# Patient Record
Sex: MALE | Race: WHITE | NOT HISPANIC OR LATINO | Employment: OTHER | URBAN - NONMETROPOLITAN AREA
[De-identification: names, ages, dates, MRNs, and addresses within clinical notes are randomized per-mention and may not be internally consistent; named-entity substitution may affect disease eponyms.]

---

## 2017-01-06 ENCOUNTER — OFFICE VISIT (OUTPATIENT)
Dept: OTOLARYNGOLOGY | Facility: CLINIC | Age: 72
End: 2017-01-06

## 2017-01-06 VITALS
TEMPERATURE: 97.9 F | HEART RATE: 71 BPM | HEIGHT: 72 IN | DIASTOLIC BLOOD PRESSURE: 97 MMHG | WEIGHT: 211.25 LBS | SYSTOLIC BLOOD PRESSURE: 146 MMHG | BODY MASS INDEX: 28.61 KG/M2

## 2017-01-06 DIAGNOSIS — H74.92 MASTOID DISORDER, LEFT: ICD-10-CM

## 2017-01-06 DIAGNOSIS — J30.0 VASOMOTOR RHINITIS: ICD-10-CM

## 2017-01-06 DIAGNOSIS — H91.93 HEARING LOSS, BILATERAL: ICD-10-CM

## 2017-01-06 DIAGNOSIS — H69.83 ETD (EUSTACHIAN TUBE DYSFUNCTION), BILATERAL: Primary | ICD-10-CM

## 2017-01-06 DIAGNOSIS — H70.12 MASTOIDITIS, CHRONIC, LEFT: ICD-10-CM

## 2017-01-06 PROCEDURE — 69220 CLEAN OUT MASTOID CAVITY: CPT | Performed by: NURSE PRACTITIONER

## 2017-01-06 PROCEDURE — 99213 OFFICE O/P EST LOW 20 MIN: CPT | Performed by: NURSE PRACTITIONER

## 2017-01-06 RX ORDER — LOVASTATIN 10 MG/1
10 TABLET ORAL NIGHTLY
COMMUNITY
End: 2019-10-30 | Stop reason: DRUGHIGH

## 2017-01-06 RX ORDER — ASPIRIN 81 MG/1
81 TABLET, CHEWABLE ORAL DAILY
COMMUNITY

## 2017-01-06 RX ORDER — LISINOPRIL 2.5 MG/1
2.5 TABLET ORAL DAILY
COMMUNITY

## 2017-01-06 RX ORDER — IPRATROPIUM BROMIDE 42 UG/1
2 SPRAY, METERED NASAL 3 TIMES DAILY PRN
Qty: 15 ML | Refills: 11 | Status: SHIPPED | OUTPATIENT
Start: 2017-01-06

## 2017-01-06 RX ORDER — CLOPIDOGREL BISULFATE 75 MG/1
75 TABLET ORAL DAILY
COMMUNITY

## 2017-01-06 NOTE — PROGRESS NOTES
YOB: 1945  Location: [facility]  Location Address: [address] [place] [address], [facility], [address] [place] [address]  Location Phone: [phone]    Chief Complaint   Patient presents with   • Ear Problem       History of Present Illness  Prashanth Cantu is a 71 y.o. male.  Prashanth Cantu is here for follow up of ENT complaints. The patient has had problems with cerumen accumulation and decreased hearing  The symptoms are not localized to a particular location. The patient has had moderate symptoms. The symptoms have been present for the last several years . The symptoms are aggravated by  no identifiable factors . The symptoms are improved by surgery, myringotomy tube insertion.  Hx of left mastoidetomy Dr. Acevedo - doing well and may be released from him.  C/o runny nose constantly.     Past Medical History   Diagnosis Date   • Cholesteatoma    • Chronic otitis externa    • Hearing loss    • Heart disease    • Hypertension    • Impacted cerumen    • Mastoiditis    • Tympanic membrane perforation        Past Surgical History   Procedure Laterality Date   • Coronary angioplasty with stent placement     • Shoulder surgery     • Tympanomastoidectomy           Current Outpatient Prescriptions:   •  aspirin 81 MG chewable tablet, Chew 81 mg Daily., Disp: , Rfl:   •  clopidogrel (PLAVIX) 75 MG tablet, Take 75 mg by mouth Daily., Disp: , Rfl:   •  fluticasone (VERAMYST) 27.5 MCG/SPRAY nasal spray, 2 sprays into each nostril Daily., Disp: , Rfl:   •  fluticasone-salmeterol (ADVAIR) 100-50 MCG/DOSE DISKUS, Inhale 2 (Two) Times a Day., Disp: , Rfl:   •  lisinopril (PRINIVIL,ZESTRIL) 2.5 MG tablet, Take 2.5 mg by mouth Daily., Disp: , Rfl:   •  lovastatin (MEVACOR) 10 MG tablet, Take 10 mg by mouth Every Night., Disp: , Rfl:   •  ipratropium (ATROVENT) 0.06 % nasal spray, 2 sprays into each nostril 3 (Three) Times a Day As Needed for rhinitis (FOR RUNNY NOSE)., Disp: 15 mL, Rfl: 11    Review of patient's allergies indicates no  known allergies.    Family History   Problem Relation Age of Onset   • Cancer Mother        Social History     Social History   • Marital status:      Spouse name: N/A   • Number of children: N/A   • Years of education: N/A     Occupational History   • Not on file.     Social History Main Topics   • Smoking status: Current Every Day Smoker     Packs/day: 1.00   • Smokeless tobacco: Not on file   • Alcohol use No   • Drug use: Defer   • Sexual activity: Not on file     Other Topics Concern   • Not on file     Social History Narrative   • No narrative on file       Review of Systems   Constitutional: Negative.    HENT:        SEE HPI   Eyes: Negative.    Respiratory: Negative.    Cardiovascular: Negative.    Gastrointestinal: Negative.    Endocrine: Negative.    Genitourinary: Negative.    Musculoskeletal: Negative.    Skin: Negative.    Allergic/Immunologic: Negative.    Neurological: Negative.    Hematological: Negative.    Psychiatric/Behavioral: Negative.        Vitals:    01/06/17 1015   BP: 146/97   Pulse: 71   Temp: 97.9 °F (36.6 °C)       Objective     Physical Exam  CONSTITUTIONAL: well nourished, alert, oriented, in no acute distress     COMMUNICATION AND VOICE: able to communicate normally, normal voice quality    HEAD: normocephalic, no lesions, atraumatic, no tenderness, no masses     FACE: appearance normal, no lesions, no tenderness, no deformities, facial motion symmetric    SALIVARY GLANDS: parotid glands with no tenderness, no swelling, no masses, submandibular glands with normal size, nontender    EYES: ocular motility normal, eyelids normal, orbits normal, no proptosis, conjunctiva normal , pupils equal, round     EARS:  Hearing: response to conversational voice normal bilaterally   External Ears: auricles without lesions  Otoscopic: right Tm intact and patent PET, left TM post surgical changes, left EAC/mastoid bowl with squam debris debrided with suction    NOSE:  External Nose: structure  normal, no tenderness on palpation, no nasal discharge, no lesions, no evidence of trauma, nostrils patent   Intranasal Exam: nasal mucosa normal, vestibule within normal limits, inferior turbinate normal, nasal septum midline       ORAL:  Lips: upper and lower lips without lesion   Teeth: dentition within normal limits for age   Gums: gingivae healthy   Oral Mucosa: oral mucosa normal, no mucosal lesions   Floor of Mouth: Warthin’s duct patent, mucosa normal  Tongue: lingual mucosa normal without lesions, normal tongue mobility   Palate: soft and hard palates with normal mucosa and structure  Oropharynx: oropharyngeal mucosa normal    HYPOPHARYNX:   LARYNX: deferred    NECK: neck appearance normal, no mass,  noted without erythema or tenderness    THYROID: no overt thyromegaly, no tenderness, nodules or mass present on palpation, position midline     LYMPH NODES: no lymphadenopathy    CHEST/RESPIRATORY: respiratory effort normal, normal breath sounds     CARDIOVASCULAR: rate and rhythm normal, extremities without cyanosis or edema      NEUROLOGIC/PSYCHIATRIC: oriented to time, place and person, mood normal, affect appropriate, CN II-XII intact grossly    Assessment/Plan   Bill was seen today for ear problem.    Diagnoses and all orders for this visit:    ETD (eustachian tube dysfunction), bilateral    Hearing loss, bilateral    Mastoiditis, chronic, left    Mastoid disorder, left    Vasomotor rhinitis    Other orders  -     ipratropium (ATROVENT) 0.06 % nasal spray; 2 sprays into each nostril 3 (Three) Times a Day As Needed for rhinitis (FOR RUNNY NOSE).      * Surgery not found *  No orders of the defined types were placed in this encounter.    Return in about 4 months (around 5/6/2017).       Patient Instructions   Dry ear precautions  Left mastoid debrided.    Call for problems or worsening symptoms

## 2017-01-06 NOTE — MR AVS SNAPSHOT
Prashanth Cantu   1/6/2017 10:00 AM   Office Visit    Dept Phone:  [phone]   Encounter #:  [id number]    Provider:  SANGEETA Madden   Department:  [facility]                Your Full Care Plan              Today's Medication Changes          These changes are accurate as of: 1/6/17 10:47 AM.  If you have any questions, ask your nurse or doctor.               New Medication(s)Ordered:     ipratropium 0.06 % nasal spray   Commonly known as:  ATROVENT   2 sprays into each nostril 3 (Three) Times a Day As Needed for rhinitis (FOR RUNNY NOSE).   Started by:  SANGEETA Madden            Where to Get Your Medications      These medications were sent to [facility] [address] [place] [address] - [address] - [phone] [facility] [phone]   [address] [facility] [address]     Phone:  [phone]     ipratropium 0.06 % nasal spray                  Your Updated Medication List          This list is accurate as of: 1/6/17 10:47 AM.  Always use your most recent med list.                aspirin 81 MG chewable tablet       clopidogrel 75 MG tablet   Commonly known as:  PLAVIX       fluticasone 27.5 MCG/SPRAY nasal spray   Commonly known as:  VERAMYST       fluticasone-salmeterol 100-50 MCG/DOSE DISKUS   Commonly known as:  ADVAIR       ipratropium 0.06 % nasal spray   Commonly known as:  ATROVENT   2 sprays into each nostril 3 (Three) Times a Day As Needed for rhinitis (FOR RUNNY NOSE).       lisinopril 2.5 MG tablet   Commonly known as:  PRINIVIL,ZESTRIL       lovastatin 10 MG tablet   Commonly known as:  MEVACOR               You Were Diagnosed With        Codes Comments    ETD (eustachian tube dysfunction), bilateral    -  Primary ICD-10-CM: H69.83  ICD-9-CM: 381.81       Instructions     None    Patient Instructions History      Upcoming Appointments     Visit Type Date Time Department    FOLLOW UP 1/6/2017 10:00 AM [facility] [place]    FOLLOW UP 5/8/2017  9:45 AM  "[facility] [place]      [facility] Signup     Our records indicate that you have declined [facility] signup. If you would like to sign up for [facility], please email [facility] [email or web address] or call [phone] to obtain an activation code.             Other Info from Your Visit           Your Appointments     May 08, 2017  9:45 AM CDT   Follow Up with SANGEETA Madden   [facility] (--)    [address] [place] [address]   [address]  [facility] [address] [phone]           Arrive 15 minutes prior to appointment.              Allergies     No Known Allergies      Reason for Visit     Ear Problem           Vital Signs     Blood Pressure Pulse Temperature Height Weight Body Mass Index    146/97 71 97.9 °F (36.6 °C) 72\" (182.9 cm) 211 lb 4 oz (95.8 kg) 28.65 kg/m2    Smoking Status                   Current Every Day Smoker           Problems and Diagnoses Noted     Eustachian tube dysfunction    -  Primary        "

## 2017-03-13 ENCOUNTER — OFFICE VISIT (OUTPATIENT)
Dept: OTOLARYNGOLOGY | Facility: CLINIC | Age: 72
End: 2017-03-13

## 2017-03-13 VITALS
SYSTOLIC BLOOD PRESSURE: 137 MMHG | TEMPERATURE: 97.8 F | DIASTOLIC BLOOD PRESSURE: 88 MMHG | HEIGHT: 72 IN | WEIGHT: 214.5 LBS | BODY MASS INDEX: 29.05 KG/M2 | HEART RATE: 68 BPM

## 2017-03-13 DIAGNOSIS — H91.93 HEARING LOSS, BILATERAL: ICD-10-CM

## 2017-03-13 DIAGNOSIS — H69.83 ETD (EUSTACHIAN TUBE DYSFUNCTION), BILATERAL: ICD-10-CM

## 2017-03-13 DIAGNOSIS — H74.92 MASTOID DISORDER, LEFT: Primary | ICD-10-CM

## 2017-03-13 DIAGNOSIS — H70.12 MASTOIDITIS, CHRONIC, LEFT: ICD-10-CM

## 2017-03-13 PROCEDURE — 99214 OFFICE O/P EST MOD 30 MIN: CPT | Performed by: NURSE PRACTITIONER

## 2017-03-13 PROCEDURE — 69220 CLEAN OUT MASTOID CAVITY: CPT | Performed by: NURSE PRACTITIONER

## 2017-03-13 NOTE — PROGRESS NOTES
Procedure Note    Preprocedure Diagnosis:  Cerumen Impaction    Postprocedure Diagnosis:  Cerumen Impaction      PROCEDURE NOTE    PROCEDURE: Cleaning of mastoid bowl    ANESTHESIA:None     REASON FOR THE PROCEDURE:The patient presented with cerumen impaction .   The patient verbally consented to intervention after a full discussion of risks, benefits, and alternatives. No guarantees were made or implied.    DETAILS OF THE PROCEDURE:The patient was seated upright in the exam room chair. The open head otoscope was used to visualize the ear canal and tympanic membrane. Squamoceruminous debris was removed from left mastoid bowl

## 2017-03-13 NOTE — PROGRESS NOTES
YOB: 1945  Location: [facility]  Location Address: [address] [place] [address], [facility], [address] [place] [address]  Location Phone: [phone]    Chief Complaint   Patient presents with   • Cerumen Impaction       History of Present Illness  Prashanth Cantu is a 71 y.o. male.  Prashanth Cantu is here for follow up of ENT complaints. The patient has had problems with cerumen accumulation and decreased hearing  The symptoms are not localized to a particular location. The patient has had moderate symptoms. The symptoms have been present for the last several years . The symptoms are aggravated by  no identifiable factors . The symptoms are improved by surgery, myringotomy tube insertion.  Hx of left mastoidetomy Dr. Acevedo. Reports his left ear runs a lot.  He has gtts at home for left ear. Has an upcoming appt with Dr. Acevedo    Past Medical History   Diagnosis Date   • Cholesteatoma    • Chronic otitis externa    • Hearing loss    • Heart disease    • Hypertension    • Impacted cerumen    • Mastoiditis    • Polyp of right middle ear      granulation polyp of righttympanic membrane   • Tympanic membrane perforation        Past Surgical History   Procedure Laterality Date   • Coronary angioplasty with stent placement     • Shoulder surgery     • Tympanomastoidectomy  2015     left         Current Outpatient Prescriptions:   •  aspirin 81 MG chewable tablet, Chew 81 mg Daily., Disp: , Rfl:   •  clopidogrel (PLAVIX) 75 MG tablet, Take 75 mg by mouth Daily., Disp: , Rfl:   •  fluticasone (VERAMYST) 27.5 MCG/SPRAY nasal spray, 2 sprays into each nostril Daily., Disp: , Rfl:   •  fluticasone-salmeterol (ADVAIR) 100-50 MCG/DOSE DISKUS, Inhale 2 (Two) Times a Day., Disp: , Rfl:   •  ipratropium (ATROVENT) 0.06 % nasal spray, 2 sprays into each nostril 3 (Three) Times a Day As Needed for rhinitis (FOR RUNNY NOSE)., Disp: 15 mL, Rfl: 11  •  lisinopril (PRINIVIL,ZESTRIL) 2.5 MG tablet, Take 2.5 mg by mouth Daily., Disp: , Rfl:    •  lovastatin (MEVACOR) 10 MG tablet, Take 10 mg by mouth Every Night., Disp: , Rfl:     Review of patient's allergies indicates no known allergies.    Family History   Problem Relation Age of Onset   • Cancer Mother        Social History     Social History   • Marital status:      Spouse name: N/A   • Number of children: N/A   • Years of education: N/A     Occupational History   • Not on file.     Social History Main Topics   • Smoking status: Current Every Day Smoker     Packs/day: 1.00   • Smokeless tobacco: Not on file   • Alcohol use No   • Drug use: Defer   • Sexual activity: Not on file     Other Topics Concern   • Not on file     Social History Narrative       Review of Systems   Constitutional: Negative.    HENT:        SEE HPI   Eyes: Negative.    Respiratory: Negative.    Cardiovascular: Negative.    Gastrointestinal: Negative.    Endocrine: Negative.    Genitourinary: Negative.    Musculoskeletal: Negative.    Skin: Negative.    Allergic/Immunologic: Negative.    Neurological: Negative.    Hematological: Negative.    Psychiatric/Behavioral: Negative.        Vitals:    03/13/17 1337   BP: 137/88   Pulse: 68   Temp: 97.8 °F (36.6 °C)       Objective     Physical Exam  CONSTITUTIONAL: well nourished, alert, oriented, in no acute distress     COMMUNICATION AND VOICE: able to communicate normally, normal voice quality    HEAD: normocephalic, no lesions, atraumatic, no tenderness, no masses     FACE: appearance normal, no lesions, no tenderness, no deformities, facial motion symmetric    SALIVARY GLANDS: parotid glands with no tenderness, no swelling, no masses, submandibular glands with normal size, nontender    EYES: ocular motility normal, eyelids normal, orbits normal, no proptosis, conjunctiva normal , pupils equal, round     EARS:  Hearing: response to conversational voice decreased  External Ears: auricles without lesions  Otoscopic: right Tm intact with obstructed possibly displaced tube, left  TM post surgical changes, left EAC/mastoid bowl with squamoceruminous debris removed/debrided with suction. lurdes Vicente.  Type B small volume tympanogram    NOSE:  External Nose: structure normal, no tenderness on palpation, no nasal discharge, no lesions, no evidence of trauma, nostrils patent     ORAL:  Lips: upper and lower lips without lesion       NECK: neck appearance normal, no mass,  noted without erythema or tenderness    THYROID: no overt thyromegaly, no tenderness, nodules or mass present on palpation, position midline     LYMPH NODES: no lymphadenopathy    CHEST/RESPIRATORY: respiratory effort normal, normal breath sounds     CARDIOVASCULAR: rate and rhythm normal, extremities without cyanosis or edema      NEUROLOGIC/PSYCHIATRIC: oriented to time, place and person, mood normal, affect appropriate, CN II-XII intact grossly    Assessment/Plan   Bill was seen today for cerumen impaction.    Diagnoses and all orders for this visit:    Mastoid disorder, left    Hearing loss, bilateral    ETD (eustachian tube dysfunction), bilateral    Mastoiditis, chronic, left    * Surgery not found *  No orders of the defined types were placed in this encounter.    Return in about 3 months (around 6/13/2017).       Patient Instructions   Return to Dr Acevedo to possibly replace right tube.      Dry ear precautions

## 2017-05-17 ENCOUNTER — TRANSCRIBE ORDERS (OUTPATIENT)
Dept: ADMINISTRATIVE | Facility: HOSPITAL | Age: 72
End: 2017-05-17

## 2017-05-17 DIAGNOSIS — H90.3 SENSORY HEARING LOSS, BILATERAL: Primary | ICD-10-CM

## 2017-05-25 ENCOUNTER — HOSPITAL ENCOUNTER (OUTPATIENT)
Dept: MRI IMAGING | Facility: HOSPITAL | Age: 72
Discharge: HOME | End: 2017-05-25
Admitting: OTOLARYNGOLOGY

## 2017-05-25 ENCOUNTER — HOSPITAL ENCOUNTER (OUTPATIENT)
Dept: MRI IMAGING | Facility: HOSPITAL | Age: 72
Discharge: HOME | End: 2017-05-25

## 2017-05-25 DIAGNOSIS — H90.3 SENSORY HEARING LOSS, BILATERAL: ICD-10-CM

## 2017-05-25 LAB — CREAT BLDA-MCNC: 1.1 MG/DL (ref 0.6–1.3)

## 2017-05-25 PROCEDURE — 0 GADOBENATE DIMEGLUMINE 529 MG/ML SOLUTION: Performed by: OTOLARYNGOLOGY

## 2017-05-25 PROCEDURE — 70553 MRI BRAIN STEM W/O & W/DYE: CPT

## 2017-05-25 PROCEDURE — A9577 INJ MULTIHANCE: HCPCS | Performed by: OTOLARYNGOLOGY

## 2017-05-25 PROCEDURE — 82565 ASSAY OF CREATININE: CPT

## 2017-05-25 RX ADMIN — GADOBENATE DIMEGLUMINE 20 ML: 529 INJECTION, SOLUTION INTRAVENOUS at 16:30

## 2017-05-26 RX ORDER — AZELASTINE 1 MG/ML
2 SPRAY, METERED NASAL 2 TIMES DAILY
Qty: 30 ML | Refills: 6 | Status: SHIPPED | OUTPATIENT
Start: 2017-05-26 | End: 2018-06-27 | Stop reason: SDUPTHER

## 2017-05-31 ENCOUNTER — TELEPHONE (OUTPATIENT)
Dept: OTOLARYNGOLOGY | Facility: CLINIC | Age: 72
End: 2017-05-31

## 2017-05-31 RX ORDER — FLUTICASONE PROPIONATE 50 MCG
2 SPRAY, SUSPENSION (ML) NASAL DAILY
Qty: 16 G | Refills: 6 | Status: SHIPPED | OUTPATIENT
Start: 2017-05-31 | End: 2018-07-16 | Stop reason: SDUPTHER

## 2017-07-11 ENCOUNTER — OFFICE VISIT (OUTPATIENT)
Dept: OTOLARYNGOLOGY | Facility: CLINIC | Age: 72
End: 2017-07-11

## 2017-07-11 VITALS
DIASTOLIC BLOOD PRESSURE: 78 MMHG | HEART RATE: 80 BPM | TEMPERATURE: 98 F | SYSTOLIC BLOOD PRESSURE: 135 MMHG | RESPIRATION RATE: 20 BRPM | BODY MASS INDEX: 27.09 KG/M2 | WEIGHT: 200 LBS | HEIGHT: 72 IN

## 2017-07-11 DIAGNOSIS — H69.83 ETD (EUSTACHIAN TUBE DYSFUNCTION), BILATERAL: ICD-10-CM

## 2017-07-11 DIAGNOSIS — H92.11 OTORRHEA OF RIGHT EAR: ICD-10-CM

## 2017-07-11 DIAGNOSIS — H70.12 MASTOIDITIS, CHRONIC, LEFT: Primary | ICD-10-CM

## 2017-07-11 DIAGNOSIS — H91.93 HEARING LOSS, BILATERAL: ICD-10-CM

## 2017-07-11 DIAGNOSIS — Z96.22 STATUS POST MYRINGOTOMY WITH INSERTION OF TUBE: ICD-10-CM

## 2017-07-11 PROCEDURE — 99214 OFFICE O/P EST MOD 30 MIN: CPT | Performed by: NURSE PRACTITIONER

## 2017-07-11 PROCEDURE — 69220 CLEAN OUT MASTOID CAVITY: CPT | Performed by: NURSE PRACTITIONER

## 2017-07-11 RX ORDER — CIPROFLOXACIN AND DEXAMETHASONE 3; 1 MG/ML; MG/ML
4 SUSPENSION/ DROPS AURICULAR (OTIC) 2 TIMES DAILY
Qty: 7.5 ML | Refills: 0 | Status: SHIPPED | OUTPATIENT
Start: 2017-07-11 | End: 2017-07-18

## 2017-07-17 NOTE — PROGRESS NOTES
PRIMARY CARE PROVIDER: Tony Spence MD  REFERRING PROVIDER: No ref. provider found    Chief Complaint   Patient presents with   • Ear Problem     BLEEDING FROM RIGHT EAR       Subjective   History of Present Illness:  Prashanth Cantu is a  72 y.o. male who complains of bloody otorrhea and muffled hearing. The symptoms are localized to the right ear. The patient has had mild symptoms. The symptoms have been present for the last 2 weeks . There have been no identified factors that aggravate the symptoms . There have been no factors that have improved the symptoms. He states he had a right PE tube placed by Dr. Acevedo approximately 3-5 months ago.  He also has a history of a left mastoidectomy by Dr. Acevedo.    Review of Systems:  Review of Systems   Constitutional: Negative for chills and fever.   HENT: Positive for ear discharge and hearing loss. Negative for congestion, ear pain, postnasal drip, sinus pressure, sore throat and voice change.    All other systems reviewed and are negative.      Past History:  Past Medical History:   Diagnosis Date   • Cholesteatoma    • Chronic mastoiditis    • Chronic otitis externa    • Eustachian tube dysfunction    • Hearing loss    • Heart disease    • Hypertension    • Impacted cerumen    • Mastoid disorder    • Mastoiditis    • Polyp of right middle ear     granulation polyp of righttympanic membrane   • Tympanic membrane perforation      Past Surgical History:   Procedure Laterality Date   • CORONARY ANGIOPLASTY WITH STENT PLACEMENT     • SHOULDER SURGERY     • TYMPANOMASTOIDECTOMY  02/2015    left     Family History   Problem Relation Age of Onset   • Cancer Mother      Social History   Substance Use Topics   • Smoking status: Current Every Day Smoker     Packs/day: 1.00   • Smokeless tobacco: None   • Alcohol use No     Allergies:  Review of patient's allergies indicates no known allergies.    Current Outpatient Prescriptions:   •  aspirin 81 MG chewable tablet, Chew 81  "mg Daily., Disp: , Rfl:   •  clopidogrel (PLAVIX) 75 MG tablet, Take 75 mg by mouth Daily., Disp: , Rfl:   •  fluticasone-salmeterol (ADVAIR) 100-50 MCG/DOSE DISKUS, Inhale 2 (Two) Times a Day., Disp: , Rfl:   •  ipratropium (ATROVENT) 0.06 % nasal spray, 2 sprays into each nostril 3 (Three) Times a Day As Needed for rhinitis (FOR RUNNY NOSE)., Disp: 15 mL, Rfl: 11  •  lisinopril (PRINIVIL,ZESTRIL) 2.5 MG tablet, Take 2.5 mg by mouth Daily., Disp: , Rfl:   •  lovastatin (MEVACOR) 10 MG tablet, Take 10 mg by mouth Every Night., Disp: , Rfl:   •  ciprofloxacin-dexamethasone (CIPRODEX) 0.3-0.1 % otic suspension, Administer 4 drops to the right ear 2 (Two) Times a Day for 7 days., Disp: 7.5 mL, Rfl: 0      Objective     Vital Signs:    /78  Pulse 80  Temp 98 °F (36.7 °C)  Resp 20  Ht 72\" (182.9 cm)  Wt 200 lb (90.7 kg)  BMI 27.12 kg/m2    Physical Exam:  Physical Exam  CONSTITUTIONAL: well nourished, well-developed, alert, oriented, in no acute distress   COMMUNICATION AND VOICE: able to communicate normally, normal voice quality  HEAD: normocephalic, no lesions, atraumatic, no tenderness, no masses   FACE: appearance normal, no lesions, no tenderness, no deformities, facial motion symmetric  SALIVARY GLANDS: parotid glands with no tenderness, no swelling, no masses, submandibular glands with normal size, nontender  EYES: ocular motility normal, eyelids normal, orbits normal, no proptosis, conjunctiva normal , pupils equal, round   EARS:  Hearing: response to conversational voice normal bilaterally   External Ears: auricles without lesions  Otoscopic: Right tympanic membrane intact with obstructed PE tube-this is likely displaced, left tympanic membrane with post-surgical changes, left EAC/mastoid bowl with squamoceruminous debris partially removed/debridement with suction.  Patient was unable to tolerate complete debridement due to severe dizziness.  NOSE:  External Nose: structure normal, no tenderness on " palpation, no nasal discharge, no lesions, no evidence of trauma, nostrils patent   ORAL:  Lips: upper and lower lips without lesion    NECK: neck appearance normal, no masses or tenderness  THYROID: no overt thyromegaly, no tenderness, nodules or mass present on palpation, position midline   LYMPH NODES: no lymphadenopathy  CHEST/RESPIRATORY: respiratory effort normal, normal breath sounds   CARDIOVASCULAR: rate and rhythm normal, extremities without cyanosis or edema    NEUROLOGIC/PSYCHIATRIC: oriented to time, place and person, mood normal, affect appropriate, CN II-XII intact grossly      Assessment   Assessment:  1. Mastoiditis, chronic, left    2. Hearing loss, bilateral    3. ETD (eustachian tube dysfunction), bilateral    4. Status post myringotomy with insertion of tube    5. Otorrhea of right ear        Plan   Plan:    New Medications Ordered This Visit   Medications   • ciprofloxacin-dexamethasone (CIPRODEX) 0.3-0.1 % otic suspension     Sig: Administer 4 drops to the right ear 2 (Two) Times a Day for 7 days.     Dispense:  7.5 mL     Refill:  0     Starts to the Ciprodex drops to right ear.  Patient was instructed to get an appointment with Dr. Acevedo as he will likely need this PE tube replaced.  Left EAC/mastoid bowl was partially debrided.  See procedure note. Dry ear precautions. Call for ear drainage, ear pain, fever over 101, or hearing loss. Call for problems or worsening symptoms.  Follow-up in 4 weeks.     My findings and recommendations were discussed and questions were answered.     Val Diehl, APRN

## 2017-07-17 NOTE — PROGRESS NOTES
Procedure   Procedures   Procedure Note    Preprocedure Diagnosis:  Cerumen Impaction    Postprocedure Diagnosis:  Cerumen Impaction      PROCEDURE NOTE    PROCEDURE: Cleaning of left mastoid bowl    ANESTHESIA: None     REASON FOR THE PROCEDURE:The patient presented with cerumen impaction .   The patient verbally consented to intervention after a full discussion of risks, benefits, and alternatives. No guarantees were made or implied.    DETAILS OF THE PROCEDURE:The patient was seated upright in the exam room chair. The open head otoscope was used to visualize the ear canal and tympanic membrane. Squamoceruminous debris was partially removed from left mastoid bowl using suction.  Patient was unable to tolerate complete removal due to dizziness.

## 2017-10-31 RX ORDER — IBUPROFEN 800 MG/1
800 TABLET ORAL 2 TIMES DAILY PRN
Qty: 120 TABLET | Refills: 1 | Status: SHIPPED | OUTPATIENT
Start: 2017-10-31

## 2017-11-15 DIAGNOSIS — Z12.5 SCREENING FOR PROSTATE CANCER: ICD-10-CM

## 2017-11-15 DIAGNOSIS — E78.00 PURE HYPERCHOLESTEROLEMIA: ICD-10-CM

## 2017-11-15 DIAGNOSIS — I10 ESSENTIAL HYPERTENSION: Primary | ICD-10-CM

## 2017-12-13 DIAGNOSIS — Z12.5 SCREENING FOR PROSTATE CANCER: ICD-10-CM

## 2017-12-13 DIAGNOSIS — I10 ESSENTIAL HYPERTENSION: ICD-10-CM

## 2017-12-13 DIAGNOSIS — E78.00 PURE HYPERCHOLESTEROLEMIA: ICD-10-CM

## 2017-12-13 LAB
ALBUMIN SERPL-MCNC: 4.5 G/DL (ref 3.5–5.2)
ALP BLD-CCNC: 70 U/L (ref 40–130)
ALT SERPL-CCNC: 20 U/L (ref 5–41)
ANION GAP SERPL CALCULATED.3IONS-SCNC: 13 MMOL/L (ref 7–19)
AST SERPL-CCNC: 19 U/L (ref 5–40)
BACTERIA: ABNORMAL /HPF
BILIRUB SERPL-MCNC: 1 MG/DL (ref 0.2–1.2)
BILIRUBIN URINE: ABNORMAL
BLOOD, URINE: NEGATIVE
BUN BLDV-MCNC: 19 MG/DL (ref 8–23)
CALCIUM SERPL-MCNC: 9.6 MG/DL (ref 8.8–10.2)
CHLORIDE BLD-SCNC: 103 MMOL/L (ref 98–111)
CHOLESTEROL, TOTAL: 126 MG/DL (ref 160–199)
CLARITY: ABNORMAL
CO2: 26 MMOL/L (ref 22–29)
COLOR: ABNORMAL
CREAT SERPL-MCNC: 0.9 MG/DL (ref 0.5–1.2)
CRYSTALS, UA: ABNORMAL /HPF
GFR NON-AFRICAN AMERICAN: >60
GLUCOSE BLD-MCNC: 89 MG/DL (ref 74–109)
GLUCOSE URINE: NEGATIVE MG/DL
HCT VFR BLD CALC: 46.9 % (ref 42–52)
HDLC SERPL-MCNC: 38 MG/DL (ref 55–121)
HEMOGLOBIN: 15.5 G/DL (ref 14–18)
KETONES, URINE: NEGATIVE MG/DL
LDL CHOLESTEROL CALCULATED: 58 MG/DL
LEUKOCYTE ESTERASE, URINE: ABNORMAL
MCH RBC QN AUTO: 30.8 PG (ref 27–31)
MCHC RBC AUTO-ENTMCNC: 33 G/DL (ref 33–37)
MCV RBC AUTO: 93.1 FL (ref 80–94)
MUCUS: ABNORMAL /LPF
NITRITE, URINE: NEGATIVE
PDW BLD-RTO: 12.4 % (ref 11.5–14.5)
PH UA: 6
PLATELET # BLD: 207 K/UL (ref 130–400)
PMV BLD AUTO: 10.2 FL (ref 9.4–12.4)
POTASSIUM SERPL-SCNC: 4.4 MMOL/L (ref 3.5–5)
PROSTATE SPECIFIC ANTIGEN: 1.16 NG/ML (ref 0–4)
PROTEIN UA: NEGATIVE MG/DL
RBC # BLD: 5.04 M/UL (ref 4.7–6.1)
SODIUM BLD-SCNC: 142 MMOL/L (ref 136–145)
SPECIFIC GRAVITY UA: 1.02
TOTAL PROTEIN: 7.1 G/DL (ref 6.6–8.7)
TRIGL SERPL-MCNC: 148 MG/DL (ref 0–149)
TSH SERPL DL<=0.05 MIU/L-ACNC: 2.45 UIU/ML (ref 0.27–4.2)
UROBILINOGEN, URINE: 0.2 E.U./DL
WBC # BLD: 6 K/UL (ref 4.8–10.8)
WBC UA: ABNORMAL /HPF (ref 0–5)

## 2017-12-13 RX ORDER — ASPIRIN 81 MG/1
81 TABLET ORAL DAILY
COMMUNITY

## 2017-12-13 RX ORDER — NITROGLYCERIN 0.4 MG/1
0.4 TABLET SUBLINGUAL EVERY 5 MIN PRN
COMMUNITY
End: 2018-06-06 | Stop reason: SDUPTHER

## 2017-12-13 RX ORDER — OXYCODONE AND ACETAMINOPHEN 7.5; 325 MG/1; MG/1
1 TABLET ORAL 2 TIMES DAILY PRN
COMMUNITY
Start: 2017-09-22

## 2017-12-13 RX ORDER — SILDENAFIL 100 MG/1
100 TABLET, FILM COATED ORAL PRN
COMMUNITY
End: 2017-12-20

## 2017-12-13 RX ORDER — LISINOPRIL 2.5 MG/1
2.5 TABLET ORAL DAILY
COMMUNITY
End: 2018-01-08 | Stop reason: SDUPTHER

## 2017-12-13 RX ORDER — CLOPIDOGREL BISULFATE 75 MG/1
75 TABLET ORAL DAILY
COMMUNITY
End: 2018-01-08 | Stop reason: SDUPTHER

## 2017-12-13 RX ORDER — FLUTICASONE PROPIONATE 50 MCG
SPRAY, SUSPENSION (ML) NASAL
COMMUNITY
Start: 2017-10-04 | End: 2021-09-07 | Stop reason: SDUPTHER

## 2017-12-13 RX ORDER — LOVASTATIN 20 MG/1
20 TABLET ORAL NIGHTLY
COMMUNITY
End: 2018-01-08 | Stop reason: SDUPTHER

## 2017-12-15 LAB — URINE CULTURE, ROUTINE: NORMAL

## 2017-12-20 ENCOUNTER — OFFICE VISIT (OUTPATIENT)
Dept: INTERNAL MEDICINE | Age: 72
End: 2017-12-20
Payer: MEDICARE

## 2017-12-20 VITALS
HEART RATE: 82 BPM | BODY MASS INDEX: 28.71 KG/M2 | SYSTOLIC BLOOD PRESSURE: 128 MMHG | DIASTOLIC BLOOD PRESSURE: 82 MMHG | OXYGEN SATURATION: 98 % | WEIGHT: 212 LBS | HEIGHT: 72 IN

## 2017-12-20 DIAGNOSIS — I49.5 SICK SINUS SYNDROME (HCC): ICD-10-CM

## 2017-12-20 DIAGNOSIS — E78.00 PURE HYPERCHOLESTEROLEMIA: ICD-10-CM

## 2017-12-20 DIAGNOSIS — F17.200 CURRENT EVERY DAY SMOKER: Primary | ICD-10-CM

## 2017-12-20 DIAGNOSIS — I25.10 CORONARY ARTERY DISEASE INVOLVING NATIVE CORONARY ARTERY OF NATIVE HEART WITHOUT ANGINA PECTORIS: ICD-10-CM

## 2017-12-20 DIAGNOSIS — I10 ESSENTIAL HYPERTENSION: ICD-10-CM

## 2017-12-20 DIAGNOSIS — J44.9 CHRONIC OBSTRUCTIVE PULMONARY DISEASE, UNSPECIFIED COPD TYPE (HCC): ICD-10-CM

## 2017-12-20 PROBLEM — H90.6 MIXED CONDUCTIVE AND SENSORINEURAL HEARING LOSS OF BOTH EARS: Status: ACTIVE | Noted: 2017-12-20

## 2017-12-20 PROCEDURE — G8598 ASA/ANTIPLAT THER USED: HCPCS | Performed by: INTERNAL MEDICINE

## 2017-12-20 PROCEDURE — 4004F PT TOBACCO SCREEN RCVD TLK: CPT | Performed by: INTERNAL MEDICINE

## 2017-12-20 PROCEDURE — 99214 OFFICE O/P EST MOD 30 MIN: CPT | Performed by: INTERNAL MEDICINE

## 2017-12-20 PROCEDURE — 4040F PNEUMOC VAC/ADMIN/RCVD: CPT | Performed by: INTERNAL MEDICINE

## 2017-12-20 PROCEDURE — G8484 FLU IMMUNIZE NO ADMIN: HCPCS | Performed by: INTERNAL MEDICINE

## 2017-12-20 PROCEDURE — G8427 DOCREV CUR MEDS BY ELIG CLIN: HCPCS | Performed by: INTERNAL MEDICINE

## 2017-12-20 PROCEDURE — 3023F SPIROM DOC REV: CPT | Performed by: INTERNAL MEDICINE

## 2017-12-20 PROCEDURE — 1123F ACP DISCUSS/DSCN MKR DOCD: CPT | Performed by: INTERNAL MEDICINE

## 2017-12-20 PROCEDURE — G8926 SPIRO NO PERF OR DOC: HCPCS | Performed by: INTERNAL MEDICINE

## 2017-12-20 PROCEDURE — G8419 CALC BMI OUT NRM PARAM NOF/U: HCPCS | Performed by: INTERNAL MEDICINE

## 2017-12-20 PROCEDURE — 3017F COLORECTAL CA SCREEN DOC REV: CPT | Performed by: INTERNAL MEDICINE

## 2017-12-20 RX ORDER — SILDENAFIL 100 MG/1
100 TABLET, FILM COATED ORAL PRN
Qty: 10 TABLET | Refills: 1 | Status: SHIPPED | OUTPATIENT
Start: 2017-12-20 | End: 2021-01-05

## 2017-12-20 RX ORDER — SILDENAFIL 100 MG/1
100 TABLET, FILM COATED ORAL PRN
COMMUNITY
End: 2017-12-20 | Stop reason: SDUPTHER

## 2017-12-20 ASSESSMENT — PATIENT HEALTH QUESTIONNAIRE - PHQ9
2. FEELING DOWN, DEPRESSED OR HOPELESS: 0
SUM OF ALL RESPONSES TO PHQ QUESTIONS 1-9: 0
1. LITTLE INTEREST OR PLEASURE IN DOING THINGS: 0
SUM OF ALL RESPONSES TO PHQ9 QUESTIONS 1 & 2: 0

## 2017-12-20 ASSESSMENT — ENCOUNTER SYMPTOMS
SORE THROAT: 0
ABDOMINAL PAIN: 0
NAUSEA: 0
RHINORRHEA: 0
COUGH: 1
TROUBLE SWALLOWING: 0
SHORTNESS OF BREATH: 0

## 2017-12-20 NOTE — PROGRESS NOTES
History Narrative    No narrative on file      No Known Allergies   Current Outpatient Prescriptions   Medication Sig Dispense Refill    sildenafil (VIAGRA) 100 MG tablet Take 100 mg by mouth as needed for Erectile Dysfunction      Cyanocobalamin (VITAMIN B 12 PO) Take 1 tablet by mouth daily      aspirin 81 MG EC tablet Take 81 mg by mouth daily      lisinopril (PRINIVIL;ZESTRIL) 2.5 MG tablet Take 2.5 mg by mouth daily      lovastatin (MEVACOR) 20 MG tablet Take 20 mg by mouth nightly      nitroGLYCERIN (NITROSTAT) 0.4 MG SL tablet Place 0.4 mg under the tongue every 5 minutes as needed for Chest pain up to max of 3 total doses. If no relief after 1 dose, call 911.  clopidogrel (PLAVIX) 75 MG tablet Take 75 mg by mouth daily      oxyCODONE-acetaminophen (PERCOCET) 7.5-325 MG per tablet       ADVAIR DISKUS 250-50 MCG/DOSE AEPB Inhale 1 puff into the lungs 2 times daily       fluticasone (FLONASE) 50 MCG/ACT nasal spray       ibuprofen (ADVIL;MOTRIN) 800 MG tablet Take 1 tablet by mouth 2 times daily as needed for Pain 120 tablet 1     No current facility-administered medications for this visit. Review of Systems   Constitutional: Negative for fatigue and fever. HENT: Negative for rhinorrhea, sore throat and trouble swallowing. Respiratory: Positive for cough. Negative for shortness of breath. Cardiovascular: Negative for chest pain and palpitations. Gastrointestinal: Negative for abdominal pain and nausea. Endocrine: Negative for cold intolerance and heat intolerance. Genitourinary: Negative for dysuria and frequency. Skin: Negative for rash and wound. Neurological: Negative for seizures and syncope. Some cognitive decline, degree uncertain   Psychiatric/Behavioral: Negative for behavioral problems and hallucinations.        /82   Pulse 82   Ht 6' (1.829 m)   Wt 212 lb (96.2 kg)   SpO2 98%   BMI 28.75 kg/m²   Physical Exam   Constitutional: He appears well-developed and well-nourished. HENT:   Head: Normocephalic and atraumatic. Eyes: Pupils are equal, round, and reactive to light. No scleral icterus. Neck: No JVD present. Cardiovascular: Regular rhythm. No murmur heard. Pulmonary/Chest: No respiratory distress. He exhibits no tenderness. Abdominal: He exhibits no mass. There is no tenderness. Musculoskeletal: He exhibits no edema or deformity. Lymphadenopathy:     He has no cervical adenopathy. Neurological: He is alert. No cranial nerve deficit. Skin: Skin is warm. No erythema. addendum: Urine exam reveals widened external canals, postsurgical, bilaterally, hearing aid on the right.   Rectal: Prostate is 2+ enlarged, slightly nodular, soft, Hemoccult negative    Orders Only on 12/13/2017   Component Date Value Ref Range Status    Sodium 12/13/2017 142  136 - 145 mmol/L Final    Potassium 12/13/2017 4.4  3.5 - 5.0 mmol/L Final    Chloride 12/13/2017 103  98 - 111 mmol/L Final    CO2 12/13/2017 26  22 - 29 mmol/L Final    Anion Gap 12/13/2017 13  7 - 19 mmol/L Final    Glucose 12/13/2017 89  74 - 109 mg/dL Final    BUN 12/13/2017 19  8 - 23 mg/dL Final    CREATININE 12/13/2017 0.9  0.5 - 1.2 mg/dL Final    GFR Non- 12/13/2017 >60  >60 Final    Calcium 12/13/2017 9.6  8.8 - 10.2 mg/dL Final    Total Protein 12/13/2017 7.1  6.6 - 8.7 g/dL Final    Alb 12/13/2017 4.5  3.5 - 5.2 g/dL Final    Total Bilirubin 12/13/2017 1.0  0.2 - 1.2 mg/dL Final    Alkaline Phosphatase 12/13/2017 70  40 - 130 U/L Final    ALT 12/13/2017 20  5 - 41 U/L Final    AST 12/13/2017 19  5 - 40 U/L Final    WBC 12/13/2017 6.0  4.8 - 10.8 K/uL Final    RBC 12/13/2017 5.04  4.70 - 6.10 M/uL Final    Hemoglobin 12/13/2017 15.5  14.0 - 18.0 g/dL Final    Hematocrit 12/13/2017 46.9  42.0 - 52.0 % Final    MCV 12/13/2017 93.1  80.0 - 94.0 fL Final    MCH 12/13/2017 30.8  27.0 - 31.0 pg Final    MCHC 12/13/2017 33.0  33.0 - 37.0 g/dL at least 1 pack per day  2. CAD/sick sinus syndrome: He had a coronary stent placed many years ago, no current symptoms are noted, no palpitations currently. He does not see cardiology currently. Everything does seem to be stable. EKG reveals a sinus rhythm, no acute change, essentially normal.  3. Blood pressures well controlled, no change in therapy  4. Hypercholesterolemia: Cholesterol ratio is excellent, and continue low-dose statin  5. Low back pain: Essentially control, resolved after injections by Dr. Inis Nageotte, he may continue on Percocet per day. 6. Possible memory impairment: We will review the MRIs done for ENT, if necessary ordering expanded MRI. We have also offered him formal neurocognitive testing. We will plan to see him in 6 weeks, review status of MRI, indications for further testing. He will return in 6 months to see Brad Guzmán, with a CBC, CMP, LDL cholesterol  No orders of the defined types were placed in this encounter. Addendum: MRI from 5/26/17, 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   We can offer cognitive testing at [facility] if family wishes

## 2017-12-22 ENCOUNTER — TELEPHONE (OUTPATIENT)
Dept: FAMILY MEDICINE CLINIC | Age: 72
End: 2017-12-22

## 2018-01-02 ENCOUNTER — HOSPITAL ENCOUNTER (OUTPATIENT)
Dept: CT IMAGING | Age: 73
Discharge: HOME | End: 2018-01-02
Payer: MEDICARE

## 2018-01-02 DIAGNOSIS — I10 ESSENTIAL HYPERTENSION: ICD-10-CM

## 2018-01-02 DIAGNOSIS — I25.10 CORONARY ARTERY DISEASE INVOLVING NATIVE CORONARY ARTERY OF NATIVE HEART WITHOUT ANGINA PECTORIS: ICD-10-CM

## 2018-01-02 DIAGNOSIS — F17.200 SMOKER: Primary | ICD-10-CM

## 2018-01-02 DIAGNOSIS — F17.200 CURRENT EVERY DAY SMOKER: ICD-10-CM

## 2018-01-02 DIAGNOSIS — E78.00 PURE HYPERCHOLESTEROLEMIA: ICD-10-CM

## 2018-01-02 DIAGNOSIS — J44.9 CHRONIC OBSTRUCTIVE PULMONARY DISEASE, UNSPECIFIED COPD TYPE (HCC): ICD-10-CM

## 2018-01-02 DIAGNOSIS — I49.5 SICK SINUS SYNDROME (HCC): ICD-10-CM

## 2018-01-02 PROCEDURE — G0297 LDCT FOR LUNG CA SCREEN: HCPCS

## 2018-01-02 PROCEDURE — 71250 CT THORAX DX C-: CPT

## 2018-01-08 RX ORDER — LOVASTATIN 20 MG/1
20 TABLET ORAL NIGHTLY
Qty: 90 TABLET | Refills: 3 | Status: SHIPPED | OUTPATIENT
Start: 2018-01-08 | End: 2019-01-14 | Stop reason: SDUPTHER

## 2018-01-08 RX ORDER — LISINOPRIL 2.5 MG/1
2.5 TABLET ORAL DAILY
Qty: 90 TABLET | Refills: 3 | Status: SHIPPED | OUTPATIENT
Start: 2018-01-08 | End: 2019-01-14 | Stop reason: SDUPTHER

## 2018-01-08 RX ORDER — CLOPIDOGREL BISULFATE 75 MG/1
75 TABLET ORAL DAILY
Qty: 90 TABLET | Refills: 3 | Status: SHIPPED | OUTPATIENT
Start: 2018-01-08 | End: 2019-01-14 | Stop reason: SDUPTHER

## 2018-01-08 NOTE — TELEPHONE ENCOUNTER
Requested Prescriptions     Pending Prescriptions Disp Refills    lisinopril (PRINIVIL;ZESTRIL) 2.5 MG tablet 90 tablet 3     Sig: Take 1 tablet by mouth daily    clopidogrel (PLAVIX) 75 MG tablet 90 tablet 3     Sig: Take 1 tablet by mouth daily    lovastatin (MEVACOR) 20 MG tablet 90 tablet 3     Sig: Take 1 tablet by mouth nightly

## 2018-01-15 ENCOUNTER — TELEPHONE (OUTPATIENT)
Dept: OTOLARYNGOLOGY | Facility: CLINIC | Age: 73
End: 2018-01-15

## 2018-01-15 NOTE — TELEPHONE ENCOUNTER
Wife called and stated patient is having trouble hearing out of the ear with the tube. She does still have ear drops and will start them today. Also, made the pt a f/u appt to check the tube.

## 2018-02-05 ENCOUNTER — OFFICE VISIT (OUTPATIENT)
Dept: INTERNAL MEDICINE | Age: 73
End: 2018-02-05
Payer: MEDICARE

## 2018-02-05 VITALS
DIASTOLIC BLOOD PRESSURE: 84 MMHG | HEIGHT: 72 IN | WEIGHT: 214 LBS | BODY MASS INDEX: 28.99 KG/M2 | OXYGEN SATURATION: 97 % | SYSTOLIC BLOOD PRESSURE: 142 MMHG | HEART RATE: 81 BPM

## 2018-02-05 DIAGNOSIS — E78.00 PURE HYPERCHOLESTEROLEMIA: Primary | ICD-10-CM

## 2018-02-05 DIAGNOSIS — J44.9 CHRONIC OBSTRUCTIVE PULMONARY DISEASE, UNSPECIFIED COPD TYPE (HCC): ICD-10-CM

## 2018-02-05 DIAGNOSIS — I25.10 CORONARY ARTERY DISEASE INVOLVING NATIVE CORONARY ARTERY OF NATIVE HEART WITHOUT ANGINA PECTORIS: ICD-10-CM

## 2018-02-05 DIAGNOSIS — R41.9 COGNITIVE COMPLAINTS: ICD-10-CM

## 2018-02-05 DIAGNOSIS — I10 ESSENTIAL HYPERTENSION: ICD-10-CM

## 2018-02-05 PROCEDURE — 3023F SPIROM DOC REV: CPT | Performed by: INTERNAL MEDICINE

## 2018-02-05 PROCEDURE — G8926 SPIRO NO PERF OR DOC: HCPCS | Performed by: INTERNAL MEDICINE

## 2018-02-05 PROCEDURE — 99213 OFFICE O/P EST LOW 20 MIN: CPT | Performed by: INTERNAL MEDICINE

## 2018-02-05 PROCEDURE — 4040F PNEUMOC VAC/ADMIN/RCVD: CPT | Performed by: INTERNAL MEDICINE

## 2018-02-05 PROCEDURE — 3017F COLORECTAL CA SCREEN DOC REV: CPT | Performed by: INTERNAL MEDICINE

## 2018-02-05 PROCEDURE — 4004F PT TOBACCO SCREEN RCVD TLK: CPT | Performed by: INTERNAL MEDICINE

## 2018-02-05 PROCEDURE — 1123F ACP DISCUSS/DSCN MKR DOCD: CPT | Performed by: INTERNAL MEDICINE

## 2018-02-05 PROCEDURE — G8427 DOCREV CUR MEDS BY ELIG CLIN: HCPCS | Performed by: INTERNAL MEDICINE

## 2018-02-05 PROCEDURE — G8598 ASA/ANTIPLAT THER USED: HCPCS | Performed by: INTERNAL MEDICINE

## 2018-02-05 PROCEDURE — G8484 FLU IMMUNIZE NO ADMIN: HCPCS | Performed by: INTERNAL MEDICINE

## 2018-02-05 PROCEDURE — G8419 CALC BMI OUT NRM PARAM NOF/U: HCPCS | Performed by: INTERNAL MEDICINE

## 2018-02-05 ASSESSMENT — ENCOUNTER SYMPTOMS
RHINORRHEA: 0
TROUBLE SWALLOWING: 0
ABDOMINAL PAIN: 0
NAUSEA: 0
COUGH: 0
SORE THROAT: 0
SHORTNESS OF BREATH: 0

## 2018-02-08 ENCOUNTER — TELEPHONE (OUTPATIENT)
Dept: INTERNAL MEDICINE | Age: 73
End: 2018-02-08

## 2018-02-08 NOTE — TELEPHONE ENCOUNTER
Alissa Ocampo Lamoure Nurse Navigator, called. The patient came in on 1/2/18 for ct screen of chest w/ smoking hx. On the impression, on #2 there is a 36mm low density nodule occupying right adrenal gland. A follow up is suggested to ensure this is a benign finding.   2/8 3:13

## 2018-02-09 ENCOUNTER — TELEPHONE (OUTPATIENT)
Dept: NEUROLOGY | Age: 73
End: 2018-02-09

## 2018-04-20 ENCOUNTER — OFFICE VISIT (OUTPATIENT)
Dept: OTOLARYNGOLOGY | Facility: CLINIC | Age: 73
End: 2018-04-20

## 2018-04-20 VITALS
TEMPERATURE: 97.8 F | SYSTOLIC BLOOD PRESSURE: 151 MMHG | WEIGHT: 214 LBS | BODY MASS INDEX: 28.99 KG/M2 | HEIGHT: 72 IN | HEART RATE: 70 BPM | DIASTOLIC BLOOD PRESSURE: 87 MMHG

## 2018-04-20 DIAGNOSIS — H91.93 BILATERAL HEARING LOSS, UNSPECIFIED HEARING LOSS TYPE: ICD-10-CM

## 2018-04-20 DIAGNOSIS — H65.21 RIGHT CHRONIC SEROUS OTITIS MEDIA: ICD-10-CM

## 2018-04-20 DIAGNOSIS — H69.83 ETD (EUSTACHIAN TUBE DYSFUNCTION), BILATERAL: ICD-10-CM

## 2018-04-20 DIAGNOSIS — H70.12 MASTOIDITIS, CHRONIC, LEFT: Primary | ICD-10-CM

## 2018-04-20 PROCEDURE — 99213 OFFICE O/P EST LOW 20 MIN: CPT | Performed by: NURSE PRACTITIONER

## 2018-04-20 RX ORDER — FLUTICASONE PROPIONATE 50 MCG
SPRAY, SUSPENSION (ML) NASAL
Status: ON HOLD | COMMUNITY
Start: 2017-10-04 | End: 2019-01-22

## 2018-04-20 RX ORDER — GABAPENTIN 300 MG/1
1 CAPSULE ORAL
COMMUNITY
Start: 2017-04-28

## 2018-04-20 RX ORDER — OXYCODONE AND ACETAMINOPHEN 7.5; 325 MG/1; MG/1
TABLET ORAL
COMMUNITY
Start: 2017-09-22 | End: 2021-01-27

## 2018-04-20 RX ORDER — SILDENAFIL 100 MG/1
100 TABLET, FILM COATED ORAL
COMMUNITY
Start: 2017-12-20

## 2018-04-20 RX ORDER — NITROGLYCERIN 0.4 MG/1
0.4 TABLET SUBLINGUAL
COMMUNITY
End: 2018-08-02 | Stop reason: SDUPTHER

## 2018-04-20 RX ORDER — IBUPROFEN 800 MG/1
800 TABLET ORAL
COMMUNITY
Start: 2017-10-31

## 2018-04-20 NOTE — PROGRESS NOTES
YOB: 1945  Location: [facility]  Location Address: [address] [place] [address], [facility], [address] [place] [address]  Location Phone: [phone]    Chief Complaint   Patient presents with   • Cerumen Impaction       History of Present Illness  Prashanth Cantu is a 73 y.o. male.  Prashanth Cantu is here for follow up of ENT complaints. The patient has had problems with ear fullness, ear pressure, popping and cracking of the ear and decreased hearing  The symptoms are localized to the right side. The patient has had moderate symptoms. The symptoms have been present for the last several months The symptoms are aggravated by  no identifiable factors. The symptoms are improved by no identifiable factors.  He has previously had to travel to Dr Acevedo for a PET to be placed which he is finding more difficult.  He suspects fluid in ear again.  Hx of left mastoidectomy.  He stays on Flonase and Astelin.       Past Medical History:   Diagnosis Date   • Cholesteatoma    • Chronic mastoiditis    • Chronic otitis externa    • Eustachian tube dysfunction    • Hearing loss    • Heart disease    • Hx of myringotomy    • Hypertension    • Impacted cerumen    • Mastoid disorder    • Mastoiditis    • Polyp of right middle ear     granulation polyp of righttympanic membrane   • Tympanic membrane perforation        Past Surgical History:   Procedure Laterality Date   • CORONARY ANGIOPLASTY WITH STENT PLACEMENT     • SHOULDER SURGERY     • TYMPANOMASTOIDECTOMY  2015    blanca Acevedo       Outpatient Prescriptions Marked as Taking for the 18 encounter (Office Visit) with SANGEETA Madden   Medication Sig Dispense Refill   • aspirin 81 MG chewable tablet Chew 81 mg Daily.     • clopidogrel (PLAVIX) 75 MG tablet Take 75 mg by mouth Daily.     • fluticasone (FLONASE) 50 MCG/ACT nasal spray      • fluticasone-salmeterol (ADVAIR) 100-50 MCG/DOSE DISKUS Inhale 2 (Two) Times a Day.     • gabapentin (NEURONTIN) 300 MG capsule Take 1  capsule by mouth.     • ibuprofen (ADVIL,MOTRIN) 800 MG tablet Take 800 mg by mouth.     • ipratropium (ATROVENT) 0.06 % nasal spray 2 sprays into each nostril 3 (Three) Times a Day As Needed for rhinitis (FOR RUNNY NOSE). 15 mL 11   • lisinopril (PRINIVIL,ZESTRIL) 2.5 MG tablet Take 2.5 mg by mouth Daily.     • lovastatin (MEVACOR) 10 MG tablet Take 10 mg by mouth Every Night.     • nitroglycerin (NITROSTAT) 0.4 MG SL tablet Place 0.4 mg under the tongue.     • oxyCODONE-acetaminophen (PERCOCET) 7.5-325 MG per tablet      • sildenafil (VIAGRA) 100 MG tablet Take 100 mg by mouth.         Review of patient's allergies indicates no known allergies.    Family History   Problem Relation Age of Onset   • Cancer Mother        Social History     Social History   • Marital status:      Spouse name: N/A   • Number of children: N/A   • Years of education: N/A     Occupational History   • Not on file.     Social History Main Topics   • Smoking status: Current Every Day Smoker     Packs/day: 1.00   • Smokeless tobacco: Never Used   • Alcohol use No   • Drug use: Unknown   • Sexual activity: Not on file     Other Topics Concern   • Not on file     Social History Narrative   • No narrative on file       Review of Systems   Constitutional: Negative.    HENT:        SEE HPI   Eyes: Negative.    Respiratory: Negative.    Cardiovascular: Negative.    Gastrointestinal: Negative.    Endocrine: Negative.    Genitourinary: Negative.    Musculoskeletal: Negative.    Skin: Negative.    Allergic/Immunologic: Negative.    Neurological: Negative.    Hematological: Negative.    Psychiatric/Behavioral: Negative.        Vitals:    04/20/18 0948   BP: 151/87   Pulse: 70   Temp: 97.8 °F (36.6 °C)       Body mass index is 29.02 kg/m².    Objective     Physical Exam  CONSTITUTIONAL: well nourished, alert, oriented, in no acute distress     COMMUNICATION AND VOICE: able to communicate normally, normal voice quality    HEAD: normocephalic, no  lesions, atraumatic, no tenderness, no masses     FACE: appearance normal, no lesions, no tenderness, no deformities, facial motion symmetric    SALIVARY GLANDS: parotid glands with no tenderness, no swelling, no masses, submandibular glands with normal size, nontender    EYES: ocular motility normal, eyelids normal, orbits normal, no proptosis, conjunctiva normal , pupils equal, round     EARS:  Hearing: response to conversational voice severely impaired bilaterally   External Ears: auricles without lesions  Otoscopic: right TM dull with serous fluid, left mastoid cavity with severe debris removed with a suction, left TM dull.    NOSE:  External Nose: structure normal, no tenderness on palpation, no nasal discharge, no lesions, no evidence of trauma, nostrils patent   Intranasal Exam: nasal mucosa normal, vestibule within normal limits, inferior turbinate normal, nasal septum midline      ORAL:  Lips: upper and lower lips without lesion   Teeth: dentition within normal limits for age   Gums: gingivae healthy   Oral Mucosa: oral mucosa normal, no mucosal lesions   Floor of Mouth: Warthin’s duct patent, mucosa normal  Tongue: lingual mucosa normal without lesions, normal tongue mobility   Palate: soft and hard palates with normal mucosa and structure  Oropharynx: oropharyngeal mucosa normal    NECK: neck appearance normal, no mass,  noted without erythema or tenderness    LYMPH NODES: no lymphadenopathy    CHEST/RESPIRATORY: respiratory effort normal,     CARDIOVASCULAR:extremities without cyanosis or edema      NEUROLOGIC/PSYCHIATRIC: oriented to time, place and person, mood normal, affect appropriate, CN II-XII intact grossly    Assessment/Plan   Bill was seen today for cerumen impaction.    Diagnoses and all orders for this visit:    Mastoiditis, chronic, left    Bilateral hearing loss, unspecified hearing loss type    ETD (Eustachian tube dysfunction), bilateral    Right chronic serous otitis media      * Surgery  not found *  No orders of the defined types were placed in this encounter.    Return in about 3 weeks (around 5/11/2018).       Patient Instructions   PREOPERATIVE COUNSELING: A right myringotomy tubes were recommended.  The risks and benefits were explained including the risks not limited to bleeding, infection, risk of general anesthesia, persistent tympanic membrane perforation, chronic otorrhea, early extrusion, and possibility for the need of reinsertion after extrusion.  Alternatives were discussed.  The patient/parents understood these risks and wish to proceed.  Questions were asked appropriately and answered. Dr Carbajal May 9th      Patient and family were instructed on the proper use of scheduled prescription drugs including their impact on driving and the potential effects during pregnancy.  The potential for overdose was discussed and their safe storage and proper disposal.  The website www.kbml.[place].gov which contains other materials in this regard.    Dry ear precautions

## 2018-04-20 NOTE — PATIENT INSTRUCTIONS
PREOPERATIVE COUNSELING: A right myringotomy tubes were recommended.  The risks and benefits were explained including the risks not limited to bleeding, infection, risk of general anesthesia, persistent tympanic membrane perforation, chronic otorrhea, early extrusion, and possibility for the need of reinsertion after extrusion.  Alternatives were discussed.  The patient/parents understood these risks and wish to proceed.  Questions were asked appropriately and answered. Dr Carbajal May 9th      Patient and family were instructed on the proper use of scheduled prescription drugs including their impact on driving and the potential effects during pregnancy.  The potential for overdose was discussed and their safe storage and proper disposal.  The website www.mSnap.ky.gov which contains other materials in this regard.    Dry ear precautions

## 2018-05-09 ENCOUNTER — PROCEDURE VISIT (OUTPATIENT)
Dept: OTOLARYNGOLOGY | Facility: CLINIC | Age: 73
End: 2018-05-09

## 2018-05-09 VITALS
RESPIRATION RATE: 20 BRPM | TEMPERATURE: 97.8 F | SYSTOLIC BLOOD PRESSURE: 142 MMHG | BODY MASS INDEX: 28.99 KG/M2 | DIASTOLIC BLOOD PRESSURE: 85 MMHG | HEIGHT: 72 IN | WEIGHT: 214 LBS | HEART RATE: 80 BPM

## 2018-05-09 DIAGNOSIS — H69.83 ETD (EUSTACHIAN TUBE DYSFUNCTION), BILATERAL: ICD-10-CM

## 2018-05-09 DIAGNOSIS — H90.6 MIXED CONDUCTIVE AND SENSORINEURAL HEARING LOSS OF BOTH EARS: Primary | ICD-10-CM

## 2018-05-09 DIAGNOSIS — H65.21 RIGHT CHRONIC SEROUS OTITIS MEDIA: ICD-10-CM

## 2018-05-09 DIAGNOSIS — H74.92 MASTOID DISORDER, LEFT: ICD-10-CM

## 2018-05-09 DIAGNOSIS — H70.12 MASTOIDITIS, CHRONIC, LEFT: Primary | ICD-10-CM

## 2018-05-09 PROCEDURE — 69220 CLEAN OUT MASTOID CAVITY: CPT | Performed by: OTOLARYNGOLOGY

## 2018-05-09 PROCEDURE — 69420 INCISION OF EARDRUM: CPT | Performed by: OTOLARYNGOLOGY

## 2018-05-09 NOTE — PROGRESS NOTES
MYRINGOTOMY TUBE INSERTION     DATE OF OPERATION:  5/9/2018    PREOPERATIVE DIAGNOSIS:  Right-sided chronic otitis medial with effusion, eustachian tube dysfunction,    POSTOPERATIVE DIAGNOSIS:  Same.    PROCEDURE PERFORMED:  right myringotomy and tube insertion.    SURGEON: Armond Carbajal MD     ANESTHESIA: topical phenol    INDICATIONS:  Prashanth Cantu is a 73 y.o. male with the above diagnosis.  After failure of conservative medical management, myringotomy tube insertion was felt to be indicated.  The risks and benefits of myringotomy tube insertion were explained including but not limited to pain, aural fullness, bleeding, infection, risks of the anesthesia, persistent tympanic membrane perforation, chronic otorrhea, early and late extrusion, and the possibility for the need of reinsertion after extrusion. Alternatives were discussed. An understanding of these risks was demonstrated. Questions were asked appropriately answered.      ESTIMATED BLOOD LOSS:  Negligible.    SPECIMEN:  None.    COMPLICATIONS:  None.    FINDINGS:                     Right:  serous middle ear effusion    PROCEDURE DESCRIPTION:  The patient was taken back to the treatment room and placed in the reclined position on the procedure chair. A time out was performed. The operating microscope was wheeled into view. Using the speculum and curette, the external auditory canal was cleaned of its cerumen and this exposed the tympanic membrane. A myringotomy was created in a radial fashion. After suctioning, a T-tube was placed in the myringotomy.   No drops were placed. There were no complications during the case.

## 2018-05-09 NOTE — PROGRESS NOTES
CASE HISTORY DETAILS   Mr. Cantu presented to the clinic this date with complaints of decreased hearing and possible ME effusion in the right ear. He has previous history of ear infection and has had multiple tubes placed in the right ear.  He has history of tympmastoidectomy in the left ear and reported recurrent otorrhea.        SUMMARY   RIGHT  · Otoscopy revealed PE tube visualized in ear canal.  · Mild sloping mixed hearing loss.  · Immitance measures suggest possible middle ear fluid.    LEFT  · Otoscopy revealed drainage in the ear canal.  · Profound flat hearing loss.  · Immitance measures were not tested due to drainage in ear canal.    Unable to mask BC thresholds due to severity of AC thresholds, however, based on previous test results there is likely a bilateral conductive component to hearing loss.    It was recommended Mr. Cantu discontinue wearing the left hearing aid due to recurrent infection.    RECOMMENDATIONS   Results of today's evaluation were discussed with Mr. Cantu and the following recommendations were made:  1. ENT evaluation today as scheduled.  2. Recheck hearing following medical management.    AUDIOGRAM AND IMMITANCE       Ivory Saldana, CCC-A  Audiologist

## 2018-05-09 NOTE — PROGRESS NOTES
Procedure Note    Pre-operative Diagnosis: Cerumen and debris accumulation s/p modified radical mastoidectomy    Post-operative Diagnosis: same    Anesthesia: none    Procedure:  Binocular ear microscopy with debridement of left mastoid cavity    Procedure Details:    The patient was placed supine on the procedure table. Using a speculum, the ear was examined with a microscope. The mastoid cavity was debrided.     Findings: Mastoid bowl is well-healed without any evidence of recurrent cholesteatoma.  There is mucosalization anteriorly.  The patient has there are drops at home for this.    Condition:  Stable.  Patient tolerated procedure well.    Complications:  None

## 2018-05-09 NOTE — PATIENT INSTRUCTIONS
##### TOBACCO CESSATION #####   IF YOU SMOKE OR USE TOBACCO PLEASE READ THE FOLLOWING:    Why is smoking bad for me?  Smoking increases the risk of heart disease, lung disease, vascular disease, stroke, and cancer.     If you smoke, STOP!    If you would like more information on quitting smoking, please visit the [employer] website: [email or web address]   This link will provide additional resources including the QUIT line and the Beat the Pack support groups.     For more information:    Quit Now [place]  1-800-QUIT-NOW  https://[place].quitlogix.org/en-[place]/  ###### BMI  #####   MyPlate from [employer]  The general, healthful diet is based on the 2010 Dietary Guidelines for Americans. The amount of food you need to eat from each food group depends on your age, sex, and level of physical activity and can be individualized by a dietitian. Go to ChooseMyPlate.gov for more information.  What do I need to know about the MyPlate plan?  · Enjoy your food, but eat less.  · Avoid oversized portions.  ¨ ½ of your plate should include fruits and vegetables.  ¨ ¼ of your plate should be grains.  ¨ ¼ of your plate should be protein.  Grains   · Make at least half of your grains whole grains.  · For a 2,000 calorie daily food plan, eat 6 oz every day.  · 1 oz is about 1 slice bread, 1 cup cereal, or ½ cup cooked rice, cereal, or pasta.  Vegetables   · Make half your plate fruits and vegetables.  · For a 2,000 calorie daily food plan, eat 2½ cups every day.  · 1 cup is about 1 cup raw or cooked vegetables or vegetable juice or 2 cups raw leafy greens.  Fruits   · Make half your plate fruits and vegetables.  · For a 2,000 calorie daily food plan, eat 2 cups every day.  · 1 cup is about 1 cup fruit or 100% fruit juice or ½ cup dried fruit.  Protein   · For a 2,000 calorie daily food plan, eat 5½ oz every day.  · 1 oz is about 1 oz meat, poultry, or fish, ¼ cup cooked beans, 1 egg, 1 Tbsp  peanut butter, or ½ oz nuts or seeds.  Dairy   · Switch to fat-free or low-fat (1%) milk.  · For a 2,000 calorie daily food plan, eat 3 cups every day.  · 1 cup is about 1 cup milk or yogurt or soy milk (soy beverage), 1½ oz natural cheese, or 2 oz processed cheese.  Fats, Oils, and Empty Calories   · Only small amounts of oils are recommended.  · Empty calories are calories from solid fats or added sugars.  · Compare sodium in foods like soup, bread, and frozen meals. Choose the foods with lower numbers.  · Drink water instead of sugary drinks.  What foods can I eat?  Grains   Whole grains such as whole wheat, quinoa, millet, and bulgur. Bread, rolls, and pasta made from whole grains. Brown or wild rice. Hot or cold cereals made from whole grains and without added sugar.  Vegetables   All fresh vegetables, especially fresh red, dark green, or orange vegetables. Peas and beans. Low-sodium frozen or canned vegetables prepared without added salt. Low-sodium vegetable juices.  Fruits   All fresh, frozen, and dried fruits. Canned fruit packed in water or fruit juice without added sugar. Fruit juices without added sugar.  Meats and Other Protein Sources   Boiled, baked, or grilled lean meat trimmed of fat. Skinless poultry. Fresh seafood and shellfish. Canned seafood packed in water. Unsalted nuts and unsalted nut butters. Tofu. Dried beans and pea. Eggs.  Dairy   Low-fat or fat-free milk, yogurt, and cheeses.  Sweets and Desserts   Frozen desserts made from low-fat milk.  Fats and Oils   Olive, peanut, and canola oils and margarine. Salad dressing and mayonnaise made from these oils.  Other   Soups and casseroles made from allowed ingredients and without added fat or salt.  The items listed above may not be a complete list of recommended foods or beverages. Contact your dietitian for more options.   What foods are not recommended?  Grains   Sweetened, low-fiber cereals. Packaged baked goods. Snack crackers and chips.  Cheese crackers, butter crackers, and biscuits. Frozen waffles, sweet breads, doughnuts, pastries, packaged baking mixes, pancakes, cakes, and cookies.  Vegetables   Regular canned or frozen vegetables or vegetables prepared with salt. Canned tomatoes. Canned tomato sauce. Fried vegetables. Vegetables in cream sauce or cheese sauce.  Fruits   Fruits packed in syrup or made with added sugar.  Meats and Other Protein Sources   Marbled or fatty meats such as ribs. Poultry with skin. Fried meats, poultry, eggs, or fish. Sausages, hot dogs, and deli meats such as pastrami, bologna, or salami.  Dairy   Whole milk, cream, cheeses made from whole milk, sour cream. Ice cream or yogurt made from whole milk or with added sugar.  Beverages   For adults, no more than one alcoholic drink per day. Regular soft drinks or other sugary beverages. Juice drinks.  Sweets and Desserts   Sugary or fatty desserts, candy, and other sweets.  Fats and Oils   Solid shortening or partially hydrogenated oils. Solid margarine. Margarine that contains trans fats. Butter.  The items listed above may not be a complete list of foods and beverages to avoid. Contact your dietitian for more information.   This information is not intended to replace advice given to you by your health care provider. Make sure you discuss any questions you have with your health care provider.  Document Released: 01/06/2009 Document Revised: 05/25/2017 Document Reviewed: 11/26/2014  [employer] Interactive Patient Education © 2017 [employer]     Calorie Counting for Weight Loss  Calories are units of energy. Your body needs a certain amount of calories from food to keep you going throughout the day. When you eat more calories than your body needs, your body stores the extra calories as fat. When you eat fewer calories than your body needs, your body burns fat to get the energy it needs.  Calorie counting means keeping track of how many calories you eat and drink each day.  Calorie counting can be helpful if you need to lose weight. If you make sure to eat fewer calories than your body needs, you should lose weight. Ask your health care provider what a healthy weight is for you.  For calorie counting to work, you will need to eat the right number of calories in a day in order to lose a healthy amount of weight per week. A dietitian can help you determine how many calories you need in a day and will give you suggestions on how to reach your calorie goal.  · A healthy amount of weight to lose per week is usually 1-2 lb (0.5-0.9 kg). This usually means that your daily calorie intake should be reduced by 500-750 calories.  · Eating 1,200 - 1,500 calories per day can help most women lose weight.  · Eating 1,500 - 1,800 calories per day can help most men lose weight.  What is my plan?  My goal is to have __________ calories per day.  If I have this many calories per day, I should lose around __________ pounds per week.  What do I need to know about calorie counting?  In order to meet your daily calorie goal, you will need to:  · Find out how many calories are in each food you would like to eat. Try to do this before you eat.  · Decide how much of the food you plan to eat.  · Write down what you ate and how many calories it had. Doing this is called keeping a food log.  To successfully lose weight, it is important to balance calorie counting with a healthy lifestyle that includes regular activity. Aim for 150 minutes of moderate exercise (such as walking) or 75 minutes of vigorous exercise (such as running) each week.  Where do I find calorie information?     The number of calories in a food can be found on a Nutrition Facts label. If a food does not have a Nutrition Facts label, try to look up the calories online or ask your dietitian for help.  Remember that calories are listed per serving. If you choose to have more than one serving of a food, you will have to multiply the calories per  "serving by the amount of servings you plan to eat. For example, the label on a package of bread might say that a serving size is 1 slice and that there are 90 calories in a serving. If you eat 1 slice, you will have eaten 90 calories. If you eat 2 slices, you will have eaten 180 calories.  How do I keep a food log?  Immediately after each meal, record the following information in your food log:  · What you ate. Don't forget to include toppings, sauces, and other extras on the food.  · How much you ate. This can be measured in cups, ounces, or number of items.  · How many calories each food and drink had.  · The total number of calories in the meal.  Keep your food log near you, such as in a small notebook in your pocket, or use a mobile marisa or website. Some programs will calculate calories for you and show you how many calories you have left for the day to meet your goal.  What are some calorie counting tips?  · Use your calories on foods and drinks that will fill you up and not leave you hungry:  ¨ Some examples of foods that fill you up are nuts and nut butters, vegetables, lean proteins, and high-fiber foods like whole grains. High-fiber foods are foods with more than 5 g fiber per serving.  ¨ Drinks such as sodas, specialty coffee drinks, alcohol, and juices have a lot of calories, yet do not fill you up.  · Eat nutritious foods and avoid empty calories. Empty calories are calories you get from foods or beverages that do not have many vitamins or protein, such as candy, sweets, and soda. It is better to have a nutritious high-calorie food (such as an avocado) than a food with few nutrients (such as a bag of chips).  · Know how many calories are in the foods you eat most often. This will help you calculate calorie counts faster.  · Pay attention to calories in drinks. Low-calorie drinks include water and unsweetened drinks.  · Pay attention to nutrition labels for \"low fat\" or \"fat free\" foods. These foods " sometimes have the same amount of calories or more calories than the full fat versions. They also often have added sugar, starch, or salt, to make up for flavor that was removed with the fat.  · Find a way of tracking calories that works for you. Get creative. Try different apps or programs if writing down calories does not work for you.  What are some portion control tips?  · Know how many calories are in a serving. This will help you know how many servings of a certain food you can have.  · Use a measuring cup to measure serving sizes. You could also try weighing out portions on a kitchen scale. With time, you will be able to estimate serving sizes for some foods.  · Take some time to put servings of different foods on your favorite plates, bowls, and cups so you know what a serving looks like.  · Try not to eat straight from a bag or box. Doing this can lead to overeating. Put the amount you would like to eat in a cup or on a plate to make sure you are eating the right portion.  · Use smaller plates, glasses, and bowls to prevent overeating.  · Try not to multitask (for example, watch TV or use your computer) while eating. If it is time to eat, sit down at a table and enjoy your food. This will help you to know when you are full. It will also help you to be aware of what you are eating and how much you are eating.  What are tips for following this plan?  Reading food labels   · Check the calorie count compared to the serving size. The serving size may be smaller than what you are used to eating.  · Check the source of the calories. Make sure the food you are eating is high in vitamins and protein and low in saturated and trans fats.  Shopping   · Read nutrition labels while you shop. This will help you make healthy decisions before you decide to purchase your food.  · Make a grocery list and stick to it.  Cooking   · Try to cook your favorite foods in a healthier way. For example, try baking instead of  frying.  · Use low-fat dairy products.  Meal planning   · Use more fruits and vegetables. Half of your plate should be fruits and vegetables.  · Include lean proteins like poultry and fish.  How do I count calories when eating out?  · Ask for smaller portion sizes.  · Consider sharing an entree and sides instead of getting your own entree.  · If you get your own entree, eat only half. Ask for a box at the beginning of your meal and put the rest of your entree in it so you are not tempted to eat it.  · If calories are listed on the menu, choose the lower calorie options.  · Choose dishes that include vegetables, fruits, whole grains, low-fat dairy products, and lean protein.  · Choose items that are boiled, broiled, grilled, or steamed. Stay away from items that are buttered, battered, fried, or served with cream sauce. Items labeled “crispy” are usually fried, unless stated otherwise.  · Choose water, low-fat milk, unsweetened iced tea, or other drinks without added sugar. If you want an alcoholic beverage, choose a lower calorie option such as a glass of wine or light beer.  · Ask for dressings, sauces, and syrups on the side. These are usually high in calories, so you should limit the amount you eat.  · If you want a salad, choose a garden salad and ask for grilled meats. Avoid extra toppings like mercado, cheese, or fried items. Ask for the dressing on the side, or ask for olive oil and vinegar or lemon to use as dressing.  · Estimate how many servings of a food you are given. For example, a serving of cooked rice is ½ cup or about the size of half a baseball. Knowing serving sizes will help you be aware of how much food you are eating at restaurants. The list below tells you how big or small some common portion sizes are based on everyday objects:  ¨ 1 oz--4 stacked dice.  ¨ 3 oz--1 deck of cards.  ¨ 1 tsp--1 die.  ¨ 1 Tbsp--½ a ping-pong ball.  ¨ 2 Tbsp--1 ping-pong ball.  ¨ ½ cup--½ baseball.  ¨ 1 cup--1  baseball.  Summary  · Calorie counting means keeping track of how many calories you eat and drink each day. If you eat fewer calories than your body needs, you should lose weight.  · A healthy amount of weight to lose per week is usually 1-2 lb (0.5-0.9 kg). This usually means reducing your daily calorie intake by 500-750 calories.  · The number of calories in a food can be found on a Nutrition Facts label. If a food does not have a Nutrition Facts label, try to look up the calories online or ask your dietitian for help.  · Use your calories on foods and drinks that will fill you up, and not on foods and drinks that will leave you hungry.  · Use smaller plates, glasses, and bowls to prevent overeating.  This information is not intended to replace advice given to you by your health care provider. Make sure you discuss any questions you have with your health care provider.  Document Released: 12/18/2006 Document Revised: 11/17/2017 Document Reviewed: 11/17/2017  [employer] Interactive Patient Education © 2017 [employer]     Exercising to Lose Weight  Exercising can help you to lose weight. In order to lose weight through exercise, you need to do vigorous-intensity exercise. You can tell that you are exercising with vigorous intensity if you are breathing very hard and fast and cannot hold a conversation while exercising.  Moderate-intensity exercise helps to maintain your current weight. You can tell that you are exercising at a moderate level if you have a higher heart rate and faster breathing, but you are still able to hold a conversation.  How often should I exercise?  Choose an activity that you enjoy and set realistic goals. Your health care provider can help you to make an activity plan that works for you. Exercise regularly as directed by your health care provider. This may include:  · Doing resistance training twice each week, such as:  ¨ Push-ups.  ¨ Sit-ups.  ¨ Lifting weights.  ¨ Using resistance  bands.  · Doing a given intensity of exercise for a given amount of time. Choose from these options:  ¨ 150 minutes of moderate-intensity exercise every week.  ¨ 75 minutes of vigorous-intensity exercise every week.  ¨ A mix of moderate-intensity and vigorous-intensity exercise every week.  Children, pregnant women, people who are out of shape, people who are overweight, and older adults may need to consult a health care provider for individual recommendations. If you have any sort of medical condition, be sure to consult your health care provider before starting a new exercise program.  What are some activities that can help me to lose weight?  · Walking at a rate of at least 4.5 miles an hour.  · Jogging or running at a rate of 5 miles per hour.  · Biking at a rate of at least 10 miles per hour.  · Lap swimming.  · Roller-skating or in-line skating.  · Cross-country skiing.  · Vigorous competitive sports, such as football, basketball, and soccer.  · Jumping rope.  · Aerobic dancing.  How can I be more active in my day-to-day activities?  · Use the stairs instead of the elevator.  · Take a walk during your lunch break.  · If you drive, park your car farther away from work or school.  · If you take public transportation, get off one stop early and walk the rest of the way.  · Make all of your phone calls while standing up and walking around.  · Get up, stretch, and walk around every 30 minutes throughout the day.  What guidelines should I follow while exercising?  · Do not exercise so much that you hurt yourself, feel dizzy, or get very short of breath.  · Consult your health care provider prior to starting a new exercise program.  · Wear comfortable clothes and shoes with good support.  · Drink plenty of water while you exercise to prevent dehydration or heat stroke. Body water is lost during exercise and must be replaced.  · Work out until you breathe faster and your heart beats faster.  This information is not  intended to replace advice given to you by your health care provider. Make sure you discuss any questions you have with your health care provider.  Document Released: 01/20/2012 Document Revised: 05/25/2017 Document Reviewed: 05/21/2015  [employer] Interactive Patient Education © 2017 [employer]

## 2018-05-29 DIAGNOSIS — E78.00 PURE HYPERCHOLESTEROLEMIA: ICD-10-CM

## 2018-05-29 DIAGNOSIS — I25.10 CORONARY ARTERY DISEASE INVOLVING NATIVE CORONARY ARTERY OF NATIVE HEART WITHOUT ANGINA PECTORIS: ICD-10-CM

## 2018-05-29 DIAGNOSIS — J44.9 CHRONIC OBSTRUCTIVE PULMONARY DISEASE, UNSPECIFIED COPD TYPE (HCC): ICD-10-CM

## 2018-05-29 DIAGNOSIS — I10 ESSENTIAL HYPERTENSION: ICD-10-CM

## 2018-05-29 DIAGNOSIS — R41.9 COGNITIVE COMPLAINTS: ICD-10-CM

## 2018-05-29 LAB
ALBUMIN SERPL-MCNC: 4.5 G/DL (ref 3.5–5.2)
ALP BLD-CCNC: 68 U/L (ref 40–130)
ALT SERPL-CCNC: 21 U/L (ref 5–41)
ANION GAP SERPL CALCULATED.3IONS-SCNC: 11 MMOL/L (ref 7–19)
AST SERPL-CCNC: 18 U/L (ref 5–40)
BILIRUB SERPL-MCNC: 1.1 MG/DL (ref 0.2–1.2)
BUN BLDV-MCNC: 20 MG/DL (ref 8–23)
CALCIUM SERPL-MCNC: 9.4 MG/DL (ref 8.8–10.2)
CHLORIDE BLD-SCNC: 106 MMOL/L (ref 98–111)
CO2: 25 MMOL/L (ref 22–29)
CREAT SERPL-MCNC: 0.8 MG/DL (ref 0.5–1.2)
GFR NON-AFRICAN AMERICAN: >60
GLUCOSE BLD-MCNC: 93 MG/DL (ref 74–109)
LDL CHOLESTEROL DIRECT: 75 MG/DL
POTASSIUM SERPL-SCNC: 4.3 MMOL/L (ref 3.5–5)
SODIUM BLD-SCNC: 142 MMOL/L (ref 136–145)
TOTAL PROTEIN: 6.8 G/DL (ref 6.6–8.7)

## 2018-06-06 ENCOUNTER — OFFICE VISIT (OUTPATIENT)
Dept: INTERNAL MEDICINE | Age: 73
End: 2018-06-06
Payer: MEDICARE

## 2018-06-06 VITALS
SYSTOLIC BLOOD PRESSURE: 118 MMHG | OXYGEN SATURATION: 95 % | DIASTOLIC BLOOD PRESSURE: 72 MMHG | BODY MASS INDEX: 28.17 KG/M2 | WEIGHT: 208 LBS | RESPIRATION RATE: 16 BRPM | HEIGHT: 72 IN | HEART RATE: 74 BPM

## 2018-06-06 DIAGNOSIS — I25.10 CORONARY ARTERY DISEASE INVOLVING NATIVE CORONARY ARTERY OF NATIVE HEART WITHOUT ANGINA PECTORIS: ICD-10-CM

## 2018-06-06 DIAGNOSIS — E78.00 PURE HYPERCHOLESTEROLEMIA: ICD-10-CM

## 2018-06-06 DIAGNOSIS — G89.4 CHRONIC PAIN SYNDROME: ICD-10-CM

## 2018-06-06 DIAGNOSIS — Z78.9 HEALTH MAINTENANCE ALTERATION: ICD-10-CM

## 2018-06-06 DIAGNOSIS — N42.9 DISORDER OF PROSTATE: ICD-10-CM

## 2018-06-06 DIAGNOSIS — G62.9 NEUROPATHY: ICD-10-CM

## 2018-06-06 DIAGNOSIS — I10 ESSENTIAL HYPERTENSION: Primary | ICD-10-CM

## 2018-06-06 PROCEDURE — 99214 OFFICE O/P EST MOD 30 MIN: CPT | Performed by: NURSE PRACTITIONER

## 2018-06-06 PROCEDURE — 4040F PNEUMOC VAC/ADMIN/RCVD: CPT | Performed by: NURSE PRACTITIONER

## 2018-06-06 PROCEDURE — G8419 CALC BMI OUT NRM PARAM NOF/U: HCPCS | Performed by: NURSE PRACTITIONER

## 2018-06-06 PROCEDURE — 3017F COLORECTAL CA SCREEN DOC REV: CPT | Performed by: NURSE PRACTITIONER

## 2018-06-06 PROCEDURE — G8427 DOCREV CUR MEDS BY ELIG CLIN: HCPCS | Performed by: NURSE PRACTITIONER

## 2018-06-06 PROCEDURE — G8598 ASA/ANTIPLAT THER USED: HCPCS | Performed by: NURSE PRACTITIONER

## 2018-06-06 PROCEDURE — 1123F ACP DISCUSS/DSCN MKR DOCD: CPT | Performed by: NURSE PRACTITIONER

## 2018-06-06 PROCEDURE — 4004F PT TOBACCO SCREEN RCVD TLK: CPT | Performed by: NURSE PRACTITIONER

## 2018-06-06 RX ORDER — NITROGLYCERIN 0.4 MG/1
0.4 TABLET SUBLINGUAL EVERY 5 MIN PRN
Qty: 25 TABLET | Refills: 1 | Status: SHIPPED | OUTPATIENT
Start: 2018-06-06 | End: 2019-07-25 | Stop reason: SDUPTHER

## 2018-06-06 ASSESSMENT — ENCOUNTER SYMPTOMS
WHEEZING: 0
DIARRHEA: 0
SHORTNESS OF BREATH: 0
EYE ITCHING: 0
NAUSEA: 0
EYE DISCHARGE: 0
CHOKING: 0
STRIDOR: 0
ABDOMINAL PAIN: 0
BLOOD IN STOOL: 0
CONSTIPATION: 0
TROUBLE SWALLOWING: 0
VOMITING: 0
SORE THROAT: 0
ABDOMINAL DISTENTION: 0
COLOR CHANGE: 0
COUGH: 0
BACK PAIN: 1

## 2018-06-22 RX ORDER — AZELASTINE 1 MG/ML
SPRAY, METERED NASAL
Qty: 1 EACH | Refills: 6 | OUTPATIENT
Start: 2018-06-22

## 2018-06-27 RX ORDER — AZELASTINE 1 MG/ML
SPRAY, METERED NASAL
Qty: 1 EACH | Refills: 6 | Status: SHIPPED | OUTPATIENT
Start: 2018-06-27 | End: 2019-10-30 | Stop reason: SDUPTHER

## 2018-07-05 ENCOUNTER — OFFICE VISIT (OUTPATIENT)
Dept: INTERNAL MEDICINE | Age: 73
End: 2018-07-05
Payer: MEDICARE

## 2018-07-05 VITALS
BODY MASS INDEX: 28.42 KG/M2 | DIASTOLIC BLOOD PRESSURE: 80 MMHG | HEIGHT: 72 IN | OXYGEN SATURATION: 97 % | TEMPERATURE: 97.6 F | WEIGHT: 209.8 LBS | HEART RATE: 69 BPM | SYSTOLIC BLOOD PRESSURE: 132 MMHG

## 2018-07-05 DIAGNOSIS — G89.29 CHRONIC NONINTRACTABLE HEADACHE, UNSPECIFIED HEADACHE TYPE: ICD-10-CM

## 2018-07-05 DIAGNOSIS — M54.2 CHRONIC NECK PAIN: Primary | ICD-10-CM

## 2018-07-05 DIAGNOSIS — R51.9 CHRONIC NONINTRACTABLE HEADACHE, UNSPECIFIED HEADACHE TYPE: ICD-10-CM

## 2018-07-05 DIAGNOSIS — G89.29 CHRONIC NECK PAIN: Primary | ICD-10-CM

## 2018-07-05 PROCEDURE — G8427 DOCREV CUR MEDS BY ELIG CLIN: HCPCS | Performed by: PHYSICIAN ASSISTANT

## 2018-07-05 PROCEDURE — G8419 CALC BMI OUT NRM PARAM NOF/U: HCPCS | Performed by: PHYSICIAN ASSISTANT

## 2018-07-05 PROCEDURE — 3017F COLORECTAL CA SCREEN DOC REV: CPT | Performed by: PHYSICIAN ASSISTANT

## 2018-07-05 PROCEDURE — G8598 ASA/ANTIPLAT THER USED: HCPCS | Performed by: PHYSICIAN ASSISTANT

## 2018-07-05 PROCEDURE — 4040F PNEUMOC VAC/ADMIN/RCVD: CPT | Performed by: PHYSICIAN ASSISTANT

## 2018-07-05 PROCEDURE — 4004F PT TOBACCO SCREEN RCVD TLK: CPT | Performed by: PHYSICIAN ASSISTANT

## 2018-07-05 PROCEDURE — 1123F ACP DISCUSS/DSCN MKR DOCD: CPT | Performed by: PHYSICIAN ASSISTANT

## 2018-07-05 PROCEDURE — 99213 OFFICE O/P EST LOW 20 MIN: CPT | Performed by: PHYSICIAN ASSISTANT

## 2018-07-05 RX ORDER — TIZANIDINE 4 MG/1
4 TABLET ORAL 3 TIMES DAILY
Qty: 30 TABLET | Refills: 5 | Status: SHIPPED | OUTPATIENT
Start: 2018-07-05 | End: 2022-06-13

## 2018-07-05 RX ORDER — MELOXICAM 15 MG/1
15 TABLET ORAL DAILY
Qty: 30 TABLET | Refills: 5 | Status: SHIPPED | OUTPATIENT
Start: 2018-07-05 | End: 2018-12-26 | Stop reason: ALTCHOICE

## 2018-07-05 ASSESSMENT — ENCOUNTER SYMPTOMS
COLOR CHANGE: 0
BACK PAIN: 0
VOMITING: 0
SORE THROAT: 0
ABDOMINAL PAIN: 0
SHORTNESS OF BREATH: 0
EYE REDNESS: 0
EYE PAIN: 0
COUGH: 0
DIARRHEA: 0
PHOTOPHOBIA: 0
NAUSEA: 0
WHEEZING: 0
CHEST TIGHTNESS: 0
CONSTIPATION: 0
RHINORRHEA: 0
SINUS PRESSURE: 0

## 2018-07-05 NOTE — PROGRESS NOTES
Father        No Known Allergies    Social History     Social History    Marital status:      Spouse name: N/A    Number of children: N/A    Years of education: N/A     Occupational History    Not on file. Social History Main Topics    Smoking status: Current Every Day Smoker     Packs/day: 1.00     Years: 30.00     Types: Cigarettes    Smokeless tobacco: Never Used    Alcohol use No    Drug use: Unknown    Sexual activity: Not on file     Other Topics Concern    Not on file     Social History Narrative    No narrative on file       Review of Systems  Review of Systems   Constitutional: Negative for activity change, appetite change, fatigue and unexpected weight change. HENT: Negative for ear pain, postnasal drip, rhinorrhea, sinus pressure, sneezing and sore throat. Eyes: Negative for photophobia, pain and redness. Respiratory: Negative for cough, chest tightness, shortness of breath and wheezing. Cardiovascular: Negative for chest pain, palpitations and leg swelling. Gastrointestinal: Negative for abdominal pain, constipation, diarrhea, nausea and vomiting. Genitourinary: Negative for dysuria, frequency, hematuria and urgency. Musculoskeletal: Positive for neck pain (Base of the skull The right side) and neck stiffness. Negative for arthralgias, back pain, gait problem, joint swelling and myalgias. Skin: Negative for color change, pallor and wound. Neurological: Negative for dizziness, speech difficulty, weakness, light-headedness, numbness and headaches. Hematological: Negative for adenopathy. Does not bruise/bleed easily. Psychiatric/Behavioral: Negative for confusion. The patient is not nervous/anxious.             Current Outpatient Prescriptions   Medication Sig Dispense Refill    meloxicam (MOBIC) 15 MG tablet Take 1 tablet by mouth daily 30 tablet 5    tiZANidine (ZANAFLEX) 4 MG tablet Take 1 tablet by mouth 3 times daily 30 tablet 5    nitroGLYCERIN (NITROSTAT) 0.4 MG SL tablet Place 1 tablet under the tongue every 5 minutes as needed for Chest pain up to max of 3 total doses. If no relief after 1 dose, call 911. 25 tablet 1    ADVAIR DISKUS 250-50 MCG/DOSE AEPB INHALE ONE DOSE BY MOUTH TWICE DAILY 3 Inhaler 3    lisinopril (PRINIVIL;ZESTRIL) 2.5 MG tablet Take 1 tablet by mouth daily 90 tablet 3    clopidogrel (PLAVIX) 75 MG tablet Take 1 tablet by mouth daily 90 tablet 3    lovastatin (MEVACOR) 20 MG tablet Take 1 tablet by mouth nightly 90 tablet 3    sildenafil (VIAGRA) 100 MG tablet Take 1 tablet by mouth as needed for Erectile Dysfunction 10 tablet 1    Cyanocobalamin (VITAMIN B 12 PO) Take 1 tablet by mouth daily      aspirin 81 MG EC tablet Take 81 mg by mouth daily      oxyCODONE-acetaminophen (PERCOCET) 7.5-325 MG per tablet       fluticasone (FLONASE) 50 MCG/ACT nasal spray       ibuprofen (ADVIL;MOTRIN) 800 MG tablet Take 1 tablet by mouth 2 times daily as needed for Pain 120 tablet 1     No current facility-administered medications for this visit. /80   Pulse 69   Temp 97.6 °F (36.4 °C)   Ht 6' (1.829 m)   Wt 209 lb 12.8 oz (95.2 kg)   SpO2 97%   BMI 28.45 kg/m²     PHYSICAL EXAM  Physical Exam   Constitutional: Vital signs are normal. He appears well-developed and well-nourished. HENT:   Head: Normocephalic and atraumatic. Right Ear: External ear normal.   Left Ear: External ear normal.   Nose: Nose normal.   Eyes: Pupils are equal, round, and reactive to light. Right eye exhibits no discharge. Left eye exhibits no discharge. Neck: Normal range of motion. No tracheal deviation present. Cardiovascular: Normal rate, regular rhythm and normal heart sounds. Exam reveals no gallop and no friction rub. No murmur heard. Pulmonary/Chest: Effort normal and breath sounds normal. No respiratory distress. He has no wheezes. He has no rales. He exhibits no tenderness. Abdominal: Soft. There is no tenderness. There is no rebound and no guarding. Musculoskeletal: Normal range of motion. He exhibits no tenderness or deformity. Lymphadenopathy:     He has no cervical adenopathy. Neurological: He is alert. No cranial nerve deficit. Skin: Skin is warm, dry and intact. No lesion and no rash noted. No erythema. Psychiatric: He has a normal mood and affect. His mood appears not anxious. He does not exhibit a depressed mood. Nursing note and vitals reviewed. ASSESSMENT      ICD-10-CM ICD-9-CM    1. Chronic neck pain M54.2 723.1 meloxicam (MOBIC) 15 MG tablet    G89.29 338.29    2. Chronic nonintractable headache, unspecified headache type R51 784.0 tiZANidine (ZANAFLEX) 4 MG tablet       PLAN  Discussed with patient that I will try to start him on meloxicam 15 mg daily. Discussed with patient not to take anymore ibuprofen. Also start patient on Zanaflex 4 mg up to 3 times a day as needed for neck pain and tightness. Discussed with patient that this could be tightness in the back of his neck this pulling down the skull causing aching pain. Patient follow-up as needed if not improving. Return if symptoms worsen or fail to improve. All questions answered. Patient voices understanding and agrees to plans along with risks and benefits of plan. Patient is instructed to continue prior medications, diet, and exercise plans as instructed. Patient agrees to follow up as instructions, sooner if needed, or to go to ER if condition becomes emergent. Additional Instructions: As always, patient is advised to bring in medication bottles in order to correctly reconcile with our current list.      Sofia Sarabia PA-C    EMR Dragon/transcription disclaimer: Much of this encounter note is electronic transcription/translation of spoken language to printed text.  The electronic translation of spoken language may be erroneous, or at times, nonsensical words or phrases may be inadvertently

## 2018-07-16 RX ORDER — FLUTICASONE PROPIONATE 50 MCG
SPRAY, SUSPENSION (ML) NASAL
Qty: 1 BOTTLE | Refills: 3 | Status: SHIPPED | OUTPATIENT
Start: 2018-07-16 | End: 2019-04-24 | Stop reason: SDUPTHER

## 2018-08-02 ENCOUNTER — OFFICE VISIT (OUTPATIENT)
Dept: OTOLARYNGOLOGY | Facility: CLINIC | Age: 73
End: 2018-08-02

## 2018-08-02 VITALS
BODY MASS INDEX: 28.71 KG/M2 | HEIGHT: 72 IN | DIASTOLIC BLOOD PRESSURE: 82 MMHG | TEMPERATURE: 97.5 F | SYSTOLIC BLOOD PRESSURE: 142 MMHG | WEIGHT: 212 LBS

## 2018-08-02 DIAGNOSIS — H92.11 OTORRHEA OF RIGHT EAR: ICD-10-CM

## 2018-08-02 DIAGNOSIS — H69.83 DYSFUNCTION OF BOTH EUSTACHIAN TUBES: Primary | ICD-10-CM

## 2018-08-02 DIAGNOSIS — H65.33 CHRONIC MUCOID OTITIS MEDIA OF BOTH EARS: ICD-10-CM

## 2018-08-02 DIAGNOSIS — Z86.69 HISTORY OF CHOLESTEATOMA: ICD-10-CM

## 2018-08-02 PROCEDURE — 69220 CLEAN OUT MASTOID CAVITY: CPT | Performed by: NURSE PRACTITIONER

## 2018-08-02 PROCEDURE — 99214 OFFICE O/P EST MOD 30 MIN: CPT | Performed by: NURSE PRACTITIONER

## 2018-08-02 RX ORDER — NITROGLYCERIN 0.4 MG/1
0.4 TABLET SUBLINGUAL
COMMUNITY
Start: 2018-06-06

## 2018-08-02 RX ORDER — MELOXICAM 15 MG/1
15 TABLET ORAL
Status: ON HOLD | COMMUNITY
Start: 2018-07-05 | End: 2019-01-22

## 2018-08-02 RX ORDER — TIZANIDINE 4 MG/1
4 TABLET ORAL
COMMUNITY
Start: 2018-07-05

## 2018-08-02 RX ORDER — NEOMYCIN SULFATE, POLYMYXIN B SULFATE, HYDROCORTISONE 3.5; 10000; 1 MG/ML; [USP'U]/ML; MG/ML
3 SOLUTION/ DROPS AURICULAR (OTIC) 3 TIMES DAILY
Qty: 10 ML | Refills: 2 | Status: SHIPPED | OUTPATIENT
Start: 2018-08-02 | End: 2018-09-13

## 2018-08-02 NOTE — PATIENT INSTRUCTIONS
Dry ear precautions - keep right hearing aid out as much as possible for the next week.    Otic gtts to right side    Call for problems or worsening symptoms

## 2018-08-02 NOTE — PROGRESS NOTES
PROCEDURE NOTE    LOCATION:[facility]   PROVIDER:   JENA Knott   PREOPERATIVE DIAGNOSIS: Cerumen to left mastoid bowl  POSTOPERATIVE DIAGNOSIS:Same  PROCEDURE:Cleaning of left mastoid bowl  ANESTHESIA:None   REASON FOR THE PROCEDURE:  The patient verbally consented to intervention after a full discussion of risks, benefits, and alternatives. No guarantees were made or implied.   DETAILS OF THE PROCEDURE:The patient was seated upright in the exam room chair. The open head otoscope was used to visualize the ear canal and tympanic membrane. Cerumen was then removed from the both ears using a forceps, suction

## 2018-08-02 NOTE — PROGRESS NOTES
YOB: 1945  Location: [facility]  Location Address: [address] [place] [address], [facility], [address] [place] [address]  Location Phone: [phone]    Chief Complaint   Patient presents with   • Ear Problem     cerument impaction, right tube may have fallen out       History of Present Illness  Prashanth Cantu is a 73 y.o. male.  Prashanth Cantu is here for follow up of ENT complaints. The patient has had problems with popping and cracking of the ear, otorrhea, a history of ear tube placement and a history of ear surgery  The symptoms are localized to the right side. The patient has had moderate symptoms. The symptoms have been present for the last several weeks The symptoms are aggravated by  no identifiable factors. The symptoms are improved by no identifiable factors.       Past Medical History:   Diagnosis Date   • Cholesteatoma    • Chronic mastoiditis    • Chronic otitis externa    • Eustachian tube dysfunction    • Hearing loss    • Heart disease    • Hx of myringotomy    • Hypertension    • Impacted cerumen    • Mastoid disorder    • Mastoiditis    • Polyp of right middle ear     granulation polyp of righttympanic membrane   • Tympanic membrane perforation        Past Surgical History:   Procedure Laterality Date   • CORONARY ANGIOPLASTY WITH STENT PLACEMENT     • EAR TUBES Right    • SHOULDER SURGERY     • TYMPANOMASTOIDECTOMY  2015    left  [place]       Outpatient Prescriptions Marked as Taking for the 18 encounter (Office Visit) with Charlotte Dunne APRN   Medication Sig Dispense Refill   • aspirin 81 MG chewable tablet Chew 81 mg Daily.     • azelastine (ASTELIN) 0.1 % nasal spray USE TWO SPRAY(S) IN EACH NOSTRIL TWICE DAILY AS DIRECTED 1 each 6   • clopidogrel (PLAVIX) 75 MG tablet Take 75 mg by mouth Daily.     • fluticasone (FLONASE) 50 MCG/ACT nasal spray      • fluticasone (FLONASE) 50 MCG/ACT nasal spray USE TWO SPRAY(S) IN EACH NOSTRIL ONCE DAILY FOR 30 DAYS 1 bottle 3   • fluticasone-salmeterol  (ADVAIR) 100-50 MCG/DOSE DISKUS Inhale 2 (Two) Times a Day.     • gabapentin (NEURONTIN) 300 MG capsule Take 1 capsule by mouth.     • ibuprofen (ADVIL,MOTRIN) 800 MG tablet Take 800 mg by mouth.     • ipratropium (ATROVENT) 0.06 % nasal spray 2 sprays into each nostril 3 (Three) Times a Day As Needed for rhinitis (FOR RUNNY NOSE). 15 mL 11   • lisinopril (PRINIVIL,ZESTRIL) 2.5 MG tablet Take 2.5 mg by mouth Daily.     • lovastatin (MEVACOR) 10 MG tablet Take 10 mg by mouth Every Night.     • meloxicam (MOBIC) 15 MG tablet Take 15 mg by mouth.     • nitroglycerin (NITROSTAT) 0.4 MG SL tablet Place 0.4 mg under the tongue.     • oxyCODONE-acetaminophen (PERCOCET) 7.5-325 MG per tablet      • sildenafil (VIAGRA) 100 MG tablet Take 100 mg by mouth.     • tiZANidine (ZANAFLEX) 4 MG tablet Take 4 mg by mouth.     • [DISCONTINUED] nitroglycerin (NITROSTAT) 0.4 MG SL tablet Place 0.4 mg under the tongue.         Patient has no known allergies.    Family History   Problem Relation Age of Onset   • Cancer Mother        Social History     Social History   • Marital status:      Spouse name: N/A   • Number of children: N/A   • Years of education: N/A     Occupational History   • Not on file.     Social History Main Topics   • Smoking status: Current Every Day Smoker     Packs/day: 1.00   • Smokeless tobacco: Never Used   • Alcohol use No   • Drug use: Unknown   • Sexual activity: Not on file     Other Topics Concern   • Not on file     Social History Narrative   • No narrative on file       Review of Systems   Constitutional: Negative.    HENT:        SEE HPI   Eyes: Negative.    Respiratory: Negative.    Cardiovascular: Negative.    Gastrointestinal: Negative.    Endocrine: Negative.    Genitourinary: Negative.    Musculoskeletal: Negative.    Skin: Negative.    Allergic/Immunologic: Negative.    Neurological: Negative.    Hematological: Negative.    Psychiatric/Behavioral: Negative.        Vitals:    08/02/18 1115    BP: 142/82   Temp: 97.5 °F (36.4 °C)       Body mass index is 28.75 kg/m².    Objective     Physical Exam  CONSTITUTIONAL: well nourished, alert, oriented, in no acute distress     COMMUNICATION AND VOICE: able to communicate normally, normal voice quality    HEAD: normocephalic, no lesions, atraumatic, no tenderness, no masses     FACE: appearance normal, no lesions, no tenderness, no deformities, facial motion symmetric    SALIVARY GLANDS: parotid glands with no tenderness, no swelling, no masses, submandibular glands with normal size, nontender    EYES: ocular motility normal, eyelids normal, orbits normal, no proptosis, conjunctiva normal , pupils equal, round     EARS:  Hearing: response to conversational voice normal bilaterally   External Ears: auricles without lesions  Otoscopic: right TM with intact Ttube with otorrhea suctioned, left TM with mild otorrhea suctioned - left mastoid with moderate squamoceruminous debris removed with forceps and suction    NOSE:  External Nose: structure normal, no tenderness on palpation, no nasal discharge, no lesions, no evidence of trauma, nostrils patent   Intranasal Exam: nasal mucosa normal, vestibule within normal limits, inferior turbinate normal, nasal septum midline     ORAL:  Lips: upper and lower lips without lesion   Teeth: dentition within normal limits for age   Gums: gingivae healthy   Oral Mucosa: oral mucosa normal, no mucosal lesions   Floor of Mouth: Warthin’s duct patent, mucosa normal  Tongue: lingual mucosa normal without lesions, normal tongue mobility   Palate: soft and hard palates with normal mucosa and structure  Oropharynx: oropharyngeal mucosa normal    NECK: neck appearance normal, no mass,  noted without erythema or tenderness    LYMPH NODES: no lymphadenopathy    CHEST/RESPIRATORY: respiratory effort normal    CARDIOVASCULAR: extremities without cyanosis or edema      NEUROLOGIC/PSYCHIATRIC: oriented to time, place and person, mood normal,  affect appropriate, CN II-XII intact grossly    Assessment/Plan   Bill was seen today for ear problem.    Diagnoses and all orders for this visit:    Dysfunction of both eustachian tubes    Otorrhea of right ear    Chronic mucoid otitis media of both ears    History of cholesteatoma    Other orders  -     neomycin-polymyxin-hydrocortisone (CORTISPORIN) 1 % solution otic solution; Administer 3 drops to the right ear 3 (Three) Times a Day.      * Surgery not found *  No orders of the defined types were placed in this encounter.    Return in about 8 weeks (around 9/27/2018).       Patient Instructions   Dry ear precautions - keep right hearing aid out as much as possible for the next week.    Otic gtts to right side    Call for problems or worsening symptoms

## 2018-09-13 ENCOUNTER — OFFICE VISIT (OUTPATIENT)
Dept: OTOLARYNGOLOGY | Facility: CLINIC | Age: 73
End: 2018-09-13

## 2018-09-13 VITALS
TEMPERATURE: 97.9 F | SYSTOLIC BLOOD PRESSURE: 139 MMHG | DIASTOLIC BLOOD PRESSURE: 86 MMHG | WEIGHT: 209.25 LBS | BODY MASS INDEX: 28.34 KG/M2 | HEIGHT: 72 IN | HEART RATE: 78 BPM

## 2018-09-13 DIAGNOSIS — H70.12 CHRONIC MASTOIDITIS, LEFT: ICD-10-CM

## 2018-09-13 DIAGNOSIS — H69.83 DYSFUNCTION OF BOTH EUSTACHIAN TUBES: Primary | ICD-10-CM

## 2018-09-13 DIAGNOSIS — H74.92 MASTOID DISORDER, LEFT: ICD-10-CM

## 2018-09-13 PROCEDURE — 69220 CLEAN OUT MASTOID CAVITY: CPT | Performed by: NURSE PRACTITIONER

## 2018-09-13 PROCEDURE — 99213 OFFICE O/P EST LOW 20 MIN: CPT | Performed by: NURSE PRACTITIONER

## 2018-09-13 NOTE — PROGRESS NOTES
YOB: 1945  Location: [facility]  Location Address: [address] [place] [address], [facility] [address], [address] [place] [address]  Location Phone: [phone]    Chief Complaint   Patient presents with   • Ear Problem       History of Present Illness  Prashanth Cantu is a 73 y.o. male.  Prashanth Cantu is here for follow up of ENT complaints. The patient has had problems with a history of ear tube placement  The symptoms are not localized to a particular location. The patient has had stable symptoms. The symptoms have been present for the last several months The symptoms are aggravated by  no identifiable factors. The symptoms are improved by no identifiable factors.       Past Medical History:   Diagnosis Date   • Cholesteatoma    • Chronic mastoiditis    • Chronic otitis externa    • Chronic otitis media    • Eustachian tube dysfunction    • Hearing loss    • Heart disease    • Hx of myringotomy    • Hypertension    • Impacted cerumen    • Mastoid disorder    • Mastoiditis    • Polyp of right middle ear     granulation polyp of righttympanic membrane   • Tympanic membrane perforation        Past Surgical History:   Procedure Laterality Date   • CORONARY ANGIOPLASTY WITH STENT PLACEMENT     • EAR TUBES Right    • SHOULDER SURGERY     • TYMPANOMASTOIDECTOMY  2015    left  [place]       Outpatient Prescriptions Marked as Taking for the 18 encounter (Office Visit) with Charlotte Dunne APRN   Medication Sig Dispense Refill   • aspirin 81 MG chewable tablet Chew 81 mg Daily.     • azelastine (ASTELIN) 0.1 % nasal spray USE TWO SPRAY(S) IN EACH NOSTRIL TWICE DAILY AS DIRECTED 1 each 6   • clopidogrel (PLAVIX) 75 MG tablet Take 75 mg by mouth Daily.     • fluticasone (FLONASE) 50 MCG/ACT nasal spray      • fluticasone (FLONASE) 50 MCG/ACT nasal spray USE TWO SPRAY(S) IN EACH NOSTRIL ONCE DAILY FOR 30 DAYS 1 bottle 3   • fluticasone-salmeterol (ADVAIR) 100-50 MCG/DOSE DISKUS Inhale 2 (Two) Times a Day.     • gabapentin (NEURONTIN)  300 MG capsule Take 1 capsule by mouth.     • ibuprofen (ADVIL,MOTRIN) 800 MG tablet Take 800 mg by mouth.     • ipratropium (ATROVENT) 0.06 % nasal spray 2 sprays into each nostril 3 (Three) Times a Day As Needed for rhinitis (FOR RUNNY NOSE). 15 mL 11   • lisinopril (PRINIVIL,ZESTRIL) 2.5 MG tablet Take 2.5 mg by mouth Daily.     • lovastatin (MEVACOR) 10 MG tablet Take 10 mg by mouth Every Night.     • meloxicam (MOBIC) 15 MG tablet Take 15 mg by mouth.     • nitroglycerin (NITROSTAT) 0.4 MG SL tablet Place 0.4 mg under the tongue.     • oxyCODONE-acetaminophen (PERCOCET) 7.5-325 MG per tablet      • sildenafil (VIAGRA) 100 MG tablet Take 100 mg by mouth.     • tiZANidine (ZANAFLEX) 4 MG tablet Take 4 mg by mouth.     • [DISCONTINUED] neomycin-polymyxin-hydrocortisone (CORTISPORIN) 1 % solution otic solution Administer 3 drops to the right ear 3 (Three) Times a Day. 10 mL 2       Patient has no known allergies.    Family History   Problem Relation Age of Onset   • Cancer Mother        Social History     Social History   • Marital status:      Spouse name: N/A   • Number of children: N/A   • Years of education: N/A     Occupational History   • Not on file.     Social History Main Topics   • Smoking status: Current Every Day Smoker     Packs/day: 1.00   • Smokeless tobacco: Never Used   • Alcohol use No   • Drug use: Unknown   • Sexual activity: Not on file     Other Topics Concern   • Not on file     Social History Narrative   • No narrative on file       Review of Systems   Constitutional: Negative.    HENT:        SEE HPI   Eyes: Negative.    Respiratory: Negative.    Cardiovascular: Negative.    Gastrointestinal: Negative.    Endocrine: Negative.    Genitourinary: Negative.    Musculoskeletal: Negative.    Skin: Negative.    Allergic/Immunologic: Negative.    Neurological: Negative.    Hematological: Negative.    Psychiatric/Behavioral: Negative.        Vitals:    09/13/18 0932   BP: 139/86   Pulse: 78    Temp: 97.9 °F (36.6 °C)       Body mass index is 28.38 kg/m².    Objective     Physical Exam  CONSTITUTIONAL: well nourished, alert, oriented, in no acute distress     COMMUNICATION AND VOICE: able to communicate normally, normal voice quality    HEAD: normocephalic, no lesions, atraumatic, no tenderness, no masses     FACE: appearance normal, no lesions, no tenderness, no deformities, facial motion symmetric    SALIVARY GLANDS: parotid glands with no tenderness, no swelling, no masses, submandibular glands with normal size, nontender    EYES: ocular motility normal, eyelids normal, orbits normal, no proptosis, conjunctiva normal , pupils equal, round     EARS:  Hearing: response to conversational voice normal bilaterally   External Ears: auricles without lesions  Otoscopic: right TM with intact and patent Ttube, mild cerumen removed with suction, left TM with granulation with canal wall up mastoid with moderate squamoceruminous debris removed with suction    NOSE:  External Nose: structure normal, no tenderness on palpation, no nasal discharge, no lesions, no evidence of trauma, nostrils patent   Intranasal Exam: nasal mucosa normal, vestibule within normal limits, inferior turbinate normal, nasal septum midline     ORAL:  Lips: upper and lower lips without lesion   Teeth: dentition within normal limits for age   Gums: gingivae healthy   Oral Mucosa: oral mucosa normal, no mucosal lesions   Floor of Mouth: Warthin’s duct patent, mucosa normal  Tongue: lingual mucosa normal without lesions, normal tongue mobility   Palate: soft and hard palates with normal mucosa and structure  Oropharynx: oropharyngeal mucosa normal    NECK: neck appearance normal, no mass,  noted without erythema or tenderness    THYROID: no overt thyromegaly, no tenderness, nodules or mass present on palpation, position midline     LYMPH NODES: no lymphadenopathy    CHEST/RESPIRATORY: respiratory effort normal,     CARDIOVASCULAR: extremities  without cyanosis or edema      NEUROLOGIC/PSYCHIATRIC: oriented to time, place and person, mood normal, affect appropriate, CN II-XII intact grossly    Assessment/Plan   Bill was seen today for ear problem.    Diagnoses and all orders for this visit:    Dysfunction of both eustachian tubes    Mastoid disorder, left    Chronic mastoiditis, left      * Surgery not found *  No orders of the defined types were placed in this encounter.    Return in about 6 months (around 3/13/2019).       Patient Instructions   Call for problems or worsening symptoms    Dry ear precautions

## 2018-12-26 ENCOUNTER — OFFICE VISIT (OUTPATIENT)
Dept: INTERNAL MEDICINE | Age: 73
End: 2018-12-26
Payer: MEDICARE

## 2018-12-26 VITALS
OXYGEN SATURATION: 97 % | DIASTOLIC BLOOD PRESSURE: 78 MMHG | WEIGHT: 211 LBS | SYSTOLIC BLOOD PRESSURE: 112 MMHG | HEIGHT: 72 IN | BODY MASS INDEX: 28.58 KG/M2 | HEART RATE: 65 BPM | RESPIRATION RATE: 16 BRPM

## 2018-12-26 DIAGNOSIS — R41.9 COGNITIVE COMPLAINTS: ICD-10-CM

## 2018-12-26 DIAGNOSIS — I10 ESSENTIAL HYPERTENSION: ICD-10-CM

## 2018-12-26 DIAGNOSIS — I10 ESSENTIAL HYPERTENSION: Primary | ICD-10-CM

## 2018-12-26 DIAGNOSIS — N42.9 DISORDER OF PROSTATE: ICD-10-CM

## 2018-12-26 DIAGNOSIS — M89.9 DISORDER OF BONE: ICD-10-CM

## 2018-12-26 DIAGNOSIS — E78.00 PURE HYPERCHOLESTEROLEMIA: ICD-10-CM

## 2018-12-26 DIAGNOSIS — G89.4 CHRONIC PAIN SYNDROME: ICD-10-CM

## 2018-12-26 DIAGNOSIS — I25.10 CORONARY ARTERY DISEASE INVOLVING NATIVE CORONARY ARTERY OF NATIVE HEART WITHOUT ANGINA PECTORIS: ICD-10-CM

## 2018-12-26 DIAGNOSIS — Z78.9 HEALTH MAINTENANCE ALTERATION: ICD-10-CM

## 2018-12-26 LAB
ALBUMIN SERPL-MCNC: 4.3 G/DL (ref 3.5–5.2)
ALP BLD-CCNC: 64 U/L (ref 40–130)
ALT SERPL-CCNC: 17 U/L (ref 5–41)
ANION GAP SERPL CALCULATED.3IONS-SCNC: 15 MMOL/L (ref 7–19)
AST SERPL-CCNC: 16 U/L (ref 5–40)
BILIRUB SERPL-MCNC: 1.3 MG/DL (ref 0.2–1.2)
BUN BLDV-MCNC: 19 MG/DL (ref 8–23)
CALCIUM SERPL-MCNC: 9.7 MG/DL (ref 8.8–10.2)
CHLORIDE BLD-SCNC: 108 MMOL/L (ref 98–111)
CHOLESTEROL, TOTAL: 121 MG/DL (ref 160–199)
CO2: 24 MMOL/L (ref 22–29)
CREAT SERPL-MCNC: 0.9 MG/DL (ref 0.5–1.2)
GFR NON-AFRICAN AMERICAN: >60
GLUCOSE BLD-MCNC: 96 MG/DL (ref 74–109)
HCT VFR BLD CALC: 45 % (ref 42–52)
HDLC SERPL-MCNC: 39 MG/DL (ref 55–121)
HEMOGLOBIN: 14.6 G/DL (ref 14–18)
LDL CHOLESTEROL CALCULATED: 55 MG/DL
MCH RBC QN AUTO: 30.5 PG (ref 27–31)
MCHC RBC AUTO-ENTMCNC: 32.4 G/DL (ref 33–37)
MCV RBC AUTO: 94.1 FL (ref 80–94)
PDW BLD-RTO: 12.8 % (ref 11.5–14.5)
PLATELET # BLD: 199 K/UL (ref 130–400)
PMV BLD AUTO: 9.8 FL (ref 9.4–12.4)
POTASSIUM SERPL-SCNC: 4.3 MMOL/L (ref 3.5–5)
PROSTATE SPECIFIC ANTIGEN: 1.49 NG/ML (ref 0–4)
RBC # BLD: 4.78 M/UL (ref 4.7–6.1)
SODIUM BLD-SCNC: 147 MMOL/L (ref 136–145)
TOTAL PROTEIN: 7.1 G/DL (ref 6.6–8.7)
TRIGL SERPL-MCNC: 137 MG/DL (ref 0–149)
WBC # BLD: 6.2 K/UL (ref 4.8–10.8)

## 2018-12-26 PROCEDURE — 99214 OFFICE O/P EST MOD 30 MIN: CPT | Performed by: NURSE PRACTITIONER

## 2018-12-26 PROCEDURE — 4004F PT TOBACCO SCREEN RCVD TLK: CPT | Performed by: NURSE PRACTITIONER

## 2018-12-26 PROCEDURE — 4040F PNEUMOC VAC/ADMIN/RCVD: CPT | Performed by: NURSE PRACTITIONER

## 2018-12-26 PROCEDURE — 3017F COLORECTAL CA SCREEN DOC REV: CPT | Performed by: NURSE PRACTITIONER

## 2018-12-26 PROCEDURE — G8484 FLU IMMUNIZE NO ADMIN: HCPCS | Performed by: NURSE PRACTITIONER

## 2018-12-26 PROCEDURE — 1101F PT FALLS ASSESS-DOCD LE1/YR: CPT | Performed by: NURSE PRACTITIONER

## 2018-12-26 PROCEDURE — 1123F ACP DISCUSS/DSCN MKR DOCD: CPT | Performed by: NURSE PRACTITIONER

## 2018-12-26 PROCEDURE — G8427 DOCREV CUR MEDS BY ELIG CLIN: HCPCS | Performed by: NURSE PRACTITIONER

## 2018-12-26 PROCEDURE — G8419 CALC BMI OUT NRM PARAM NOF/U: HCPCS | Performed by: NURSE PRACTITIONER

## 2018-12-26 PROCEDURE — G8598 ASA/ANTIPLAT THER USED: HCPCS | Performed by: NURSE PRACTITIONER

## 2018-12-26 ASSESSMENT — PATIENT HEALTH QUESTIONNAIRE - PHQ9
SUM OF ALL RESPONSES TO PHQ9 QUESTIONS 1 & 2: 0
2. FEELING DOWN, DEPRESSED OR HOPELESS: 0
SUM OF ALL RESPONSES TO PHQ QUESTIONS 1-9: 0
1. LITTLE INTEREST OR PLEASURE IN DOING THINGS: 0
SUM OF ALL RESPONSES TO PHQ QUESTIONS 1-9: 0

## 2018-12-26 ASSESSMENT — ENCOUNTER SYMPTOMS
STRIDOR: 0
SHORTNESS OF BREATH: 0
CHOKING: 0
SORE THROAT: 0
ABDOMINAL DISTENTION: 0
TROUBLE SWALLOWING: 0
EYE DISCHARGE: 0
VOMITING: 0
WHEEZING: 0
ABDOMINAL PAIN: 0
COLOR CHANGE: 0
EYE ITCHING: 0
DIARRHEA: 0
NAUSEA: 0
CONSTIPATION: 0
COUGH: 0
BLOOD IN STOOL: 0

## 2018-12-26 NOTE — PROGRESS NOTES
polydipsia and polyuria. Genitourinary: Negative for difficulty urinating, dysuria, frequency and urgency. Musculoskeletal: Negative for arthralgias and gait problem. Skin: Negative for color change and rash. Allergic/Immunologic: Negative for food allergies and immunocompromised state. Neurological: Negative for dizziness, tremors, syncope, speech difficulty, weakness and headaches. Cognitive issues    Hematological: Negative for adenopathy. Does not bruise/bleed easily. Psychiatric/Behavioral: Negative for confusion and hallucinations. Objective:     Physical Exam   Constitutional: He is oriented to person, place, and time. He appears well-developed and well-nourished. No distress. HENT:   Head: Normocephalic and atraumatic. Eyes: Pupils are equal, round, and reactive to light. Right eye exhibits no discharge. Left eye exhibits no discharge. No scleral icterus. Neck: Normal range of motion. Neck supple. No JVD present. No thyromegaly present. Cardiovascular: Normal rate, regular rhythm and normal heart sounds. No murmur heard. Pulmonary/Chest: Effort normal and breath sounds normal. No respiratory distress. He has no wheezes. He has no rales. Abdominal: Soft. Bowel sounds are normal. He exhibits no distension and no mass. There is no tenderness. There is no rebound and no guarding. Musculoskeletal: Normal range of motion. He exhibits no edema or tenderness. Neurological: He is alert and oriented to person, place, and time. He has normal reflexes. He displays normal reflexes. No cranial nerve deficit. Coordination normal.   Skin: Skin is warm and dry. No rash noted. No erythema. Psychiatric: His behavior is normal. Judgment and thought content normal. He does not exhibit a depressed mood. /78   Pulse 65   Resp 16   Ht 6' (1.829 m)   Wt 211 lb (95.7 kg)   SpO2 97%   BMI 28.62 kg/m²     Assessment:       Diagnosis Orders   1. Essential hypertension     2.

## 2019-01-10 ENCOUNTER — OFFICE VISIT (OUTPATIENT)
Dept: GASTROENTEROLOGY | Facility: CLINIC | Age: 74
End: 2019-01-10

## 2019-01-10 VITALS
SYSTOLIC BLOOD PRESSURE: 128 MMHG | BODY MASS INDEX: 28.04 KG/M2 | WEIGHT: 207 LBS | DIASTOLIC BLOOD PRESSURE: 80 MMHG | HEART RATE: 76 BPM | HEIGHT: 72 IN | OXYGEN SATURATION: 98 %

## 2019-01-10 DIAGNOSIS — I10 HTN (HYPERTENSION), BENIGN: ICD-10-CM

## 2019-01-10 DIAGNOSIS — Z86.010 HX OF ADENOMATOUS COLONIC POLYPS: Primary | ICD-10-CM

## 2019-01-10 DIAGNOSIS — Z79.02 PLATELET INHIBITION DUE TO PLAVIX: ICD-10-CM

## 2019-01-10 PROCEDURE — S0260 H&P FOR SURGERY: HCPCS | Performed by: CLINICAL NURSE SPECIALIST

## 2019-01-10 RX ORDER — SODIUM, POTASSIUM,MAG SULFATES 17.5-3.13G
SOLUTION, RECONSTITUTED, ORAL ORAL
Qty: 2 BOTTLE | Refills: 0 | Status: ON HOLD | OUTPATIENT
Start: 2019-01-10 | End: 2019-01-22

## 2019-01-10 NOTE — H&P (VIEW-ONLY)
Prashanth Cantu  1945      1/10/2019  Chief Complaint   Patient presents with   • Colonoscopy     Subjective   HPI  Prashanth Cantu is a 73 y.o. male who presents as a referral for preventative maintenance. He has no complaints of nausea or vomiting. No change in bowels. No wt loss. No BRBPR. No melena. There is NO family hx for colon cancer. No abdominal pain.  Past Medical History:   Diagnosis Date   • Cholesteatoma    • Chronic mastoiditis    • Chronic otitis externa    • Chronic otitis media    • Eustachian tube dysfunction    • Hearing loss    • Heart disease    • Hx of colonic polyp    • Hx of myringotomy    • Hypertension    • Impacted cerumen    • Mastoid disorder    • Mastoiditis    • Polyp of right middle ear     granulation polyp of righttympanic membrane   • Tympanic membrane perforation      Past Surgical History:   Procedure Laterality Date   • COLONOSCOPY W/ POLYPECTOMY  12/30/2013    Tubular adenomatous polyp at 20 cm repeat exam in 5 years   • CORONARY ANGIOPLASTY WITH STENT PLACEMENT     • EAR TUBES Right    • SHOULDER SURGERY     • TYMPANOMASTOIDECTOMY  02/2015    left  [facility]     Outpatient Medications Marked as Taking for the 1/10/19 encounter (Office Visit) with Jessica Duckworth APRN   Medication Sig Dispense Refill   • aspirin 81 MG chewable tablet Chew 81 mg Daily.     • azelastine (ASTELIN) 0.1 % nasal spray USE TWO SPRAY(S) IN EACH NOSTRIL TWICE DAILY AS DIRECTED 1 each 6   • clopidogrel (PLAVIX) 75 MG tablet Take 75 mg by mouth Daily.     • fluticasone (FLONASE) 50 MCG/ACT nasal spray      • fluticasone (FLONASE) 50 MCG/ACT nasal spray USE TWO SPRAY(S) IN EACH NOSTRIL ONCE DAILY FOR 30 DAYS 1 bottle 3   • fluticasone-salmeterol (ADVAIR) 100-50 MCG/DOSE DISKUS Inhale 2 (Two) Times a Day.     • gabapentin (NEURONTIN) 300 MG capsule Take 1 capsule by mouth.     • ibuprofen (ADVIL,MOTRIN) 800 MG tablet Take 800 mg by mouth.     • ipratropium (ATROVENT) 0.06 % nasal spray 2 sprays into  each nostril 3 (Three) Times a Day As Needed for rhinitis (FOR RUNNY NOSE). 15 mL 11   • lisinopril (PRINIVIL,ZESTRIL) 2.5 MG tablet Take 2.5 mg by mouth Daily.     • lovastatin (MEVACOR) 10 MG tablet Take 10 mg by mouth Every Night.     • meloxicam (MOBIC) 15 MG tablet Take 15 mg by mouth.     • nitroglycerin (NITROSTAT) 0.4 MG SL tablet Place 0.4 mg under the tongue.     • oxyCODONE-acetaminophen (PERCOCET) 7.5-325 MG per tablet      • sildenafil (VIAGRA) 100 MG tablet Take 100 mg by mouth.     • tiZANidine (ZANAFLEX) 4 MG tablet Take 4 mg by mouth.       No Known Allergies  Social History     Socioeconomic History   • Marital status:      Spouse name: Not on file   • Number of children: Not on file   • Years of education: Not on file   • Highest education level: Not on file   Social Needs   • Financial resource strain: Not on file   • Food insecurity - worry: Not on file   • Food insecurity - inability: Not on file   • Transportation needs - medical: Not on file   • Transportation needs - non-medical: Not on file   Occupational History   • Not on file   Tobacco Use   • Smoking status: Current Every Day Smoker     Packs/day: 1.00     Types: Cigarettes   • Smokeless tobacco: Never Used   Substance and Sexual Activity   • Alcohol use: No   • Drug use: Defer   • Sexual activity: Not on file   Other Topics Concern   • Not on file   Social History Narrative   • Not on file     Family History   Problem Relation Age of Onset   • Cancer Mother    • Colon cancer Neg Hx    • Colon polyps Neg Hx      Health Maintenance   Topic Date Due   • HEPATITIS A VACCINE ADULT (1 of 2) 04/03/1962   • TDAP/TD VACCINES (1 - Tdap) 04/03/1963   • ZOSTER VACCINE (1 of 2) 04/03/1994   • MEDICARE ANNUAL WELLNESS  05/26/2017   • INFLUENZA VACCINE  08/01/2018   • COLONOSCOPY  12/30/2023   • PNEUMOCOCCAL VACCINES (65+ LOW/MEDIUM RISK)  Completed       REVIEW OF SYSTEMS  General: well appearing, no fever chills or sweats, no unexplained wt  "loss  HEENT: no acute visual or hearing disturbances  Cardiovascular: No chest pain or palpitations  Pulmonary: No shortness of breath, coughing, wheezing or hemoptysis  : No burning, urgency, hematuria, or dysuria  Musculoskeletal: No joint pain or stiffness  Peripheral: no edema  Skin: No lesions or rashes  Neuro: No dizziness, headaches, stroke, syncope  Endocrine: No hot or cold intolerances  Hematological: No blood dyscrasias    Objective   Vitals:    01/10/19 0838   BP: 128/80   Pulse: 76   SpO2: 98%   Weight: 93.9 kg (207 lb)   Height: 182.9 cm (72\")     Body mass index is 28.07 kg/m².  Patient's Body mass index is 28.07 kg/m². BMI is above normal parameters. Recommendations include: nutrition counseling.      PHYSICAL EXAM  General: age appropriate well nourished well appearing, no acute distress  Head: normocephalic and atraumatic  Global assessment-supple  Neck-No JVD noted, no lymphadenopathy  Pulmonary-clear to auscultation bilaterally, normal respiratory effort  Cardiovascular-normal rate and rhythm, normal heart sounds, S1 and S2 noted  Abdomen-soft, non tender, non distended, normal bowel sounds all 4 quadrants, no hepatosplenomegaly noted  Extremities-No clubbing cyanosis or edema  Neuro-Non focal, converses appropriately, awake, alert, oriented    Assessment/Plan     Bill was seen today for colonoscopy.    Diagnoses and all orders for this visit:    Hx of adenomatous colonic polyps  -     Case Request; Standing  -     Follow Anesthesia Guidelines / Standing Orders; Future  -     Implement Anesthesia Orders Day of Procedure; Standing  -     Obtain Informed Consent; Standing  -     Verify bowel prep was successful; Standing  -     Case Request  -     SUPREP BOWEL PREP KIT 17.5-3.13-1.6 GM/177ML solution oral solution; Take as directed by office instructions provided    Platelet inhibition due to Plavix  Comments:  To hold per dr Tony Spence/Eloisa Maurer NP he takes due to hx of CAD with stent " placement years ago.    HTN (hypertension), benign  Comments:  cont BP medication the am of procedure        COLONOSCOPY WITH ANESTHESIA (N/A)  Body mass index is 28.07 kg/m².    Patient instructions on prep prior to procedure provided to the patient.    All risks, benefits, alternatives, and indications of colonoscopy procedure have been discussed with the patient. Risks to include perforation of the colon requiring possible surgery or colostomy, risk of bleeding from biopsies or removal of colon tissue, possibility of missing a colon polyp or cancer, or adverse drug reaction.  Benefits to include the diagnosis and management of disease of the colon and rectum. Alternatives to include barium enema, radiographic evaluation, lab testing or no intervention. Pt verbalizes understanding and agrees.     Jessica Duckworth, APRN  1/10/2019  8:52 AM      IF YOU SMOKE OR USE TOBACCO PLEASE READ THE FOLLOWIN minutes reading provided    Why is smoking bad for me?  Smoking increases the risk of heart disease, lung disease, vascular disease, stroke, and cancer.     If you smoke, STOP!    If you would like more information on quitting smoking, please visit the [employer] website: [email or web address]   This link will provide additional resources including the QUIT line and the Beat the Pack support groups.     For more information:    Quit Now [place]  -QUIT-NOW  [email or web address]/en-US/    Obesity, Adult  Obesity is the condition of having too much total body fat. Being overweight or obese means that your weight is greater than what is considered healthy for your body size. Obesity is determined by a measurement called BMI. BMI is an estimate of body fat and is calculated from height and weight. For adults, a BMI of 30 or higher is considered obese.  Obesity can eventually lead to other health concerns and major illnesses, including:  · Stroke.  · Coronary  artery disease (CAD).  · Type 2 diabetes.  · Some types of cancer, including cancers of the colon, breast, uterus, and gallbladder.  · Osteoarthritis.  · High blood pressure (hypertension).  · High cholesterol.  · Sleep apnea.  · Gallbladder stones.  · Infertility problems.  What are the causes?  The main cause of obesity is taking in (consuming) more calories than your body uses for energy. Other factors that contribute to this condition may include:  · Being born with genes that make you more likely to become obese.  · Having a medical condition that causes obesity. These conditions include:  ¨ Hypothyroidism.  ¨ Polycystic ovarian syndrome (PCOS).  ¨ Binge-eating disorder.  ¨ Cushing syndrome.  · Taking certain medicines, such as steroids, antidepressants, and seizure medicines.  · Not being physically active (sedentary lifestyle).  · Living where there are limited places to exercise safely or buy healthy foods.  · Not getting enough sleep.  What increases the risk?  The following factors may increase your risk of this condition:  · Having a family history of obesity.  · Being a woman of -American descent.  · Being a man of  descent.  What are the signs or symptoms?  Having excessive body fat is the main symptom of this condition.  How is this diagnosed?  This condition may be diagnosed based on:  · Your symptoms.  · Your medical history.  · A physical exam. Your health care provider may measure:  ¨ Your BMI. If you are an adult with a BMI between 25 and less than 30, you are considered overweight. If you are an adult with a BMI of 30 or higher, you are considered obese.  ¨ The distances around your hips and your waist (circumferences). These may be compared to each other to help diagnose your condition.  ¨ Your skinfold thickness. Your health care provider may gently pinch a fold of your skin and measure it.  How is this treated?  Treatment for this condition often includes changing your  lifestyle. Treatment may include some or all of the following:  · Dietary changes. Work with your health care provider and a dietitian to set a weight-loss goal that is healthy and reasonable for you. Dietary changes may include eating:  ¨ Smaller portions. A portion size is the amount of a particular food that is healthy for you to eat at one time. This varies from person to person.  ¨ Low-calorie or low-fat options.  ¨ More whole grains, fruits, and vegetables.  · Regular physical activity. This may include aerobic activity (cardio) and strength training.  · Medicine to help you lose weight. Your health care provider may prescribe medicine if you are unable to lose 1 pound a week after 6 weeks of eating more healthily and doing more physical activity.  · Surgery. Surgical options may include gastric banding and gastric bypass. Surgery may be done if:  ¨ Other treatments have not helped to improve your condition.  ¨ You have a BMI of 40 or higher.  ¨ You have life-threatening health problems related to obesity.  Follow these instructions at home:     Eating and drinking     · Follow recommendations from your health care provider about what you eat and drink. Your health care provider may advise you to:  ¨ Limit fast foods, sweets, and processed snack foods.  ¨ Choose low-fat options, such as low-fat milk instead of whole milk.  ¨ Eat 5 or more servings of fruits or vegetables every day.  ¨ Eat at home more often. This gives you more control over what you eat.  ¨ Choose healthy foods when you eat out.  ¨ Learn what a healthy portion size is.  ¨ Keep low-fat snacks on hand.  ¨ Avoid sugary drinks, such as soda, fruit juice, iced tea sweetened with sugar, and flavored milk.  ¨ Eat a healthy breakfast.  · Drink enough water to keep your urine clear or pale yellow.  · Do not go without eating for long periods of time (do not fast) or follow a fad diet. Fasting and fad diets can be unhealthy and even dangerous.  Physical  Activity   · Exercise regularly, as told by your health care provider. Ask your health care provider what types of exercise are safe for you and how often you should exercise.  · Warm up and stretch before being active.  · Cool down and stretch after being active.  · Rest between periods of activity.  Lifestyle   · Limit the time that you spend in front of your TV, computer, or video game system.  · Find ways to reward yourself that do not involve food.  · Limit alcohol intake to no more than 1 drink a day for nonpregnant women and 2 drinks a day for men. One drink equals 12 oz of beer, 5 oz of wine, or 1½ oz of hard liquor.  General instructions   · Keep a weight loss journal to keep track of the food you eat and how much you exercise you get.  · Take over-the-counter and prescription medicines only as told by your health care provider.  · Take vitamins and supplements only as told by your health care provider.  · Consider joining a support group. Your health care provider may be able to recommend a support group.  · Keep all follow-up visits as told by your health care provider. This is important.  Contact a health care provider if:  · You are unable to meet your weight loss goal after 6 weeks of dietary and lifestyle changes.  This information is not intended to replace advice given to you by your health care provider. Make sure you discuss any questions you have with your health care provider.  Document Released: 01/25/2006 Document Revised: 05/22/2017 Document Reviewed: 10/05/2016  [employer] Patient Education © 2017 [employer]

## 2019-01-10 NOTE — PROGRESS NOTES
Prashanth Cantu  1945      1/10/2019  Chief Complaint   Patient presents with   • Colonoscopy     Subjective   HPI  Prashanth Cantu is a 73 y.o. male who presents as a referral for preventative maintenance. He has no complaints of nausea or vomiting. No change in bowels. No wt loss. No BRBPR. No melena. There is NO family hx for colon cancer. No abdominal pain.  Past Medical History:   Diagnosis Date   • Cholesteatoma    • Chronic mastoiditis    • Chronic otitis externa    • Chronic otitis media    • Eustachian tube dysfunction    • Hearing loss    • Heart disease    • Hx of colonic polyp    • Hx of myringotomy    • Hypertension    • Impacted cerumen    • Mastoid disorder    • Mastoiditis    • Polyp of right middle ear     granulation polyp of righttympanic membrane   • Tympanic membrane perforation      Past Surgical History:   Procedure Laterality Date   • COLONOSCOPY W/ POLYPECTOMY  12/30/2013    Tubular adenomatous polyp at 20 cm repeat exam in 5 years   • CORONARY ANGIOPLASTY WITH STENT PLACEMENT     • EAR TUBES Right    • SHOULDER SURGERY     • TYMPANOMASTOIDECTOMY  02/2015    left  Acevedo     Outpatient Medications Marked as Taking for the 1/10/19 encounter (Office Visit) with Jessica Duckworth APRN   Medication Sig Dispense Refill   • aspirin 81 MG chewable tablet Chew 81 mg Daily.     • azelastine (ASTELIN) 0.1 % nasal spray USE TWO SPRAY(S) IN EACH NOSTRIL TWICE DAILY AS DIRECTED 1 each 6   • clopidogrel (PLAVIX) 75 MG tablet Take 75 mg by mouth Daily.     • fluticasone (FLONASE) 50 MCG/ACT nasal spray      • fluticasone (FLONASE) 50 MCG/ACT nasal spray USE TWO SPRAY(S) IN EACH NOSTRIL ONCE DAILY FOR 30 DAYS 1 bottle 3   • fluticasone-salmeterol (ADVAIR) 100-50 MCG/DOSE DISKUS Inhale 2 (Two) Times a Day.     • gabapentin (NEURONTIN) 300 MG capsule Take 1 capsule by mouth.     • ibuprofen (ADVIL,MOTRIN) 800 MG tablet Take 800 mg by mouth.     • ipratropium (ATROVENT) 0.06 % nasal spray 2 sprays into  each nostril 3 (Three) Times a Day As Needed for rhinitis (FOR RUNNY NOSE). 15 mL 11   • lisinopril (PRINIVIL,ZESTRIL) 2.5 MG tablet Take 2.5 mg by mouth Daily.     • lovastatin (MEVACOR) 10 MG tablet Take 10 mg by mouth Every Night.     • meloxicam (MOBIC) 15 MG tablet Take 15 mg by mouth.     • nitroglycerin (NITROSTAT) 0.4 MG SL tablet Place 0.4 mg under the tongue.     • oxyCODONE-acetaminophen (PERCOCET) 7.5-325 MG per tablet      • sildenafil (VIAGRA) 100 MG tablet Take 100 mg by mouth.     • tiZANidine (ZANAFLEX) 4 MG tablet Take 4 mg by mouth.       No Known Allergies  Social History     Socioeconomic History   • Marital status:      Spouse name: Not on file   • Number of children: Not on file   • Years of education: Not on file   • Highest education level: Not on file   Social Needs   • Financial resource strain: Not on file   • Food insecurity - worry: Not on file   • Food insecurity - inability: Not on file   • Transportation needs - medical: Not on file   • Transportation needs - non-medical: Not on file   Occupational History   • Not on file   Tobacco Use   • Smoking status: Current Every Day Smoker     Packs/day: 1.00     Types: Cigarettes   • Smokeless tobacco: Never Used   Substance and Sexual Activity   • Alcohol use: No   • Drug use: Defer   • Sexual activity: Not on file   Other Topics Concern   • Not on file   Social History Narrative   • Not on file     Family History   Problem Relation Age of Onset   • Cancer Mother    • Colon cancer Neg Hx    • Colon polyps Neg Hx      Health Maintenance   Topic Date Due   • HEPATITIS A VACCINE ADULT (1 of 2) 04/03/1962   • TDAP/TD VACCINES (1 - Tdap) 04/03/1963   • ZOSTER VACCINE (1 of 2) 04/03/1994   • MEDICARE ANNUAL WELLNESS  05/26/2017   • INFLUENZA VACCINE  08/01/2018   • COLONOSCOPY  12/30/2023   • PNEUMOCOCCAL VACCINES (65+ LOW/MEDIUM RISK)  Completed       REVIEW OF SYSTEMS  General: well appearing, no fever chills or sweats, no unexplained wt  "loss  HEENT: no acute visual or hearing disturbances  Cardiovascular: No chest pain or palpitations  Pulmonary: No shortness of breath, coughing, wheezing or hemoptysis  : No burning, urgency, hematuria, or dysuria  Musculoskeletal: No joint pain or stiffness  Peripheral: no edema  Skin: No lesions or rashes  Neuro: No dizziness, headaches, stroke, syncope  Endocrine: No hot or cold intolerances  Hematological: No blood dyscrasias    Objective   Vitals:    01/10/19 0838   BP: 128/80   Pulse: 76   SpO2: 98%   Weight: 93.9 kg (207 lb)   Height: 182.9 cm (72\")     Body mass index is 28.07 kg/m².  Patient's Body mass index is 28.07 kg/m². BMI is above normal parameters. Recommendations include: nutrition counseling.      PHYSICAL EXAM  General: age appropriate well nourished well appearing, no acute distress  Head: normocephalic and atraumatic  Global assessment-supple  Neck-No JVD noted, no lymphadenopathy  Pulmonary-clear to auscultation bilaterally, normal respiratory effort  Cardiovascular-normal rate and rhythm, normal heart sounds, S1 and S2 noted  Abdomen-soft, non tender, non distended, normal bowel sounds all 4 quadrants, no hepatosplenomegaly noted  Extremities-No clubbing cyanosis or edema  Neuro-Non focal, converses appropriately, awake, alert, oriented    Assessment/Plan     Bill was seen today for colonoscopy.    Diagnoses and all orders for this visit:    Hx of adenomatous colonic polyps  -     Case Request; Standing  -     Follow Anesthesia Guidelines / Standing Orders; Future  -     Implement Anesthesia Orders Day of Procedure; Standing  -     Obtain Informed Consent; Standing  -     Verify bowel prep was successful; Standing  -     Case Request  -     SUPREP BOWEL PREP KIT 17.5-3.13-1.6 GM/177ML solution oral solution; Take as directed by office instructions provided    Platelet inhibition due to Plavix  Comments:  To hold per dr Tony Spence/Eloisa Maurer NP he takes due to hx of CAD with stent " placement years ago.    HTN (hypertension), benign  Comments:  cont BP medication the am of procedure        COLONOSCOPY WITH ANESTHESIA (N/A)  Body mass index is 28.07 kg/m².    Patient instructions on prep prior to procedure provided to the patient.    All risks, benefits, alternatives, and indications of colonoscopy procedure have been discussed with the patient. Risks to include perforation of the colon requiring possible surgery or colostomy, risk of bleeding from biopsies or removal of colon tissue, possibility of missing a colon polyp or cancer, or adverse drug reaction.  Benefits to include the diagnosis and management of disease of the colon and rectum. Alternatives to include barium enema, radiographic evaluation, lab testing or no intervention. Pt verbalizes understanding and agrees.     Jessica Duckworth, APRN  1/10/2019  8:52 AM      IF YOU SMOKE OR USE TOBACCO PLEASE READ THE FOLLOWIN minutes reading provided    Why is smoking bad for me?  Smoking increases the risk of heart disease, lung disease, vascular disease, stroke, and cancer.     If you smoke, STOP!    If you would like more information on quitting smoking, please visit the [employer] website: [email or web address]   This link will provide additional resources including the QUIT line and the Beat the Pack support groups.     For more information:    Quit Now [place]  -QUIT-NOW  https://[place].[employer].org/en-US/    Obesity, Adult  Obesity is the condition of having too much total body fat. Being overweight or obese means that your weight is greater than what is considered healthy for your body size. Obesity is determined by a measurement called BMI. BMI is an estimate of body fat and is calculated from height and weight. For adults, a BMI of 30 or higher is considered obese.  Obesity can eventually lead to other health concerns and major illnesses, including:  · Stroke.  · Coronary  artery disease (CAD).  · Type 2 diabetes.  · Some types of cancer, including cancers of the colon, breast, uterus, and gallbladder.  · Osteoarthritis.  · High blood pressure (hypertension).  · High cholesterol.  · Sleep apnea.  · Gallbladder stones.  · Infertility problems.  What are the causes?  The main cause of obesity is taking in (consuming) more calories than your body uses for energy. Other factors that contribute to this condition may include:  · Being born with genes that make you more likely to become obese.  · Having a medical condition that causes obesity. These conditions include:  ¨ Hypothyroidism.  ¨ Polycystic ovarian syndrome (PCOS).  ¨ Binge-eating disorder.  ¨ Cushing syndrome.  · Taking certain medicines, such as steroids, antidepressants, and seizure medicines.  · Not being physically active (sedentary lifestyle).  · Living where there are limited places to exercise safely or buy healthy foods.  · Not getting enough sleep.  What increases the risk?  The following factors may increase your risk of this condition:  · Having a family history of obesity.  · Being a woman of -American descent.  · Being a man of  descent.  What are the signs or symptoms?  Having excessive body fat is the main symptom of this condition.  How is this diagnosed?  This condition may be diagnosed based on:  · Your symptoms.  · Your medical history.  · A physical exam. Your health care provider may measure:  ¨ Your BMI. If you are an adult with a BMI between 25 and less than 30, you are considered overweight. If you are an adult with a BMI of 30 or higher, you are considered obese.  ¨ The distances around your hips and your waist (circumferences). These may be compared to each other to help diagnose your condition.  ¨ Your skinfold thickness. Your health care provider may gently pinch a fold of your skin and measure it.  How is this treated?  Treatment for this condition often includes changing your  lifestyle. Treatment may include some or all of the following:  · Dietary changes. Work with your health care provider and a dietitian to set a weight-loss goal that is healthy and reasonable for you. Dietary changes may include eating:  ¨ Smaller portions. A portion size is the amount of a particular food that is healthy for you to eat at one time. This varies from person to person.  ¨ Low-calorie or low-fat options.  ¨ More whole grains, fruits, and vegetables.  · Regular physical activity. This may include aerobic activity (cardio) and strength training.  · Medicine to help you lose weight. Your health care provider may prescribe medicine if you are unable to lose 1 pound a week after 6 weeks of eating more healthily and doing more physical activity.  · Surgery. Surgical options may include gastric banding and gastric bypass. Surgery may be done if:  ¨ Other treatments have not helped to improve your condition.  ¨ You have a BMI of 40 or higher.  ¨ You have life-threatening health problems related to obesity.  Follow these instructions at home:     Eating and drinking     · Follow recommendations from your health care provider about what you eat and drink. Your health care provider may advise you to:  ¨ Limit fast foods, sweets, and processed snack foods.  ¨ Choose low-fat options, such as low-fat milk instead of whole milk.  ¨ Eat 5 or more servings of fruits or vegetables every day.  ¨ Eat at home more often. This gives you more control over what you eat.  ¨ Choose healthy foods when you eat out.  ¨ Learn what a healthy portion size is.  ¨ Keep low-fat snacks on hand.  ¨ Avoid sugary drinks, such as soda, fruit juice, iced tea sweetened with sugar, and flavored milk.  ¨ Eat a healthy breakfast.  · Drink enough water to keep your urine clear or pale yellow.  · Do not go without eating for long periods of time (do not fast) or follow a fad diet. Fasting and fad diets can be unhealthy and even dangerous.  Physical  Activity   · Exercise regularly, as told by your health care provider. Ask your health care provider what types of exercise are safe for you and how often you should exercise.  · Warm up and stretch before being active.  · Cool down and stretch after being active.  · Rest between periods of activity.  Lifestyle   · Limit the time that you spend in front of your TV, computer, or video game system.  · Find ways to reward yourself that do not involve food.  · Limit alcohol intake to no more than 1 drink a day for nonpregnant women and 2 drinks a day for men. One drink equals 12 oz of beer, 5 oz of wine, or 1½ oz of hard liquor.  General instructions   · Keep a weight loss journal to keep track of the food you eat and how much you exercise you get.  · Take over-the-counter and prescription medicines only as told by your health care provider.  · Take vitamins and supplements only as told by your health care provider.  · Consider joining a support group. Your health care provider may be able to recommend a support group.  · Keep all follow-up visits as told by your health care provider. This is important.  Contact a health care provider if:  · You are unable to meet your weight loss goal after 6 weeks of dietary and lifestyle changes.  This information is not intended to replace advice given to you by your health care provider. Make sure you discuss any questions you have with your health care provider.  Document Released: 01/25/2006 Document Revised: 05/22/2017 Document Reviewed: 10/05/2016  [employer] Interactive Patient Education © 2017 [employer]

## 2019-01-14 RX ORDER — LOVASTATIN 20 MG/1
20 TABLET ORAL NIGHTLY
Qty: 90 TABLET | Refills: 3 | Status: SHIPPED | OUTPATIENT
Start: 2019-01-14 | End: 2020-01-06 | Stop reason: SDUPTHER

## 2019-01-14 RX ORDER — CLOPIDOGREL BISULFATE 75 MG/1
75 TABLET ORAL DAILY
Qty: 90 TABLET | Refills: 3 | Status: SHIPPED | OUTPATIENT
Start: 2019-01-14 | End: 2020-03-16

## 2019-01-14 RX ORDER — LISINOPRIL 2.5 MG/1
2.5 TABLET ORAL DAILY
Qty: 90 TABLET | Refills: 3 | Status: SHIPPED | OUTPATIENT
Start: 2019-01-14 | End: 2020-01-06 | Stop reason: SDUPTHER

## 2019-01-22 ENCOUNTER — ANESTHESIA (OUTPATIENT)
Dept: GASTROENTEROLOGY | Facility: HOSPITAL | Age: 74
End: 2019-01-22

## 2019-01-22 ENCOUNTER — HOSPITAL ENCOUNTER (OUTPATIENT)
Facility: HOSPITAL | Age: 74
Setting detail: HOSPITAL OUTPATIENT SURGERY
Discharge: HOME | End: 2019-01-22
Attending: INTERNAL MEDICINE | Admitting: INTERNAL MEDICINE

## 2019-01-22 ENCOUNTER — ANESTHESIA EVENT (OUTPATIENT)
Dept: GASTROENTEROLOGY | Facility: HOSPITAL | Age: 74
End: 2019-01-22

## 2019-01-22 VITALS
HEIGHT: 71 IN | HEART RATE: 107 BPM | RESPIRATION RATE: 20 BRPM | DIASTOLIC BLOOD PRESSURE: 84 MMHG | TEMPERATURE: 97.9 F | SYSTOLIC BLOOD PRESSURE: 157 MMHG | BODY MASS INDEX: 28.28 KG/M2 | OXYGEN SATURATION: 97 % | WEIGHT: 202 LBS

## 2019-01-22 DIAGNOSIS — Z86.010 HX OF ADENOMATOUS COLONIC POLYPS: ICD-10-CM

## 2019-01-22 PROCEDURE — 45385 COLONOSCOPY W/LESION REMOVAL: CPT | Performed by: INTERNAL MEDICINE

## 2019-01-22 PROCEDURE — 88305 TISSUE EXAM BY PATHOLOGIST: CPT | Performed by: INTERNAL MEDICINE

## 2019-01-22 PROCEDURE — 25010000002 PROPOFOL 10 MG/ML EMULSION: Performed by: NURSE ANESTHETIST, CERTIFIED REGISTERED

## 2019-01-22 RX ORDER — SODIUM CHLORIDE 0.9 % (FLUSH) 0.9 %
3 SYRINGE (ML) INJECTION AS NEEDED
Status: DISCONTINUED | OUTPATIENT
Start: 2019-01-22 | End: 2019-01-22 | Stop reason: HOSPADM

## 2019-01-22 RX ORDER — SODIUM CHLORIDE 9 MG/ML
500 INJECTION, SOLUTION INTRAVENOUS CONTINUOUS PRN
Status: DISCONTINUED | OUTPATIENT
Start: 2019-01-22 | End: 2019-01-22 | Stop reason: HOSPADM

## 2019-01-22 RX ORDER — PROPOFOL 10 MG/ML
VIAL (ML) INTRAVENOUS AS NEEDED
Status: DISCONTINUED | OUTPATIENT
Start: 2019-01-22 | End: 2019-01-22 | Stop reason: SURG

## 2019-01-22 RX ORDER — LIDOCAINE HYDROCHLORIDE 20 MG/ML
INJECTION, SOLUTION INFILTRATION; PERINEURAL AS NEEDED
Status: DISCONTINUED | OUTPATIENT
Start: 2019-01-22 | End: 2019-01-22 | Stop reason: SURG

## 2019-01-22 RX ADMIN — SODIUM CHLORIDE 500 ML: 9 INJECTION, SOLUTION INTRAVENOUS at 07:44

## 2019-01-22 RX ADMIN — PROPOFOL 400 MG: 10 INJECTION, EMULSION INTRAVENOUS at 09:17

## 2019-01-22 RX ADMIN — LIDOCAINE HYDROCHLORIDE 50 MG: 20 INJECTION, SOLUTION INFILTRATION; PERINEURAL at 09:17

## 2019-01-22 NOTE — ANESTHESIA PREPROCEDURE EVALUATION
Anesthesia Evaluation     Patient summary reviewed   no history of anesthetic complications:  NPO Solid Status: > 8 hours             Airway   Mallampati: II  TM distance: >3 FB  Neck ROM: full  Dental    (+) upper dentures and lower dentures    Pulmonary    (+) a smoker, COPD,   Cardiovascular   Exercise tolerance: excellent (>7 METS)    (+) hypertension, past MI , CAD, cardiac stents (2005) hyperlipidemia,       Neuro/Psych- negative ROS  GI/Hepatic/Renal/Endo - negative ROS     Musculoskeletal     Abdominal    Substance History      OB/GYN          Other                        Anesthesia Plan    ASA 3     general     intravenous induction   Anesthetic plan, all risks, benefits, and alternatives have been provided, discussed and informed consent has been obtained with: patient.

## 2019-01-22 NOTE — ANESTHESIA POSTPROCEDURE EVALUATION
Patient: Prashanth Cantu    Procedure Summary     Date:  01/22/19 Room / Location:  [facility] ENDOSCOPY 4 / [facility] ENDOSCOPY    Anesthesia Start:  0914 Anesthesia Stop:  0931    Procedure:  COLONOSCOPY WITH ANESTHESIA (N/A ) Diagnosis:       Hx of adenomatous colonic polyps      (Hx of adenomatous colonic polyps [Z86.010])    Surgeon:  Dilip Vasquez MD Provider:  Eva Doyle CRNA    Anesthesia Type:  general ASA Status:  3          Anesthesia Type: general  Last vitals  BP   157/84 (01/22/19 0727)   Temp   97.9 °F (36.6 °C) (01/22/19 0727)   Pulse   107 (01/22/19 0727)   Resp   22 (01/22/19 0945)     SpO2   97 % (01/22/19 0727)     Post Anesthesia Care and Evaluation    Patient location during evaluation: PHASE II  Patient participation: complete - patient participated  Level of consciousness: awake and alert  Pain score: 0  Pain management: adequate  Airway patency: patent  Anesthetic complications: No anesthetic complications  PONV Status: none  Cardiovascular status: acceptable  Respiratory status: acceptable  Hydration status: acceptable  No anesthesia care post op

## 2019-01-23 LAB
CYTO UR: NORMAL
LAB AP CASE REPORT: NORMAL
PATH REPORT.FINAL DX SPEC: NORMAL
PATH REPORT.GROSS SPEC: NORMAL

## 2019-04-23 ENCOUNTER — OFFICE VISIT (OUTPATIENT)
Dept: OTOLARYNGOLOGY | Facility: CLINIC | Age: 74
End: 2019-04-23

## 2019-04-23 VITALS
BODY MASS INDEX: 27.41 KG/M2 | SYSTOLIC BLOOD PRESSURE: 130 MMHG | DIASTOLIC BLOOD PRESSURE: 84 MMHG | WEIGHT: 202.38 LBS | HEIGHT: 72 IN | HEART RATE: 66 BPM | TEMPERATURE: 97.5 F

## 2019-04-23 DIAGNOSIS — H61.22 IMPACTED CERUMEN OF LEFT EAR: ICD-10-CM

## 2019-04-23 DIAGNOSIS — H92.11 OTORRHEA OF RIGHT EAR: ICD-10-CM

## 2019-04-23 DIAGNOSIS — H65.33 CHRONIC MUCOID OTITIS MEDIA OF BOTH EARS: ICD-10-CM

## 2019-04-23 DIAGNOSIS — L92.9 GRANULATION TISSUE ABNORMALITY: ICD-10-CM

## 2019-04-23 DIAGNOSIS — H74.92 MASTOID DISORDER, LEFT: ICD-10-CM

## 2019-04-23 DIAGNOSIS — H69.83 DYSFUNCTION OF BOTH EUSTACHIAN TUBES: Primary | ICD-10-CM

## 2019-04-23 PROCEDURE — 99213 OFFICE O/P EST LOW 20 MIN: CPT | Performed by: NURSE PRACTITIONER

## 2019-04-23 PROCEDURE — 69220 CLEAN OUT MASTOID CAVITY: CPT | Performed by: NURSE PRACTITIONER

## 2019-04-23 RX ORDER — NEOMYCIN SULFATE, POLYMYXIN B SULFATE, HYDROCORTISONE 3.5; 10000; 1 MG/ML; [USP'U]/ML; MG/ML
3 SOLUTION/ DROPS AURICULAR (OTIC) 2 TIMES DAILY
Qty: 10 ML | Refills: 1 | Status: SHIPPED | OUTPATIENT
Start: 2019-04-23 | End: 2019-08-27 | Stop reason: CLARIF

## 2019-04-23 NOTE — PROGRESS NOTES
PROCEDURE NOTE    LOCATION:[facility]   PROVIDER:   JENA Knott   PREOPERATIVE DIAGNOSIS: Cerumen to mastoid bowl  POSTOPERATIVE DIAGNOSIS:Same  PROCEDURE:Cleaning of mastoid bowl  ANESTHESIA:None   REASON FOR THE PROCEDURE:  The patient verbally consented to intervention after a full discussion of risks, benefits, and alternatives. No guarantees were made or implied.   DETAILS OF THE PROCEDURE:The patient was seated upright in the exam room chair. The open head otoscope was used to visualize the ear canal and tympanic membrane. Squamoceruminous debris removed from left mastoid bowl and eac with suction.

## 2019-04-23 NOTE — PROGRESS NOTES
YOB: 1945  Location: [facility]  Location Address: [address] [place] [address], [facility], [address] [place] [address]  Location Phone: [phone]    Chief Complaint   Patient presents with   • Cerumen Impaction       History of Present Illness  Prashanth Cantu is a 74 y.o. male.  Prashanth Cantu is here for follow up of ENT complaints. The patient has had problems with otorrhea, cerumen accumulation, chronic fluid on the ear, a history of ear tube placement and a history of ear surgery  The symptoms are not localized to a particular location. The patient has had moderate symptoms. The symptoms have been present for the last several weeks The symptoms are aggravated by  allergy. The symptoms are improved by no identifiable factors.  Hx of mastoidectomy left, pet right  Wears right hearing aid.  Sees Dr Acevedo yearly       Past Medical History:   Diagnosis Date   • Cholesteatoma    • Chronic mastoiditis    • Chronic otitis externa    • Chronic otitis media    • Elevated cholesterol    • Eustachian tube dysfunction    • Hearing loss    • Heart disease    • Hx of colonic polyp    • Hx of myringotomy    • Hypertension    • Impacted cerumen    • Mastoiditis    • MI, old    • Polyp of right middle ear     granulation polyp of righttympanic membrane   • Tympanic membrane perforation        Past Surgical History:   Procedure Laterality Date   • COLONOSCOPY N/A 2019    Procedure: COLONOSCOPY WITH ANESTHESIA;  Surgeon: Dilip Vasquez MD;  Location: [facility] ENDOSCOPY;  Service: Gastroenterology   • COLONOSCOPY W/ POLYPECTOMY  2013    Tubular adenomatous polyp at 20 cm repeat exam in 5 years   • CORONARY ANGIOPLASTY WITH STENT PLACEMENT     • EAR TUBES Right    • SHOULDER SURGERY     • TYMPANOMASTOIDECTOMY  2015    left  [facility]       Outpatient Medications Marked as Taking for the 19 encounter (Office Visit) with Charlotte Dunne APRN   Medication Sig Dispense Refill   • aspirin 81 MG chewable tablet Chew  81 mg Daily.     • azelastine (ASTELIN) 0.1 % nasal spray USE TWO SPRAY(S) IN EACH NOSTRIL TWICE DAILY AS DIRECTED 1 each 6   • clopidogrel (PLAVIX) 75 MG tablet Take 75 mg by mouth Daily.     • fluticasone (FLONASE) 50 MCG/ACT nasal spray USE TWO SPRAY(S) IN EACH NOSTRIL ONCE DAILY FOR 30 DAYS 1 bottle 3   • fluticasone-salmeterol (ADVAIR) 100-50 MCG/DOSE DISKUS Inhale 2 (Two) Times a Day.     • gabapentin (NEURONTIN) 300 MG capsule Take 1 capsule by mouth.     • ibuprofen (ADVIL,MOTRIN) 800 MG tablet Take 800 mg by mouth.     • ipratropium (ATROVENT) 0.06 % nasal spray 2 sprays into each nostril 3 (Three) Times a Day As Needed for rhinitis (FOR RUNNY NOSE). 15 mL 11   • lisinopril (PRINIVIL,ZESTRIL) 2.5 MG tablet Take 2.5 mg by mouth Daily.     • lovastatin (MEVACOR) 10 MG tablet Take 10 mg by mouth Every Night.     • nitroglycerin (NITROSTAT) 0.4 MG SL tablet Place 0.4 mg under the tongue.     • oxyCODONE-acetaminophen (PERCOCET) 7.5-325 MG per tablet      • sildenafil (VIAGRA) 100 MG tablet Take 100 mg by mouth.     • tiZANidine (ZANAFLEX) 4 MG tablet Take 4 mg by mouth.         Patient has no known allergies.    Family History   Problem Relation Age of Onset   • Breast cancer Mother    • Colon cancer Neg Hx    • Colon polyps Neg Hx        Social History     Socioeconomic History   • Marital status:      Spouse name: Not on file   • Number of children: Not on file   • Years of education: Not on file   • Highest education level: Not on file   Tobacco Use   • Smoking status: Current Every Day Smoker     Packs/day: 1.00     Types: Cigarettes   • Smokeless tobacco: Never Used   Substance and Sexual Activity   • Alcohol use: No   • Drug use: Defer   • Sexual activity: Defer       Review of Systems   Constitutional: Negative.    HENT:        SEE HPI   Eyes: Negative.    Respiratory: Negative.    Cardiovascular: Negative.    Gastrointestinal: Negative.    Endocrine: Negative.    Genitourinary: Negative.     Musculoskeletal: Negative.    Skin: Negative.    Allergic/Immunologic: Negative.    Neurological: Negative.    Hematological: Negative.    Psychiatric/Behavioral: Negative.        Vitals:    04/23/19 1040   BP: 130/84   Pulse: 66   Temp: 97.5 °F (36.4 °C)       Body mass index is 27.45 kg/m².    Objective     Physical Exam  CONSTITUTIONAL: well nourished, alert, oriented, in no acute distress     COMMUNICATION AND VOICE: able to communicate normally, normal voice quality    HEAD: normocephalic, no lesions, atraumatic, no tenderness, no masses     FACE: appearance normal, no lesions, no tenderness, no deformities, facial motion symmetric        EYES: ocular motility normal, eyelids normal, orbits normal, no proptosis, conjunctiva normal , pupils equal, round     EARS:  Hearing: response to conversational voice severely impaired  External Ears: auricles without lesions  Otoscopic: right TM with Ttube with mild granulation posteriorly with otorrhea in canal removed with suction scant amount ACHS powder applied, left TM scarred with left mastoid cleaned see note  NOSE:  External Nose: structure normal, no tenderness on palpation, no nasal discharge, no lesions, no evidence of trauma, nostrils patent   Intranasal Exam: nasal mucosa normal, vestibule within normal limits, inferior turbinate normal, nasal septum midline     ORAL:  Lips: upper and lower lips without lesion   Teeth: dentition within normal limits for age   Gums: gingivae healthy   Oral Mucosa: oral mucosa normal, no mucosal lesions   Floor of Mouth: Warthin’s duct patent, mucosa normal  Tongue: lingual mucosa normal without lesions, normal tongue mobility   Palate: soft and hard palates with normal mucosa and structure  Oropharynx: oropharyngeal mucosa normal    NECK: neck appearance normal, no mass,  noted without erythema or tenderness    LYMPH NODES: no lymphadenopathy    CHEST/RESPIRATORY: respiratory effort normal,     CARDIOVASCULAR:, extremities  without cyanosis or edema      NEUROLOGIC/PSYCHIATRIC: oriented to time, place and person, mood normal, affect appropriate, CN II-XII intact grossly    Assessment/Plan   Bill was seen today for cerumen impaction.    Diagnoses and all orders for this visit:    Dysfunction of both eustachian tubes    Mastoid disorder, left    Otorrhea of right ear    Chronic mucoid otitis media of both ears    Impacted cerumen of left ear    Granulation tissue abnormality    Other orders  -     neomycin-polymyxin-hydrocortisone (CORTISPORIN) 1 % solution otic solution; Administer 3 drops to the right ear 2 (Two) Times a Day.      * Surgery not found *  No orders of the defined types were placed in this encounter.    Return in about 3 months (around 7/23/2019).       Patient Instructions   Dry ear precautions    Call for problems or worsening symptoms    Start drops in 3 days

## 2019-04-24 RX ORDER — FLUTICASONE PROPIONATE 50 MCG
2 SPRAY, SUSPENSION (ML) NASAL DAILY
Qty: 1 BOTTLE | Refills: 11 | Status: SHIPPED | OUTPATIENT
Start: 2019-04-24 | End: 2020-05-01 | Stop reason: SDUPTHER

## 2019-06-04 ENCOUNTER — TELEPHONE (OUTPATIENT)
Dept: INTERNAL MEDICINE | Age: 74
End: 2019-06-04

## 2019-06-04 NOTE — TELEPHONE ENCOUNTER
Patient wife called stating patient has been losing weight without trying. He has an appointment 7/10/19 but they are wondering if he needs to get labs done before then or sooner office visit.   Please advise

## 2019-07-05 DIAGNOSIS — I10 ESSENTIAL HYPERTENSION: ICD-10-CM

## 2019-07-05 DIAGNOSIS — M89.9 DISORDER OF BONE: ICD-10-CM

## 2019-07-05 DIAGNOSIS — I25.10 CORONARY ARTERY DISEASE INVOLVING NATIVE CORONARY ARTERY OF NATIVE HEART WITHOUT ANGINA PECTORIS: ICD-10-CM

## 2019-07-05 LAB
ALBUMIN SERPL-MCNC: 4.3 G/DL (ref 3.5–5.2)
ALP BLD-CCNC: 66 U/L (ref 40–130)
ALT SERPL-CCNC: 23 U/L (ref 5–41)
ANION GAP SERPL CALCULATED.3IONS-SCNC: 12 MMOL/L (ref 7–19)
AST SERPL-CCNC: 21 U/L (ref 5–40)
BASOPHILS ABSOLUTE: 0 K/UL (ref 0–0.2)
BASOPHILS RELATIVE PERCENT: 0.6 % (ref 0–1)
BILIRUB SERPL-MCNC: 0.9 MG/DL (ref 0.2–1.2)
BUN BLDV-MCNC: 25 MG/DL (ref 8–23)
CALCIUM SERPL-MCNC: 9.6 MG/DL (ref 8.8–10.2)
CHLORIDE BLD-SCNC: 107 MMOL/L (ref 98–111)
CHOLESTEROL, TOTAL: 109 MG/DL (ref 160–199)
CO2: 24 MMOL/L (ref 22–29)
CREAT SERPL-MCNC: 0.8 MG/DL (ref 0.5–1.2)
EOSINOPHILS ABSOLUTE: 0.1 K/UL (ref 0–0.6)
EOSINOPHILS RELATIVE PERCENT: 0.9 % (ref 0–5)
GFR NON-AFRICAN AMERICAN: >60
GLUCOSE BLD-MCNC: 101 MG/DL (ref 74–109)
HCT VFR BLD CALC: 43.9 % (ref 42–52)
HDLC SERPL-MCNC: 37 MG/DL (ref 55–121)
HEMOGLOBIN: 14.4 G/DL (ref 14–18)
LDL CHOLESTEROL CALCULATED: 48 MG/DL
LYMPHOCYTES ABSOLUTE: 2.1 K/UL (ref 1.1–4.5)
LYMPHOCYTES RELATIVE PERCENT: 38.2 % (ref 20–40)
MCH RBC QN AUTO: 31.1 PG (ref 27–31)
MCHC RBC AUTO-ENTMCNC: 32.8 G/DL (ref 33–37)
MCV RBC AUTO: 94.8 FL (ref 80–94)
MONOCYTES ABSOLUTE: 0.4 K/UL (ref 0–0.9)
MONOCYTES RELATIVE PERCENT: 6.7 % (ref 0–10)
NEUTROPHILS ABSOLUTE: 2.9 K/UL (ref 1.5–7.5)
NEUTROPHILS RELATIVE PERCENT: 53.2 % (ref 50–65)
PDW BLD-RTO: 12.3 % (ref 11.5–14.5)
PLATELET # BLD: 186 K/UL (ref 130–400)
PMV BLD AUTO: 10.2 FL (ref 9.4–12.4)
POTASSIUM SERPL-SCNC: 4.1 MMOL/L (ref 3.5–5)
RBC # BLD: 4.63 M/UL (ref 4.7–6.1)
SODIUM BLD-SCNC: 143 MMOL/L (ref 136–145)
TOTAL PROTEIN: 6.8 G/DL (ref 6.6–8.7)
TRIGL SERPL-MCNC: 118 MG/DL (ref 0–149)
VITAMIN D 25-HYDROXY: 31.6 NG/ML
WBC # BLD: 5.4 K/UL (ref 4.8–10.8)

## 2019-07-10 ENCOUNTER — OFFICE VISIT (OUTPATIENT)
Dept: INTERNAL MEDICINE | Age: 74
End: 2019-07-10
Payer: MEDICARE

## 2019-07-10 VITALS
WEIGHT: 200 LBS | RESPIRATION RATE: 18 BRPM | DIASTOLIC BLOOD PRESSURE: 62 MMHG | OXYGEN SATURATION: 97 % | SYSTOLIC BLOOD PRESSURE: 122 MMHG | HEART RATE: 74 BPM | BODY MASS INDEX: 27.09 KG/M2 | HEIGHT: 72 IN

## 2019-07-10 DIAGNOSIS — Z00.00 HEALTHCARE MAINTENANCE: ICD-10-CM

## 2019-07-10 DIAGNOSIS — Z00.00 ROUTINE GENERAL MEDICAL EXAMINATION AT A HEALTH CARE FACILITY: ICD-10-CM

## 2019-07-10 DIAGNOSIS — I25.10 CORONARY ARTERY DISEASE INVOLVING NATIVE CORONARY ARTERY OF NATIVE HEART WITHOUT ANGINA PECTORIS: ICD-10-CM

## 2019-07-10 DIAGNOSIS — E78.00 PURE HYPERCHOLESTEROLEMIA: ICD-10-CM

## 2019-07-10 DIAGNOSIS — Z87.891 PERSONAL HISTORY OF TOBACCO USE: ICD-10-CM

## 2019-07-10 DIAGNOSIS — R25.2 CRAMPING OF HANDS: Primary | ICD-10-CM

## 2019-07-10 DIAGNOSIS — J44.9 CHRONIC OBSTRUCTIVE PULMONARY DISEASE, UNSPECIFIED COPD TYPE (HCC): ICD-10-CM

## 2019-07-10 DIAGNOSIS — Z12.5 ENCOUNTER FOR SCREENING FOR MALIGNANT NEOPLASM OF PROSTATE: ICD-10-CM

## 2019-07-10 DIAGNOSIS — M89.9 DISORDER OF BONE: ICD-10-CM

## 2019-07-10 DIAGNOSIS — G89.4 CHRONIC PAIN SYNDROME: ICD-10-CM

## 2019-07-10 PROCEDURE — G0439 PPPS, SUBSEQ VISIT: HCPCS | Performed by: NURSE PRACTITIONER

## 2019-07-10 PROCEDURE — G8598 ASA/ANTIPLAT THER USED: HCPCS | Performed by: NURSE PRACTITIONER

## 2019-07-10 PROCEDURE — 3017F COLORECTAL CA SCREEN DOC REV: CPT | Performed by: NURSE PRACTITIONER

## 2019-07-10 PROCEDURE — 4040F PNEUMOC VAC/ADMIN/RCVD: CPT | Performed by: NURSE PRACTITIONER

## 2019-07-10 PROCEDURE — G8419 CALC BMI OUT NRM PARAM NOF/U: HCPCS | Performed by: NURSE PRACTITIONER

## 2019-07-10 PROCEDURE — 99214 OFFICE O/P EST MOD 30 MIN: CPT | Performed by: NURSE PRACTITIONER

## 2019-07-10 PROCEDURE — G8926 SPIRO NO PERF OR DOC: HCPCS | Performed by: NURSE PRACTITIONER

## 2019-07-10 PROCEDURE — G8427 DOCREV CUR MEDS BY ELIG CLIN: HCPCS | Performed by: NURSE PRACTITIONER

## 2019-07-10 PROCEDURE — 1123F ACP DISCUSS/DSCN MKR DOCD: CPT | Performed by: NURSE PRACTITIONER

## 2019-07-10 PROCEDURE — 3023F SPIROM DOC REV: CPT | Performed by: NURSE PRACTITIONER

## 2019-07-10 PROCEDURE — G0296 VISIT TO DETERM LDCT ELIG: HCPCS | Performed by: NURSE PRACTITIONER

## 2019-07-10 PROCEDURE — 4004F PT TOBACCO SCREEN RCVD TLK: CPT | Performed by: NURSE PRACTITIONER

## 2019-07-10 RX ORDER — GABAPENTIN 300 MG/1
CAPSULE ORAL
Refills: 5 | COMMUNITY
Start: 2019-05-23

## 2019-07-10 ASSESSMENT — LIFESTYLE VARIABLES: HOW OFTEN DO YOU HAVE A DRINK CONTAINING ALCOHOL: 0

## 2019-07-10 ASSESSMENT — ENCOUNTER SYMPTOMS
TROUBLE SWALLOWING: 0
DIARRHEA: 0
SHORTNESS OF BREATH: 1
COUGH: 0
ABDOMINAL DISTENTION: 0
ABDOMINAL PAIN: 0
EYE DISCHARGE: 0
CONSTIPATION: 0
STRIDOR: 0
SORE THROAT: 0
NAUSEA: 0
CHOKING: 0
VOMITING: 0
EYE ITCHING: 0
BACK PAIN: 1
WHEEZING: 0
BLOOD IN STOOL: 0
COLOR CHANGE: 0

## 2019-07-10 ASSESSMENT — PATIENT HEALTH QUESTIONNAIRE - PHQ9
SUM OF ALL RESPONSES TO PHQ QUESTIONS 1-9: 0
SUM OF ALL RESPONSES TO PHQ QUESTIONS 1-9: 0

## 2019-07-10 ASSESSMENT — ANXIETY QUESTIONNAIRES: GAD7 TOTAL SCORE: 0

## 2019-07-10 NOTE — PROGRESS NOTES
maintenance. Instructed to continue current medications, diet and exercise. Patient agreed with treatment plan. Follow up as directed. MEDICATIONS:  No orders of the defined types were placed in this encounter. Quality & Risk Score Accuracy    Visit Dx:  J44.9 - Chronic obstructive pulmonary disease, unspecified COPD type ([facility] [address]  Assessment and plan:  Stable based upon symptoms and exam. Continue current treatment plan and follow up at least yearly. Last edited 07/10/19 10:09 CDT by ALLYN Zapata         ORDERS:  Orders Placed This Encounter   Procedures    US ABDOMINAL AORTA LIMITED    CT Lung Screen (Annual)    CBC Auto Differential    Comprehensive Metabolic Panel    Lipid Panel    Vitamin D 25 Hydroxy    Urinalysis Reflex to Culture    TSH without Reflex    Psa screening    MI VISIT TO DISCUSS LUNG CA SCREEN W LDCT       Follow-up:  Return in about 6 months (around 1/10/2020) for have labs done prior to appt, yearly physical.    PATIENT INSTRUCTIONS:  Patient Instructions   1. Cramping;  Restart the magnesium   2. Weight loss;  Monitor for now and get lung screening   3. Chronic pain syndrome;  folllowed by Angelinaer   4. CAD; Stable on plavix;   5. Copd;  Stable with current regimen of in halers   6. Hyperlipidemia  The current medical regimen is effective;  continue present plan and medications. What is lung cancer screening? Lung cancer screening is a way in which doctors check the lungs for early signs of cancer in people who have no symptoms of lung cancer. A low-dose CT scan uses much less radiation than a normal CT scan and shows a more detailed image of the lungs than a standard X-ray. The goal of lung cancer screening is to find cancer early, before it has a chance to grow, spread, or cause problems.   One large study found that smokers who were screened with low-dose CT scans were less likely to die of lung cancer than those who were screened with standard

## 2019-07-22 ENCOUNTER — HOSPITAL ENCOUNTER (OUTPATIENT)
Dept: ULTRASOUND IMAGING | Age: 74
Discharge: HOME | End: 2019-07-22
Payer: MEDICARE

## 2019-07-22 ENCOUNTER — HOSPITAL ENCOUNTER (OUTPATIENT)
Dept: CT IMAGING | Age: 74
Discharge: HOME | End: 2019-07-22
Payer: MEDICARE

## 2019-07-22 DIAGNOSIS — Z00.00 HEALTHCARE MAINTENANCE: ICD-10-CM

## 2019-07-22 DIAGNOSIS — Z87.891 PERSONAL HISTORY OF TOBACCO USE: ICD-10-CM

## 2019-07-22 PROCEDURE — G0297 LDCT FOR LUNG CA SCREEN: HCPCS

## 2019-07-22 PROCEDURE — 76706 US ABDL AORTA SCREEN AAA: CPT

## 2019-07-25 DIAGNOSIS — I25.10 CORONARY ARTERY DISEASE INVOLVING NATIVE CORONARY ARTERY OF NATIVE HEART WITHOUT ANGINA PECTORIS: ICD-10-CM

## 2019-07-26 RX ORDER — NITROGLYCERIN 0.4 MG/1
0.4 TABLET SUBLINGUAL EVERY 5 MIN PRN
Qty: 25 TABLET | Refills: 1 | Status: SHIPPED | OUTPATIENT
Start: 2019-07-26

## 2019-07-31 ENCOUNTER — OFFICE VISIT (OUTPATIENT)
Dept: OTOLARYNGOLOGY | Facility: CLINIC | Age: 74
End: 2019-07-31

## 2019-07-31 DIAGNOSIS — H70.12 CHRONIC MASTOIDITIS OF LEFT SIDE: Primary | ICD-10-CM

## 2019-07-31 PROBLEM — H70.10 CHRONIC MASTOIDITIS: Status: ACTIVE | Noted: 2019-07-31

## 2019-07-31 PROCEDURE — 69222 CLEAN OUT MASTOID CAVITY: CPT | Performed by: PHYSICIAN ASSISTANT

## 2019-07-31 NOTE — PROGRESS NOTES
Procedure Note    Pre-operative Diagnosis: Cerumen and debris accumulation s/p modified radical mastoidectomy    Post-operative Diagnosis: same    Anesthesia: none    Procedure:  Binocular ear microscopy with debridement of left mastoid cavity    Procedure Details:    The patient was placed supine on the procedure table. Using a speculum, the ear was examined with a microscope. The mastoid cavity was debrided.     Findings: moderate amount of thick, moist cerumen removed with moderate amount of thick mucoid drainage removed. After cleaning mastoid cavity, ACHS powder was placed.    Condition:  Stable.  Patient tolerated procedure well.    Complications:  None

## 2019-08-27 ENCOUNTER — TELEPHONE (OUTPATIENT)
Dept: OTOLARYNGOLOGY | Facility: CLINIC | Age: 74
End: 2019-08-27

## 2019-08-27 RX ORDER — CIPROFLOXACIN AND DEXAMETHASONE 3; 1 MG/ML; MG/ML
4 SUSPENSION/ DROPS AURICULAR (OTIC) 2 TIMES DAILY
Qty: 7.5 ML | Refills: 0 | Status: SHIPPED | OUTPATIENT
Start: 2019-08-27 | End: 2020-01-31 | Stop reason: SDUPTHER

## 2019-10-30 ENCOUNTER — OFFICE VISIT (OUTPATIENT)
Dept: OTOLARYNGOLOGY | Facility: CLINIC | Age: 74
End: 2019-10-30

## 2019-10-30 VITALS
DIASTOLIC BLOOD PRESSURE: 76 MMHG | SYSTOLIC BLOOD PRESSURE: 118 MMHG | HEART RATE: 74 BPM | WEIGHT: 197.4 LBS | OXYGEN SATURATION: 99 % | TEMPERATURE: 97.3 F | RESPIRATION RATE: 18 BRPM | HEIGHT: 71 IN | BODY MASS INDEX: 27.64 KG/M2

## 2019-10-30 DIAGNOSIS — H70.12 CHRONIC MASTOIDITIS OF LEFT SIDE: Primary | ICD-10-CM

## 2019-10-30 DIAGNOSIS — H61.23 BILATERAL IMPACTED CERUMEN: ICD-10-CM

## 2019-10-30 DIAGNOSIS — Z79.02 PLATELET INHIBITION DUE TO PLAVIX: ICD-10-CM

## 2019-10-30 PROCEDURE — 69220 CLEAN OUT MASTOID CAVITY: CPT | Performed by: PHYSICIAN ASSISTANT

## 2019-10-30 PROCEDURE — 69210 REMOVE IMPACTED EAR WAX UNI: CPT | Performed by: PHYSICIAN ASSISTANT

## 2019-10-30 RX ORDER — AZELASTINE 1 MG/ML
2 SPRAY, METERED NASAL 2 TIMES DAILY
Qty: 1 EACH | Refills: 11 | Status: SHIPPED | OUTPATIENT
Start: 2019-10-30 | End: 2020-11-09

## 2019-10-30 RX ORDER — LOVASTATIN 20 MG/1
20 TABLET ORAL NIGHTLY
Refills: 3 | COMMUNITY
Start: 2019-10-11 | End: 2022-04-20 | Stop reason: SDUPTHER

## 2019-10-30 NOTE — PROGRESS NOTES
Procedure Note    Pre-operative Diagnosis: Cerumen and debris accumulation s/p modified radical mastoidectomy and cerumen impaction of right ear    Post-operative Diagnosis: same    Anesthesia: none    Procedure:  Binocular ear microscopy with debridement of left mastoid cavity and removal of cerumen impaction of right ear    Procedure Details:    The patient was placed supine on the procedure table. Using a speculum, the ear was examined with a microscope. The mastoid cavity was debrided.     Findings: moderate amount of thick, moist cerumen removed with moderate amount of thick mucoid drainage removed from both ears.    Condition:  Stable.  Patient tolerated procedure well.    Complications:  None

## 2019-11-19 PROBLEM — M15.0 PRIMARY OSTEOARTHRITIS INVOLVING MULTIPLE JOINTS: Status: ACTIVE | Noted: 2019-11-19

## 2019-11-19 PROBLEM — M15.9 PRIMARY OSTEOARTHRITIS INVOLVING MULTIPLE JOINTS: Status: ACTIVE | Noted: 2019-11-19

## 2019-12-30 DIAGNOSIS — Z12.5 ENCOUNTER FOR SCREENING FOR MALIGNANT NEOPLASM OF PROSTATE: ICD-10-CM

## 2019-12-30 DIAGNOSIS — Z00.00 HEALTHCARE MAINTENANCE: ICD-10-CM

## 2019-12-30 DIAGNOSIS — M89.9 DISORDER OF BONE: ICD-10-CM

## 2019-12-30 LAB
ALBUMIN SERPL-MCNC: 4.5 G/DL (ref 3.5–5.2)
ALP BLD-CCNC: 71 U/L (ref 40–130)
ALT SERPL-CCNC: 17 U/L (ref 5–41)
ANION GAP SERPL CALCULATED.3IONS-SCNC: 15 MMOL/L (ref 7–19)
AST SERPL-CCNC: 17 U/L (ref 5–40)
BACTERIA: NEGATIVE /HPF
BASOPHILS ABSOLUTE: 0.1 K/UL (ref 0–0.2)
BASOPHILS RELATIVE PERCENT: 0.9 % (ref 0–1)
BILIRUB SERPL-MCNC: 1.1 MG/DL (ref 0.2–1.2)
BILIRUBIN URINE: NEGATIVE
BLOOD, URINE: ABNORMAL
BUN BLDV-MCNC: 13 MG/DL (ref 8–23)
CALCIUM SERPL-MCNC: 9.8 MG/DL (ref 8.8–10.2)
CHLORIDE BLD-SCNC: 105 MMOL/L (ref 98–111)
CHOLESTEROL, TOTAL: 122 MG/DL (ref 160–199)
CLARITY: CLEAR
CO2: 23 MMOL/L (ref 22–29)
COLOR: YELLOW
CREAT SERPL-MCNC: 0.9 MG/DL (ref 0.5–1.2)
EOSINOPHILS ABSOLUTE: 0.1 K/UL (ref 0–0.6)
EOSINOPHILS RELATIVE PERCENT: 1.4 % (ref 0–5)
EPITHELIAL CELLS, UA: 0 /HPF (ref 0–5)
GFR NON-AFRICAN AMERICAN: >60
GLUCOSE BLD-MCNC: 101 MG/DL (ref 74–109)
GLUCOSE URINE: NEGATIVE MG/DL
HCT VFR BLD CALC: 46.7 % (ref 42–52)
HDLC SERPL-MCNC: 42 MG/DL (ref 55–121)
HEMOGLOBIN: 15.1 G/DL (ref 14–18)
HYALINE CASTS: 1 /HPF (ref 0–8)
IMMATURE GRANULOCYTES #: 0 K/UL
KETONES, URINE: NEGATIVE MG/DL
LDL CHOLESTEROL CALCULATED: 62 MG/DL
LEUKOCYTE ESTERASE, URINE: ABNORMAL
LYMPHOCYTES ABSOLUTE: 1.9 K/UL (ref 1.1–4.5)
LYMPHOCYTES RELATIVE PERCENT: 34.2 % (ref 20–40)
MCH RBC QN AUTO: 31 PG (ref 27–31)
MCHC RBC AUTO-ENTMCNC: 32.3 G/DL (ref 33–37)
MCV RBC AUTO: 95.9 FL (ref 80–94)
MONOCYTES ABSOLUTE: 0.4 K/UL (ref 0–0.9)
MONOCYTES RELATIVE PERCENT: 7.4 % (ref 0–10)
NEUTROPHILS ABSOLUTE: 3.1 K/UL (ref 1.5–7.5)
NEUTROPHILS RELATIVE PERCENT: 55.9 % (ref 50–65)
NITRITE, URINE: NEGATIVE
PDW BLD-RTO: 12.5 % (ref 11.5–14.5)
PH UA: 6 (ref 5–8)
PLATELET # BLD: 204 K/UL (ref 130–400)
PMV BLD AUTO: 9.9 FL (ref 9.4–12.4)
POTASSIUM SERPL-SCNC: 4.3 MMOL/L (ref 3.5–5)
PROSTATE SPECIFIC ANTIGEN: 1.71 NG/ML (ref 0–4)
PROTEIN UA: NEGATIVE MG/DL
RBC # BLD: 4.87 M/UL (ref 4.7–6.1)
RBC UA: 1 /HPF (ref 0–4)
SODIUM BLD-SCNC: 143 MMOL/L (ref 136–145)
SPECIFIC GRAVITY UA: 1.02 (ref 1–1.03)
TOTAL PROTEIN: 7.1 G/DL (ref 6.6–8.7)
TRIGL SERPL-MCNC: 92 MG/DL (ref 0–149)
TSH SERPL DL<=0.05 MIU/L-ACNC: 3.31 UIU/ML (ref 0.27–4.2)
URINE REFLEX TO CULTURE: YES
UROBILINOGEN, URINE: 0.2 E.U./DL
VITAMIN D 25-HYDROXY: 25.4 NG/ML
WBC # BLD: 5.6 K/UL (ref 4.8–10.8)
WBC UA: 14 /HPF (ref 0–5)

## 2020-01-01 LAB — URINE CULTURE, ROUTINE: NORMAL

## 2020-01-06 ENCOUNTER — OFFICE VISIT (OUTPATIENT)
Dept: INTERNAL MEDICINE | Age: 75
End: 2020-01-06
Payer: MEDICARE

## 2020-01-06 VITALS
BODY MASS INDEX: 27.22 KG/M2 | WEIGHT: 201 LBS | OXYGEN SATURATION: 97 % | HEART RATE: 100 BPM | DIASTOLIC BLOOD PRESSURE: 85 MMHG | HEIGHT: 72 IN | SYSTOLIC BLOOD PRESSURE: 129 MMHG

## 2020-01-06 PROBLEM — Z72.0 TOBACCO ABUSE: Status: ACTIVE | Noted: 2020-01-06

## 2020-01-06 PROBLEM — I71.40 ABDOMINAL AORTIC ANEURYSM (AAA) WITHOUT RUPTURE (HCC): Status: ACTIVE | Noted: 2020-01-06

## 2020-01-06 PROCEDURE — 99214 OFFICE O/P EST MOD 30 MIN: CPT | Performed by: NURSE PRACTITIONER

## 2020-01-06 PROCEDURE — 4004F PT TOBACCO SCREEN RCVD TLK: CPT | Performed by: NURSE PRACTITIONER

## 2020-01-06 PROCEDURE — G8427 DOCREV CUR MEDS BY ELIG CLIN: HCPCS | Performed by: NURSE PRACTITIONER

## 2020-01-06 PROCEDURE — 1123F ACP DISCUSS/DSCN MKR DOCD: CPT | Performed by: NURSE PRACTITIONER

## 2020-01-06 PROCEDURE — 93000 ELECTROCARDIOGRAM COMPLETE: CPT | Performed by: NURSE PRACTITIONER

## 2020-01-06 PROCEDURE — 3023F SPIROM DOC REV: CPT | Performed by: NURSE PRACTITIONER

## 2020-01-06 PROCEDURE — 3017F COLORECTAL CA SCREEN DOC REV: CPT | Performed by: NURSE PRACTITIONER

## 2020-01-06 PROCEDURE — G8926 SPIRO NO PERF OR DOC: HCPCS | Performed by: NURSE PRACTITIONER

## 2020-01-06 PROCEDURE — G8482 FLU IMMUNIZE ORDER/ADMIN: HCPCS | Performed by: NURSE PRACTITIONER

## 2020-01-06 PROCEDURE — G8417 CALC BMI ABV UP PARAM F/U: HCPCS | Performed by: NURSE PRACTITIONER

## 2020-01-06 PROCEDURE — 4040F PNEUMOC VAC/ADMIN/RCVD: CPT | Performed by: NURSE PRACTITIONER

## 2020-01-06 RX ORDER — DONEPEZIL HYDROCHLORIDE 10 MG/1
10 TABLET, FILM COATED ORAL NIGHTLY
Qty: 90 TABLET | Refills: 1 | Status: SHIPPED | OUTPATIENT
Start: 2020-01-06 | End: 2020-07-02 | Stop reason: SDUPTHER

## 2020-01-06 RX ORDER — LISINOPRIL 2.5 MG/1
2.5 TABLET ORAL DAILY
Qty: 90 TABLET | Refills: 3 | Status: SHIPPED | OUTPATIENT
Start: 2020-01-06 | End: 2020-12-09 | Stop reason: SDUPTHER

## 2020-01-06 RX ORDER — ERGOCALCIFEROL 1.25 MG/1
50000 CAPSULE ORAL WEEKLY
Qty: 30 CAPSULE | Refills: 3 | Status: SHIPPED | OUTPATIENT
Start: 2020-01-06 | End: 2020-12-09 | Stop reason: SDUPTHER

## 2020-01-06 RX ORDER — LOVASTATIN 20 MG/1
20 TABLET ORAL NIGHTLY
Qty: 90 TABLET | Refills: 3 | Status: SHIPPED | OUTPATIENT
Start: 2020-01-06 | End: 2020-12-09 | Stop reason: SDUPTHER

## 2020-01-06 ASSESSMENT — ENCOUNTER SYMPTOMS
BLOOD IN STOOL: 0
ABDOMINAL DISTENTION: 0
TROUBLE SWALLOWING: 0
WHEEZING: 0
SORE THROAT: 0
NAUSEA: 0
DIARRHEA: 0
BACK PAIN: 1
VOMITING: 0
ABDOMINAL PAIN: 0
CHOKING: 0
COLOR CHANGE: 0
EYE ITCHING: 0
SHORTNESS OF BREATH: 0
STRIDOR: 0
COUGH: 0
CONSTIPATION: 0
EYE DISCHARGE: 0

## 2020-01-06 ASSESSMENT — PATIENT HEALTH QUESTIONNAIRE - PHQ9
SUM OF ALL RESPONSES TO PHQ QUESTIONS 1-9: 0
1. LITTLE INTEREST OR PLEASURE IN DOING THINGS: 0
2. FEELING DOWN, DEPRESSED OR HOPELESS: 0
SUM OF ALL RESPONSES TO PHQ QUESTIONS 1-9: 0
SUM OF ALL RESPONSES TO PHQ9 QUESTIONS 1 & 2: 0

## 2020-01-06 NOTE — PATIENT INSTRUCTIONS
1.  Coronary artery disease we will get a EKG in the office today and set him up for stress test since he has not had any follow-up in quite some time  2. Essential hypertension is stable no changes are necessary  3. Chronic pain syndrome he is followed [facility]  4. COPD is stable on current dose of Advair no exacerbations no changes are necessary; he will have his repeat CT screening in July 2020  5. Cognitive complaints we will start Aricept 10 mg at bedtime should you develop diarrhea or other side effects please let us know. We will try this for few months and if not a lot of improvement we can add Namenda later  6. Hyperlipidemia stable on current dose of medications no changes are necessary  7.   Tobacco abuse no changes currently he does not plan to stop  #8 abdominal aortic aneurysm; will have repeat ultrasound 2020

## 2020-01-06 NOTE — PROGRESS NOTES
[facility] INTERNAL MEDICINE  [address] [address]  [address] [place] [address]  Dept: [phone]  Dept Fax: [phone]  Loc: [phone]    Jerry Jackson is a 75 y.o. male who presents today for his medical conditions/complaints as noted below. Jerry Jackson is c/robinson Hypertension (Patient is here for routine follow up visit and review of labs done 12/30.)        HPI:     HPI   1. Cad with history of stent; He is having excessove cold intolerance; Would like to cut back on plavix; His stents were in 2005; He has never seen any cardiology that he can remember. He has had no recent chest pain or palpitations. His stent was LAD with Dr. Peggy Olmedo  2. HTN:  Stable on current meds; No side effects of the meds; Takes as directed; takes blood pressure 3-4 times a week   3. Chronic pain syndrome  He goes to pain management Dr Fernie Gonsales he is on oxycodone and gabapentin \  4. COPD  Stable takes advair daily; Has good control  5. Cognitive concerns is noticing his memory is getting worse. He is willing to try some medications he is having problems remembering names and numbers. He is not have any issues like getting lost driving or anything like that is most just simple everyday things that he is forgetting. 6.  Hyperlipidemia-  Stable on current meds; Takes as directed; No side effects of the meds;  #7 continued tobacco use. He smokes about half to 1 pack/day. He has smoked for over 60 years. He has had CT screening in July 2019 we will repeat that in 2020  #8 abdominal aortic aneurysm 2.1 we will repeat that scan this summer as well. Chief Complaint   Patient presents with    Hypertension     Patient is here for routine follow up visit and review of labs done 12/30.        Past Medical History:   Diagnosis Date    Adenomatous colon polyp     CAD (coronary artery disease)     LAD stent 3/2005 [facility]    COPD (chronic obstructive pulmonary disease) ([facility] [address]     tablet by mouth 3 times daily 30 tablet 5    sildenafil (VIAGRA) 100 MG tablet Take 1 tablet by mouth as needed for Erectile Dysfunction 10 tablet 1    Cyanocobalamin (VITAMIN B 12 PO) Take 1 tablet by mouth daily      aspirin 81 MG EC tablet Take 81 mg by mouth daily      oxyCODONE-acetaminophen (PERCOCET) 7.5-325 MG per tablet       fluticasone (FLONASE) 50 MCG/ACT nasal spray       ibuprofen (ADVIL;MOTRIN) 800 MG tablet Take 1 tablet by mouth 2 times daily as needed for Pain 120 tablet 1     No current facility-administered medications for this visit.       No Known Allergies    Health Maintenance   Topic Date Due    DTaP/Tdap/Td vaccine (1 - Tdap) 01/23/2021 (Originally 4/3/1956)    Shingles Vaccine (1 of 2) 07/10/2020 (Originally [birth year]1994)    Annual Wellness Visit (AWV)  07/09/2020    Low dose CT lung screening  07/22/2020    Lipid screen  12/30/2020    Potassium monitoring  12/30/2020    Creatinine monitoring  12/30/2020    Colon cancer screen colonoscopy  01/22/2029    Flu vaccine  Completed    Pneumococcal 65+ years Vaccine  Completed    AAA screen  Completed       No results found for: LABA1C  Lab Results   Component Value Date    PSA 1.71 12/30/2019    PSA 1.49 12/26/2018    PSA 1.16 12/13/2017     TSH   Date Value Ref Range Status   12/30/2019 3.310 0.270 - 4.200 uIU/mL Final   ]  Lab Results   Component Value Date     12/30/2019    K 4.3 12/30/2019     12/30/2019    CO2 23 12/30/2019    BUN 13 12/30/2019    CREATININE 0.9 12/30/2019    GLUCOSE 101 12/30/2019    CALCIUM 9.8 12/30/2019    PROT 7.1 12/30/2019    LABALBU 4.5 12/30/2019    BILITOT 1.1 12/30/2019    ALKPHOS 71 12/30/2019    AST 17 12/30/2019    ALT 17 12/30/2019    LABGLOM >60 12/30/2019     Lab Results   Component Value Date    CHOL 122 (L) 12/30/2019    CHOL 109 (L) 07/05/2019    CHOL 121 (L) 12/26/2018     Lab Results   Component Value Date    TRIG 92 12/30/2019    TRIG 118 07/05/2019    TRIG 137 12/26/2018 easily. Psychiatric/Behavioral: Negative for confusion and hallucinations. Objective:     Physical Exam  Constitutional:       General: He is not in acute distress. Appearance: He is well-developed. HENT:      Head: Normocephalic and atraumatic. Eyes:      General: No scleral icterus. Right eye: No discharge. Left eye: No discharge. Pupils: Pupils are equal, round, and reactive to light. Neck:      Musculoskeletal: Normal range of motion and neck supple. Thyroid: No thyromegaly. Vascular: No JVD. Cardiovascular:      Rate and Rhythm: Normal rate and regular rhythm. Heart sounds: Normal heart sounds. No murmur. Pulmonary:      Effort: Pulmonary effort is normal. No respiratory distress. Breath sounds: Normal breath sounds. No wheezing or rales. Abdominal:      General: Bowel sounds are normal. There is no distension. Palpations: Abdomen is soft. There is no mass. Tenderness: There is no tenderness. There is no guarding or rebound. Musculoskeletal: Normal range of motion. General: No tenderness. Skin:     General: Skin is warm and dry. Findings: No erythema or rash. Neurological:      Mental Status: He is alert and oriented to person, place, and time. Cranial Nerves: No cranial nerve deficit. Coordination: Coordination normal.      Deep Tendon Reflexes: Reflexes are normal and symmetric. Reflexes normal.   Psychiatric:         Mood and Affect: Mood is not depressed. Behavior: Behavior normal.         Thought Content: Thought content normal.         Judgment: Judgment normal.       /85   Pulse 100   Ht 6' (1.829 m)   Wt 201 lb (91.2 kg)   SpO2 97%   BMI 27.26 kg/m²     Assessment:       Diagnosis Orders   1. Coronary artery disease involving native coronary artery of native heart without angina pectoris  CARDIAC STRESS TEST EXERCISE ONLY    EKG 12 lead    EKG 12 lead   2.  Essential hypertension

## 2020-01-31 ENCOUNTER — OFFICE VISIT (OUTPATIENT)
Dept: OTOLARYNGOLOGY | Facility: CLINIC | Age: 75
End: 2020-01-31

## 2020-01-31 DIAGNOSIS — H70.12 CHRONIC MASTOIDITIS OF LEFT SIDE: ICD-10-CM

## 2020-01-31 DIAGNOSIS — H61.23 BILATERAL IMPACTED CERUMEN: Primary | ICD-10-CM

## 2020-01-31 PROCEDURE — 69222 CLEAN OUT MASTOID CAVITY: CPT | Performed by: PHYSICIAN ASSISTANT

## 2020-01-31 PROCEDURE — 69210 REMOVE IMPACTED EAR WAX UNI: CPT | Performed by: PHYSICIAN ASSISTANT

## 2020-01-31 RX ORDER — CIPROFLOXACIN AND DEXAMETHASONE 3; 1 MG/ML; MG/ML
4 SUSPENSION/ DROPS AURICULAR (OTIC) DAILY
Qty: 7.5 ML | Refills: 11 | Status: SHIPPED | OUTPATIENT
Start: 2020-01-31 | End: 2020-05-04 | Stop reason: CLARIF

## 2020-03-16 RX ORDER — CLOPIDOGREL BISULFATE 75 MG/1
TABLET ORAL
Qty: 90 TABLET | Refills: 0 | Status: SHIPPED | OUTPATIENT
Start: 2020-03-16 | End: 2020-06-12

## 2020-05-01 ENCOUNTER — OFFICE VISIT (OUTPATIENT)
Dept: OTOLARYNGOLOGY | Facility: CLINIC | Age: 75
End: 2020-05-01

## 2020-05-01 DIAGNOSIS — H72.93 PERFORATION OF BOTH TYMPANIC MEMBRANES: ICD-10-CM

## 2020-05-01 DIAGNOSIS — H70.12 CHRONIC MASTOIDITIS OF LEFT SIDE: ICD-10-CM

## 2020-05-01 DIAGNOSIS — H61.23 BILATERAL IMPACTED CERUMEN: Primary | ICD-10-CM

## 2020-05-01 PROCEDURE — 69210 REMOVE IMPACTED EAR WAX UNI: CPT | Performed by: PHYSICIAN ASSISTANT

## 2020-05-01 PROCEDURE — 69220 CLEAN OUT MASTOID CAVITY: CPT | Performed by: PHYSICIAN ASSISTANT

## 2020-05-01 RX ORDER — FLUTICASONE PROPIONATE 50 MCG
2 SPRAY, SUSPENSION (ML) NASAL DAILY
Qty: 1 BOTTLE | Refills: 11 | Status: SHIPPED | OUTPATIENT
Start: 2020-05-01 | End: 2021-06-11

## 2020-05-04 ENCOUNTER — TELEPHONE (OUTPATIENT)
Dept: OTOLARYNGOLOGY | Facility: CLINIC | Age: 75
End: 2020-05-04

## 2020-05-04 RX ORDER — NEOMYCIN SULFATE, POLYMYXIN B SULFATE, HYDROCORTISONE 3.5; 10000; 1 MG/ML; [USP'U]/ML; MG/ML
3 SOLUTION/ DROPS AURICULAR (OTIC) 2 TIMES DAILY
Qty: 10 ML | Refills: 0 | Status: SHIPPED | OUTPATIENT
Start: 2020-05-04 | End: 2020-05-14

## 2020-05-04 NOTE — TELEPHONE ENCOUNTER
----- Message from SHON Rodriguez sent at 5/1/2020  4:12 PM CDT -----  Regarding: RE:   He had drainage and a large perf from where the tube was. Go ahead and start cortisporin in the right ear for ten days 4 drops three times a day.  ----- Message -----  From: Charlotte Bravo RN  Sent: 5/1/2020   4:09 PM CDT  To: SHON Rodriguez    Wife called and stated patient cant hear, she thinks tube needs to be replaced?

## 2020-05-26 ENCOUNTER — TELEPHONE (OUTPATIENT)
Dept: INTERNAL MEDICINE | Age: 75
End: 2020-05-26

## 2020-06-08 ENCOUNTER — OFFICE VISIT (OUTPATIENT)
Dept: OTOLARYNGOLOGY | Facility: CLINIC | Age: 75
End: 2020-06-08

## 2020-06-08 DIAGNOSIS — H61.23 BILATERAL IMPACTED CERUMEN: ICD-10-CM

## 2020-06-08 DIAGNOSIS — H72.93 PERFORATION OF BOTH TYMPANIC MEMBRANES: ICD-10-CM

## 2020-06-08 DIAGNOSIS — H70.12 CHRONIC MASTOIDITIS OF LEFT SIDE: Primary | ICD-10-CM

## 2020-06-08 PROCEDURE — 69222 CLEAN OUT MASTOID CAVITY: CPT | Performed by: PHYSICIAN ASSISTANT

## 2020-06-10 NOTE — PATIENT INSTRUCTIONS
Cerumen removed from right ear and left debridement performed and placed gentian violet. Keep follow-up appointment in July. If symptoms worsen call for sooner appointment.    For more information:  [employer] [place]  1-800-QUIT-NOW  [email or web address]

## 2020-06-10 NOTE — PROGRESS NOTES
Procedure Note    Pre-operative Diagnosis: Cerumen and debris accumulation s/p modified radical mastoidectomy and cerumen impaction of right ear    Post-operative Diagnosis: same    Anesthesia: none    Procedure:  Binocular ear microscopy with debridement of left mastoid cavity and removal of cerumen impaction of right ear    Procedure Details:    The patient was placed supine on the procedure table. Using a speculum, the ear was examined with a microscope. The mastoid cavity was debrided from the left ear and cerumen removed from both ears    Findings: moderate amount of thick, moist cerumen with moderate amount of thick mucoid drainage removed from left ear. Granulation tissue noted in left ear and gentian violet placed. Cerumen removed from right ear. Bilateral TM perforations noted.    Condition:  Stable.  Patient tolerated procedure well.    Complications:  None

## 2020-06-12 RX ORDER — CLOPIDOGREL BISULFATE 75 MG/1
TABLET ORAL
Qty: 90 TABLET | Refills: 0 | Status: SHIPPED | OUTPATIENT
Start: 2020-06-12 | End: 2020-07-02 | Stop reason: SDUPTHER

## 2020-06-15 ENCOUNTER — OFFICE VISIT (OUTPATIENT)
Dept: OTOLARYNGOLOGY | Facility: CLINIC | Age: 75
End: 2020-06-15

## 2020-06-15 VITALS
DIASTOLIC BLOOD PRESSURE: 71 MMHG | BODY MASS INDEX: 27.85 KG/M2 | TEMPERATURE: 97.5 F | WEIGHT: 205.6 LBS | HEIGHT: 72 IN | SYSTOLIC BLOOD PRESSURE: 151 MMHG | HEART RATE: 71 BPM

## 2020-06-15 DIAGNOSIS — H70.12 CHRONIC MASTOIDITIS OF LEFT SIDE: ICD-10-CM

## 2020-06-15 DIAGNOSIS — H60.393 OTHER INFECTIVE ACUTE OTITIS EXTERNA OF BOTH EARS: ICD-10-CM

## 2020-06-15 DIAGNOSIS — H72.93 PERFORATION OF BOTH TYMPANIC MEMBRANES: Primary | ICD-10-CM

## 2020-06-15 PROBLEM — H60.90 OTITIS EXTERNA: Status: ACTIVE | Noted: 2020-06-15

## 2020-06-15 PROCEDURE — 99024 POSTOP FOLLOW-UP VISIT: CPT | Performed by: PHYSICIAN ASSISTANT

## 2020-06-15 RX ORDER — AZITHROMYCIN 250 MG/1
TABLET, FILM COATED ORAL
Qty: 6 TABLET | Refills: 0 | Status: SHIPPED | OUTPATIENT
Start: 2020-06-15 | End: 2020-07-31

## 2020-06-15 RX ORDER — METHYLPREDNISOLONE 4 MG/1
TABLET ORAL
Qty: 1 EACH | Refills: 0 | Status: SHIPPED | OUTPATIENT
Start: 2020-06-15

## 2020-06-15 NOTE — PATIENT INSTRUCTIONS
Will continue ACHS powder and start zpac and medrol pack. Follow-up in July as previously directed.

## 2020-06-15 NOTE — PROGRESS NOTES
SHON Rodriguez     Chief Complaint   Patient presents with   • Ear Drainage   • Earache        HISTORY OF PRESENT ILLNESS:     Prashanth Cantu is a  75 y.o.  male who is here for follow up. He has had continued problems with otalgia, ear pressure and otorrhea. The symptoms are localized to both ears. The symptoms severity was described as: moderate The symptoms have been: relatively constant for the last several weeks The symptoms are aggravated by  no identifiable factors. The symptoms are improved by EYAL ortiz. He denies  neither spells of vertigo or imbalance nor nasal congestion, drainage, facial swelling, facial pain or sinusitis nor throat pain, feeling of something in the throat, voice change or trouble swallowing.    Review of Systems   Constitutional: Negative for activity change, appetite change, chills, diaphoresis, fatigue, fever and unexpected weight change.   HENT: Positive for ear discharge and ear pain. Negative for congestion, facial swelling, hearing loss, mouth sores, nosebleeds, postnasal drip, rhinorrhea, sinus pressure, sneezing, sore throat, tinnitus, trouble swallowing and voice change.    Eyes: Negative for pain, discharge, redness, itching and visual disturbance.   Respiratory: Negative for apnea, cough, choking, chest tightness, shortness of breath, wheezing and stridor.    Gastrointestinal: Negative for nausea and vomiting.   Endocrine: Negative for cold intolerance and heat intolerance.   Musculoskeletal: Negative for arthralgias, back pain, gait problem, neck pain and neck stiffness.   Skin: Negative for rash.   Allergic/Immunologic: Negative for environmental allergies and food allergies.   Neurological: Negative for dizziness, tremors, seizures, syncope, facial asymmetry, speech difficulty, weakness, light-headedness, numbness and headaches.   Hematological: Negative for adenopathy. Does not bruise/bleed easily.   Psychiatric/Behavioral: Negative for behavioral  problems, sleep disturbance and suicidal ideas. The patient is not nervous/anxious and is not hyperactive.    :    Past History:  Past Medical History:   Diagnosis Date   • Cholesteatoma    • Chronic mastoiditis    • Chronic otitis externa    • Chronic otitis media    • Elevated cholesterol    • Eustachian tube dysfunction    • Hearing loss    • Heart disease    • Hx of colonic polyp    • Hx of myringotomy    • Hypertension    • Impacted cerumen    • Mastoiditis    • MI, old    • Polyp of right middle ear     granulation polyp of righttympanic membrane   • Tympanic membrane perforation      Past Surgical History:   Procedure Laterality Date   • COLONOSCOPY N/A 1/22/2019    Procedure: COLONOSCOPY WITH ANESTHESIA;  Surgeon: Dilip Vasquez MD;  Location: [facility] ENDOSCOPY;  Service: Gastroenterology   • COLONOSCOPY W/ POLYPECTOMY  12/30/2013    Tubular adenomatous polyp at 20 cm repeat exam in 5 years   • CORONARY ANGIOPLASTY WITH STENT PLACEMENT     • EAR TUBES Right    • SHOULDER SURGERY     • TYMPANOMASTOIDECTOMY  02/2015    left  Curtis     Family History   Problem Relation Age of Onset   • Breast cancer Mother    • Colon cancer Neg Hx    • Colon polyps Neg Hx      Social History     Tobacco Use   • Smoking status: Current Every Day Smoker     Packs/day: 1.00     Types: Cigarettes     Start date: 4/3/1957   • Smokeless tobacco: Never Used   Substance Use Topics   • Alcohol use: No   • Drug use: Never     Outpatient Medications Marked as Taking for the 6/15/20 encounter (Office Visit) with Ramon Oseguera PA   Medication Sig Dispense Refill   • aspirin 81 MG chewable tablet Chew 81 mg Daily.     • azelastine (ASTELIN) 0.1 % nasal spray 2 sprays into the nostril(s) as directed by provider 2 (Two) Times a Day. 1 each 11   • clopidogrel (PLAVIX) 75 MG tablet Take 75 mg by mouth Daily.     • fluticasone (FLONASE) 50 MCG/ACT nasal spray 2 sprays into the nostril(s) as directed by provider Daily. 1 bottle 11   •  fluticasone-salmeterol (ADVAIR) 100-50 MCG/DOSE DISKUS Inhale 2 (Two) Times a Day.     • gabapentin (NEURONTIN) 300 MG capsule Take 1 capsule by mouth.     • ibuprofen (ADVIL,MOTRIN) 800 MG tablet Take 800 mg by mouth.     • ipratropium (ATROVENT) 0.06 % nasal spray 2 sprays into each nostril 3 (Three) Times a Day As Needed for rhinitis (FOR RUNNY NOSE). 15 mL 11   • lisinopril (PRINIVIL,ZESTRIL) 2.5 MG tablet Take 2.5 mg by mouth Daily.     • lovastatin (MEVACOR) 20 MG tablet Take 20 mg by mouth Every Night.  3   • nitroglycerin (NITROSTAT) 0.4 MG SL tablet Place 0.4 mg under the tongue.     • oxyCODONE-acetaminophen (PERCOCET) 7.5-325 MG per tablet      • sildenafil (VIAGRA) 100 MG tablet Take 100 mg by mouth.     • tiZANidine (ZANAFLEX) 4 MG tablet Take 4 mg by mouth.       Allergies:  Patient has no known allergies.          Vital Signs:   Vitals:    06/15/20 1404   BP: 151/71   Pulse: 71   Temp: 97.5 °F (36.4 °C)         EXAMINATION:   CONSTITUTIONAL: well nourished, alert, oriented, in no acute distress     COMMUNICATION AND VOICE: able to communicate normally, normal voice quality    HEAD: normocephalic, no lesions, atraumatic, no tenderness, no masses     FACE: appearance normal, no lesions, no tenderness, no deformities, facial motion symmetric    EYES: ocular motility normal, eyelids normal, orbits normal, no proptosis, conjunctiva normal , pupils equal, round     EARS:  Hearing: response to conversational voice normal bilaterally   External Ears: auricles without lesions  Otoscopic: right tympanic membrane with 5% dry central perforation and mild inflammation of the external auditory canal the left tympanic membrane with mild granulation and drainage noted    NOSE:  External Nose: structure normal, no tenderness on palpation, no nasal discharge, no lesions, no evidence of trauma, nostrils patent     ORAL:  Lips: upper and lower lips without lesion     NECK: neck appearance normal    CHEST/RESPIRATORY:  respiratory effort normal, normal breath sounds     CARDIOVASCULAR: rate and rhythm normal, extremities without cyanosis or edema      NEUROLOGIC/PSYCHIATRIC: oriented to time, place and person, mood normal, affect appropriate, CN II-XII intact grossly    RESULTS REVIEW:    I have reviewed the patients old records in the chart.       Assessment    Diagnosis Plan   1. Perforation of both tympanic membranes     2. Chronic mastoiditis of left side  azithromycin (ZITHROMAX) 250 MG tablet    methylPREDNISolone (Medrol) 4 MG tablet   3. Other infective acute otitis externa of both ears  Misc. Devices (POWDER INSUFFLATOR) misc       Plan    Patient Instructions   Will continue ACHS powder and start zpac and medrol pack. Follow-up in July as previously directed.    New Medications Ordered This Visit   Medications   • azithromycin (ZITHROMAX) 250 MG tablet     Sig: Take 2 tabs by mouth today then 1 tab by mouth daily for 4 days     Dispense:  6 tablet     Refill:  0   • Misc. Devices (POWDER INSUFFLATOR) misc     Sig: Administer 1 puff into ear(s) as directed by provider 2 (Two) Times a Day for 10 days.     Dispense:  10 each     Refill:  1     Please compound: Amphoptericin B powder 25mg, Sulfanilamide 0.25g, Chloramphenicol 0.2g, Hydrocortisone 5mg, Cornstarch 0.25g, makes 5 capsules 160 mg each, dispense with insufflator   • methylPREDNISolone (Medrol) 4 MG tablet     Sig: Take as directed on package instructions.     Dispense:  1 each     Refill:  0             Return for Recheck in July with existing appointment.    SHON Rodriguez  06/15/20  14:20

## 2020-06-24 DIAGNOSIS — E55.9 VITAMIN D DEFICIENCY: ICD-10-CM

## 2020-06-24 DIAGNOSIS — N39.0 URINARY TRACT INFECTION WITHOUT HEMATURIA, SITE UNSPECIFIED: ICD-10-CM

## 2020-06-24 DIAGNOSIS — I10 ESSENTIAL HYPERTENSION: ICD-10-CM

## 2020-06-24 LAB
ALBUMIN SERPL-MCNC: 4.1 G/DL (ref 3.5–5.2)
ALP BLD-CCNC: 66 U/L (ref 40–130)
ALT SERPL-CCNC: 15 U/L (ref 5–41)
ANION GAP SERPL CALCULATED.3IONS-SCNC: 12 MMOL/L (ref 7–19)
AST SERPL-CCNC: 15 U/L (ref 5–40)
BILIRUB SERPL-MCNC: 1 MG/DL (ref 0.2–1.2)
BUN BLDV-MCNC: 18 MG/DL (ref 8–23)
CALCIUM SERPL-MCNC: 9 MG/DL (ref 8.8–10.2)
CHLORIDE BLD-SCNC: 105 MMOL/L (ref 98–111)
CO2: 25 MMOL/L (ref 22–29)
CREAT SERPL-MCNC: 0.9 MG/DL (ref 0.5–1.2)
GFR NON-AFRICAN AMERICAN: >60
GLUCOSE BLD-MCNC: 84 MG/DL (ref 74–109)
HCT VFR BLD CALC: 44.2 % (ref 42–52)
HEMOGLOBIN: 14.3 G/DL (ref 14–18)
MCH RBC QN AUTO: 31.5 PG (ref 27–31)
MCHC RBC AUTO-ENTMCNC: 32.4 G/DL (ref 33–37)
MCV RBC AUTO: 97.4 FL (ref 80–94)
PDW BLD-RTO: 12.4 % (ref 11.5–14.5)
PLATELET # BLD: 244 K/UL (ref 130–400)
PMV BLD AUTO: 9.7 FL (ref 9.4–12.4)
POTASSIUM SERPL-SCNC: 4.1 MMOL/L (ref 3.5–5)
RBC # BLD: 4.54 M/UL (ref 4.7–6.1)
SODIUM BLD-SCNC: 142 MMOL/L (ref 136–145)
TOTAL PROTEIN: 6.2 G/DL (ref 6.6–8.7)
TSH SERPL DL<=0.05 MIU/L-ACNC: 2.72 UIU/ML (ref 0.27–4.2)
VITAMIN D 25-HYDROXY: 44.3 NG/ML
WBC # BLD: 5.8 K/UL (ref 4.8–10.8)

## 2020-06-26 LAB — URINE CULTURE, ROUTINE: NORMAL

## 2020-07-02 ENCOUNTER — OFFICE VISIT (OUTPATIENT)
Dept: INTERNAL MEDICINE | Age: 75
End: 2020-07-02
Payer: MEDICARE

## 2020-07-02 VITALS
BODY MASS INDEX: 27.36 KG/M2 | HEIGHT: 72 IN | HEART RATE: 79 BPM | SYSTOLIC BLOOD PRESSURE: 124 MMHG | WEIGHT: 202 LBS | DIASTOLIC BLOOD PRESSURE: 80 MMHG

## 2020-07-02 PROBLEM — E55.9 VITAMIN D DEFICIENCY: Status: ACTIVE | Noted: 2020-07-02

## 2020-07-02 PROBLEM — R41.3 MEMORY IMPAIRMENT: Status: ACTIVE | Noted: 2020-07-02

## 2020-07-02 PROCEDURE — G8417 CALC BMI ABV UP PARAM F/U: HCPCS | Performed by: NURSE PRACTITIONER

## 2020-07-02 PROCEDURE — 1123F ACP DISCUSS/DSCN MKR DOCD: CPT | Performed by: NURSE PRACTITIONER

## 2020-07-02 PROCEDURE — 4004F PT TOBACCO SCREEN RCVD TLK: CPT | Performed by: NURSE PRACTITIONER

## 2020-07-02 PROCEDURE — 99214 OFFICE O/P EST MOD 30 MIN: CPT | Performed by: NURSE PRACTITIONER

## 2020-07-02 PROCEDURE — G8427 DOCREV CUR MEDS BY ELIG CLIN: HCPCS | Performed by: NURSE PRACTITIONER

## 2020-07-02 PROCEDURE — 4040F PNEUMOC VAC/ADMIN/RCVD: CPT | Performed by: NURSE PRACTITIONER

## 2020-07-02 PROCEDURE — 4130F TOPICAL PREP RX AOE: CPT | Performed by: NURSE PRACTITIONER

## 2020-07-02 PROCEDURE — 3023F SPIROM DOC REV: CPT | Performed by: NURSE PRACTITIONER

## 2020-07-02 PROCEDURE — G8926 SPIRO NO PERF OR DOC: HCPCS | Performed by: NURSE PRACTITIONER

## 2020-07-02 RX ORDER — CLOPIDOGREL BISULFATE 75 MG/1
TABLET ORAL
Qty: 90 TABLET | Refills: 0 | Status: SHIPPED | OUTPATIENT
Start: 2020-07-02 | End: 2020-09-21

## 2020-07-02 RX ORDER — DONEPEZIL HYDROCHLORIDE 10 MG/1
10 TABLET, FILM COATED ORAL NIGHTLY
Qty: 90 TABLET | Refills: 1 | Status: SHIPPED | OUTPATIENT
Start: 2020-07-02 | End: 2020-12-09

## 2020-07-02 ASSESSMENT — ENCOUNTER SYMPTOMS
NAUSEA: 0
STRIDOR: 0
DIARRHEA: 0
WHEEZING: 0
TROUBLE SWALLOWING: 0
SHORTNESS OF BREATH: 0
ABDOMINAL DISTENTION: 0
CHOKING: 0
EYE ITCHING: 0
SORE THROAT: 0
EYE DISCHARGE: 0
ABDOMINAL PAIN: 0
COLOR CHANGE: 0
CONSTIPATION: 0
VOMITING: 0
COUGH: 0
BLOOD IN STOOL: 0

## 2020-07-02 NOTE — PATIENT INSTRUCTIONS
1.  External otitis; Resolved   2. Health maintenance; He was to have had repeat scope in jan 2020;  Please call Dr Zurdo Aiken office at \[facility]\"" to schedule your colonoscopy ;    I have ordered us aorta for aneurysm  I have ordered ct chest for lung cancer screening   Someone will call you with those appt   3. Memory impairment;  I sent new script for donizepril 10 mg to pharmacy ; you take one at bedtime   #4 COPD followed by pulmonology  5. Coronary artery disease stable with no chest pain continue Plavix  6. Chronic pain syndrome followed by Dr. Ifeoma Vickers  7. Hyperlipidemia no changes are necessary continue lovastatin  8.   Vitamin D deficiency continue with weekly dose of vitamin D

## 2020-07-02 NOTE — PROGRESS NOTES
[facility] INTERNAL MEDICINE  [address] [address]  [address] [place] [address]  Dept: [phone]  Dept Fax: [phone]  Loc: [phone]    Rodrigo Moreland is a 67 y.o. male who presents today for his medical conditions/complaints as noted below. Rodrigo Moreland is c/robinson Hypertension (Patient is here for routine follow up visit and review of labs done 6/24/2020.)        HPI:     HPI   1. External otitis; He had in both ears; Was given powder and it cleared;   2. HM; He had adenomatous polyp removed 1/19; he was to have had repeat in 1 year;  3. COPD still smokes 1 PPD is not interested in quitting; He has advair to use   4. CAD  Stable no chest pain \sontinues with plavix;    5. Memory impairment he does not think he is taking the aricept  We will refill     6. Chronic pain syndrome; He is going to pain management  Dr Anthony Miller; He gets  Oxycodone and gabapentin; He usually gets shots in his back but on hold for   COVID quarantine  7. Hyperlipidemia; He thinks he is taking the lovastatin 20 mg nightly;   8/  Vit d def; Takes the weekly vit d;  Level is 40 ; no side effects; Chief Complaint   Patient presents with    Hypertension     Patient is here for routine follow up visit and review of labs done 6/24/2020.        Past Medical History:   Diagnosis Date    Adenomatous colon polyp     CAD (coronary artery disease)     LAD stent 3/2005 [place]    COPD (chronic obstructive [facility] disease) ([facility] [address]     Hypertension     Male erectile dysfunction     Pure hypercholesterolemia     Sick sinus syndrome ([facility] [address]     Vertigo       Past Surgical History:   Procedure Laterality Date    MASTOIDECTOMY      left ear 3/15 tympanomastoidectomy, Dr Jose Reynolds, [facility]       Vitals 7/2/2020 1/6/2020 11/19/2019 7/10/2019 12/26/2018 0/9/1479   SYSTOLIC [id number] [phone]   DIASTOLIC 80 85 78 62 78 80   Pulse 79 100 74 74 65 69   Temp - - - - - 97.6   Resp - - 18 18 16 - SpO2 - 97 98 97 97 97   Weight 202 lb 201 lb - 200 lb 211 lb 209 lb 12.8 oz   Height 6' 0\" 6' 0\" - 6' 0\" 6' 0\" 6' 0\"   BMI (wt*703/ht~2) 27.39 kg/m2 27.26 kg/m2 - 27.12 kg/m2 28.61 kg/m2 28.45 kg/m2       Family History   Problem Relation Age of Onset    Breast Cancer Mother     Emphysema Father        Social History     Tobacco Use    Smoking status: Current Every Day Smoker     Packs/day: 1.00     Years: 30.00     Pack years: 30.00     Types: Cigarettes    Smokeless tobacco: Never Used   Substance Use Topics    Alcohol use: No      Current Outpatient Medications   Medication Sig Dispense Refill    clopidogrel (PLAVIX) 75 MG tablet Take 1 tablet by mouth once daily 90 tablet 0    ADVAIR DISKUS 250-50 MCG/DOSE AEPB INHALE ONE DOSE BY MOUTH TWICE DAILY 3 Inhaler 3    donepezil (ARICEPT) 10 MG tablet Take 1 tablet by mouth nightly 90 tablet 1    lovastatin (MEVACOR) 20 MG tablet Take 1 tablet by mouth nightly 90 tablet 3    lisinopril (PRINIVIL;ZESTRIL) 2.5 MG tablet Take 1 tablet by mouth daily 90 tablet 3    vitamin D (ERGOCALCIFEROL) 1.25 MG (27554 UT) CAPS capsule Take 1 capsule by mouth once a week 30 capsule 3    nitroGLYCERIN (NITROSTAT) 0.4 MG SL tablet Place 1 tablet under the tongue every 5 minutes as needed for Chest pain up to max of 3 total doses.  If no relief after 1 dose, call 911. 25 tablet 1    gabapentin (NEURONTIN) 300 MG capsule TAKE 1 CAPSULE BY MOUTH EVERY 8 HOURS AS NEEDED  5    tiZANidine (ZANAFLEX) 4 MG tablet Take 1 tablet by mouth 3 times daily 30 tablet 5    sildenafil (VIAGRA) 100 MG tablet Take 1 tablet by mouth as needed for Erectile Dysfunction 10 tablet 1    Cyanocobalamin (VITAMIN B 12 PO) Take 1 tablet by mouth daily      aspirin 81 MG EC tablet Take 81 mg by mouth daily      oxyCODONE-acetaminophen (PERCOCET) 7.5-325 MG per tablet       fluticasone (FLONASE) 50 MCG/ACT nasal spray       ibuprofen (ADVIL;MOTRIN) 800 MG tablet Take 1 tablet by mouth 2 times daily as needed for Pain 120 tablet 1     No current facility-administered medications for this visit.       No Known Allergies    Health Maintenance   Topic Date Due    Annual Wellness Visit (AWV)  07/10/2020    Shingles Vaccine (1 of 2) 07/10/2020 (Originally 4/3/1994)    DTaP/Tdap/Td vaccine (1 - Tdap) 07/23/2020 (Originally 4/3/1964)    Low dose CT lung screening  07/22/2020    Flu vaccine (1) 09/01/2020    Lipid screen  12/30/2020    Potassium monitoring  06/24/2021    Creatinine monitoring  06/24/2021    Pneumococcal 65+ years Vaccine  Completed    Hepatitis A vaccine  Aged Out    Hepatitis B vaccine  Aged Out    Hib vaccine  Aged Out    Meningococcal (ACWY) vaccine  Aged Out       No results found for: LABA1C  Lab Results   Component Value Date    PSA 1.71 12/30/2019    PSA 1.49 12/26/2018    PSA 1.16 12/13/2017     TSH   Date Value Ref Range Status   06/24/2020 2.720 0.270 - 4.200 uIU/mL Final   ]  Lab Results   Component Value Date     06/24/2020    K 4.1 06/24/2020     06/24/2020    CO2 25 06/24/2020    BUN 18 06/24/2020    CREATININE 0.9 06/24/2020    GLUCOSE 84 06/24/2020    CALCIUM 9.0 06/24/2020    PROT 6.2 (L) 06/24/2020    LABALBU 4.1 06/24/2020    BILITOT 1.0 06/24/2020    ALKPHOS 66 06/24/2020    AST 15 06/24/2020    ALT 15 06/24/2020    LABGLOM >60 06/24/2020     Lab Results   Component Value Date    CHOL 122 (L) 12/30/2019    CHOL 109 (L) 07/05/2019    CHOL 121 (L) 12/26/2018     Lab Results   Component Value Date    TRIG 92 12/30/2019    TRIG 118 07/05/2019    TRIG 137 12/26/2018     Lab Results   Component Value Date    HDL 42 (L) 12/30/2019    HDL 37 (L) 07/05/2019    HDL 39 (L) 12/26/2018     Lab Results   Component Value Date    LDLCALC 62 12/30/2019    LDLCALC 48 07/05/2019    LDLCALC 55 12/26/2018     Lab Results   Component Value Date     06/24/2020    K 4.1 06/24/2020     06/24/2020    CO2 25 06/24/2020    BUN 18 06/24/2020    CREATININE 0.9 06/24/2020 of motion and neck supple. Thyroid: No thyromegaly. Vascular: No JVD. Cardiovascular:      Rate and Rhythm: Normal rate and regular rhythm. Heart sounds: Normal heart sounds. No murmur. Pulmonary:      Effort: Pulmonary effort is normal. No respiratory distress. Breath sounds: Normal breath sounds. No wheezing or rales. Abdominal:      General: Bowel sounds are normal. There is no distension. Palpations: Abdomen is soft. There is no mass. Tenderness: There is no abdominal tenderness. There is no guarding or rebound. Musculoskeletal: Normal range of motion. General: No tenderness. Skin:     General: Skin is warm and dry. Findings: No erythema or rash. Neurological:      Mental Status: He is alert and oriented to person, place, and time. Cranial Nerves: No cranial nerve deficit. Coordination: Coordination normal.      Deep Tendon Reflexes: Reflexes are normal and symmetric. Reflexes normal.   Psychiatric:         Mood and Affect: Mood is not depressed. Behavior: Behavior normal.         Thought Content: Thought content normal.         Judgment: Judgment normal.       /80   Pulse 79   Ht 6' (1.829 m)   Wt 202 lb (91.6 kg)   BMI 27.40 kg/m²     Assessment:       Diagnosis Orders   1. Screening for AAA (abdominal aortic aneurysm)  US ABDOMINAL AORTA LIMITED   2. Encounter for screening for lung cancer  CT CHEST W WO CONTRAST   3. Acute otitis externa of both ears, unspecified type     4. Healthcare maintenance     5. Chronic obstructive pulmonary disease, unspecified COPD type ([facility] [address]     6. Coronary artery disease involving native coronary artery of native heart without angina pectoris     7. Memory impairment     8. Chronic pain syndrome     9. Pure hypercholesterolemia     10.  Vitamin D deficiency       Labs reviewedfrom 6/24/2020  Diagnostics reviewed from 2019  Ct and u/s   Plan:        Patient given educational materials - see patient instructions. Discussed use, benefit, and side effects of prescribed medications. Allpatient questions answered. Pt voiced understanding. Reviewed health maintenance. Instructed to continue current medications, diet and exercise. Patient agreed with treatment plan. Follow up as directed. MEDICATIONS:  Orders Placed This Encounter   Medications    clopidogrel (PLAVIX) 75 MG tablet     Sig: Take 1 tablet by mouth once daily     Dispense:  90 tablet     Refill:  0    ADVAIR DISKUS 250-50 MCG/DOSE AEPB     Sig: INHALE ONE DOSE BY MOUTH TWICE DAILY     Dispense:  3 Inhaler     Refill:  3     Please consider 90 day supplies to promote better adherence    donepezil (ARICEPT) 10 MG tablet     Sig: Take 1 tablet by mouth nightly     Dispense:  90 tablet     Refill:  1         ORDERS:  No orders of the defined types were placed in this encounter. Follow-up:  Return in about 6 months (around 1/2/2021) for have labs done prior to appt. PATIENT INSTRUCTIONS:  Patient Instructions   1. External otitis; Resolved   2. Health maintenance; He was to have had repeat scope in jan 2020;  Please call Dr Dawkins Ask office at \[facility]\"" to schedule your colonoscopy ;    I have ordered us aorta for aneurysm  I have ordered ct chest for lung cancer screening   Someone will call you with those appt   2. Memory impairment;  I sent new script for donizepril 10 mg to pharmacy ; you take one at bedtime       Electronically signed by ALLYN Soriano on 7/2/2020 at 11:03 AM    EMRDragon/transcription disclaimer:  Much of this encounter note is electronic transcription/translation of spoken language to printed texts. The electronic translation of spoken language may be erroneous, or at times,nonsensical words or phrases may be inadvertently transcribed.   Although I have reviewed the note for such errors, some may still exist.

## 2020-07-22 ENCOUNTER — HOSPITAL ENCOUNTER (OUTPATIENT)
Dept: ULTRASOUND IMAGING | Age: 75
Discharge: HOME | End: 2020-07-22
Payer: MEDICARE

## 2020-07-22 ENCOUNTER — HOSPITAL ENCOUNTER (OUTPATIENT)
Dept: CT IMAGING | Age: 75
Discharge: HOME | End: 2020-07-22
Payer: MEDICARE

## 2020-07-22 PROCEDURE — 76775 US EXAM ABDO BACK WALL LIM: CPT

## 2020-07-22 PROCEDURE — G0297 LDCT FOR LUNG CA SCREEN: HCPCS

## 2020-07-31 ENCOUNTER — OFFICE VISIT (OUTPATIENT)
Dept: OTOLARYNGOLOGY | Facility: CLINIC | Age: 75
End: 2020-07-31

## 2020-07-31 VITALS
DIASTOLIC BLOOD PRESSURE: 77 MMHG | WEIGHT: 205.6 LBS | BODY MASS INDEX: 27.85 KG/M2 | HEART RATE: 78 BPM | SYSTOLIC BLOOD PRESSURE: 142 MMHG | TEMPERATURE: 97.8 F | HEIGHT: 72 IN

## 2020-07-31 DIAGNOSIS — H70.12 CHRONIC MASTOIDITIS OF LEFT SIDE: Primary | ICD-10-CM

## 2020-07-31 DIAGNOSIS — H72.93 PERFORATION OF BOTH TYMPANIC MEMBRANES: ICD-10-CM

## 2020-07-31 PROCEDURE — 99213 OFFICE O/P EST LOW 20 MIN: CPT | Performed by: PHYSICIAN ASSISTANT

## 2020-07-31 RX ORDER — ERGOCALCIFEROL 1.25 MG/1
50000 CAPSULE ORAL WEEKLY
COMMUNITY
Start: 2020-07-20

## 2020-07-31 RX ORDER — DONEPEZIL HYDROCHLORIDE 10 MG/1
10 TABLET, FILM COATED ORAL
COMMUNITY
Start: 2020-07-02

## 2020-08-16 NOTE — PROGRESS NOTES
SHON Rodriguez     Chief Complaint   Patient presents with   • Follow-up        HISTORY OF PRESENT ILLNESS:     Prashanth Cantu is a  75 y.o.  male who is here for follow up. He has had greatly improved problems with otalgia, ear pressure and otorrhea. The symptoms are localized to both ears. The symptoms severity was described as: greatly improved. The symptoms have been: decreasing in amount for the last several weeks. The symptoms are aggravated by  no identifiable factors. The symptoms are improved by EYAL ortiz. He denies  neither spells of vertigo or imbalance nor nasal congestion, drainage, facial swelling, facial pain or sinusitis nor throat pain, feeling of something in the throat, voice change or trouble swallowing.    Review of Systems   Constitutional: Negative for activity change, appetite change, chills, diaphoresis, fatigue, fever and unexpected weight change.   HENT: Positive for ear discharge. Negative for congestion, ear pain, facial swelling, hearing loss, mouth sores, nosebleeds, postnasal drip, rhinorrhea, sinus pressure, sneezing, sore throat, tinnitus, trouble swallowing and voice change.    Eyes: Negative for pain, discharge, redness, itching and visual disturbance.   Respiratory: Negative for apnea, cough, choking, chest tightness, shortness of breath, wheezing and stridor.    Gastrointestinal: Negative for nausea and vomiting.   Endocrine: Negative for cold intolerance and heat intolerance.   Musculoskeletal: Negative for arthralgias, back pain, gait problem, neck pain and neck stiffness.   Skin: Negative for rash.   Allergic/Immunologic: Negative for environmental allergies and food allergies.   Neurological: Negative for dizziness, tremors, seizures, syncope, facial asymmetry, speech difficulty, weakness, light-headedness, numbness and headaches.   Hematological: Negative for adenopathy. Does not bruise/bleed easily.   Psychiatric/Behavioral: Negative for behavioral  problems, sleep disturbance and suicidal ideas. The patient is not nervous/anxious and is not hyperactive.    :    Past History:  Past Medical History:   Diagnosis Date   • Cholesteatoma    • Chronic mastoiditis    • Chronic otitis externa    • Chronic otitis media    • Elevated cholesterol    • Eustachian tube dysfunction    • Hearing loss    • Heart disease    • Hx of colonic polyp    • Hx of myringotomy    • Hypertension    • Impacted cerumen    • Mastoiditis    • MI, old    • Polyp of right middle ear     granulation polyp of righttympanic membrane   • Tympanic membrane perforation      Past Surgical History:   Procedure Laterality Date   • COLONOSCOPY N/A 1/22/2019    Procedure: COLONOSCOPY WITH ANESTHESIA;  Surgeon: Dilip Vasquez MD;  Location: [facility] ENDOSCOPY;  Service: Gastroenterology   • COLONOSCOPY W/ POLYPECTOMY  12/30/2013    Tubular adenomatous polyp at 20 cm repeat exam in 5 years   • CORONARY ANGIOPLASTY WITH STENT PLACEMENT     • EAR TUBES Right    • SHOULDER SURGERY     • TYMPANOMASTOIDECTOMY  02/2015    left  Curtis     Family History   Problem Relation Age of Onset   • Breast cancer Mother    • Colon cancer Neg Hx    • Colon polyps Neg Hx      Social History     Tobacco Use   • Smoking status: Current Every Day Smoker     Packs/day: 1.00     Types: Cigarettes     Start date: 4/3/1957   • Smokeless tobacco: Never Used   Substance Use Topics   • Alcohol use: No   • Drug use: Never     Outpatient Medications Marked as Taking for the 7/31/20 encounter (Office Visit) with Ramon Oseguera PA   Medication Sig Dispense Refill   • aspirin 81 MG chewable tablet Chew 81 mg Daily.     • azelastine (ASTELIN) 0.1 % nasal spray 2 sprays into the nostril(s) as directed by provider 2 (Two) Times a Day. 1 each 11   • clopidogrel (PLAVIX) 75 MG tablet Take 75 mg by mouth Daily.     • donepezil (ARICEPT) 10 MG tablet Take 10 mg by mouth.     • fluticasone (FLONASE) 50 MCG/ACT nasal spray 2 sprays into the  nostril(s) as directed by provider Daily. 1 bottle 11   • fluticasone-salmeterol (ADVAIR) 100-50 MCG/DOSE DISKUS Inhale 2 (Two) Times a Day.     • gabapentin (NEURONTIN) 300 MG capsule Take 1 capsule by mouth.     • ibuprofen (ADVIL,MOTRIN) 800 MG tablet Take 800 mg by mouth.     • ipratropium (ATROVENT) 0.06 % nasal spray 2 sprays into each nostril 3 (Three) Times a Day As Needed for rhinitis (FOR RUNNY NOSE). 15 mL 11   • lisinopril (PRINIVIL,ZESTRIL) 2.5 MG tablet Take 2.5 mg by mouth Daily.     • lovastatin (MEVACOR) 20 MG tablet Take 20 mg by mouth Every Night.  3   • methylPREDNISolone (Medrol) 4 MG tablet Take as directed on package instructions. 1 each 0   • nitroglycerin (NITROSTAT) 0.4 MG SL tablet Place 0.4 mg under the tongue.     • oxyCODONE-acetaminophen (PERCOCET) 7.5-325 MG per tablet      • sildenafil (VIAGRA) 100 MG tablet Take 100 mg by mouth.     • tiZANidine (ZANAFLEX) 4 MG tablet Take 4 mg by mouth.     • vitamin D (ERGOCALCIFEROL) 1.25 MG (68853 UT) capsule capsule Take 50,000 Units by mouth 1 (One) Time Per Week.       Allergies:  Patient has no known allergies.          Vital Signs:   Vitals:    07/31/20 1030   BP: 142/77   Pulse: 78   Temp: 97.8 °F (36.6 °C)         EXAMINATION:   CONSTITUTIONAL: well nourished, alert, oriented, in no acute distress     COMMUNICATION AND VOICE: able to communicate normally, normal voice quality    HEAD: normocephalic, no lesions, atraumatic, no tenderness, no masses     FACE: appearance normal, no lesions, no tenderness, no deformities, facial motion symmetric    EYES: ocular motility normal, eyelids normal, orbits normal, no proptosis, conjunctiva normal , pupils equal, round     EARS:  Hearing: response to conversational voice normal bilaterally   External Ears: auricles without lesions  Otoscopic: right tympanic membrane with 5% dry central perforation and left tympanic membrane with with 5% perforation and very mild clear drainage    NOSE:  External  Nose: structure normal, no tenderness on palpation, no nasal discharge, no lesions, no evidence of trauma, nostrils patent     ORAL:  Lips: upper and lower lips without lesion     NECK: neck appearance normal    CHEST/RESPIRATORY: respiratory effort normal, normal breath sounds     CARDIOVASCULAR: rate and rhythm normal, extremities without cyanosis or edema      NEUROLOGIC/PSYCHIATRIC: oriented to time, place and person, mood normal, affect appropriate, CN II-XII intact grossly    RESULTS REVIEW:    I have reviewed the patients old records in the chart.       Assessment    Diagnosis Plan   1. Chronic mastoiditis of left side  Misc. Devices (POWDER INSUFFLATOR) misc   2. Perforation of both tympanic membranes  Misc. Devices (POWDER INSUFFLATOR) misc       Plan    Patient Instructions   Will start an extended course of ACHS powder and recheck in 6 weeks.    For more information:  Quit Now [place]  1-800-QUIT-NOW  [email or web address]-[email or web address]           New Medications Ordered This Visit   Medications   • Misc. Devices (POWDER INSUFFLATOR) misc     Sig: Administer 1 puff into ear(s) as directed by provider 2 (Two) Times a Day for 20 days.     Dispense:  20 each     Refill:  1     Please compound: Amphoptericin B powder 25mg, Sulfanilamide 0.25g, Chloramphenicol 0.2g, Hydrocortisone 5mg, Cornstarch 0.25g, makes 5 capsules 160 mg each, dispense with insufflator             Return in about 6 weeks (around 9/11/2020) for Recheck ears.    SHON Rodriguez  08/16/20  15:40

## 2020-09-04 ENCOUNTER — OFFICE VISIT (OUTPATIENT)
Dept: OTOLARYNGOLOGY | Facility: CLINIC | Age: 75
End: 2020-09-04

## 2020-09-04 VITALS
HEART RATE: 77 BPM | DIASTOLIC BLOOD PRESSURE: 72 MMHG | WEIGHT: 205 LBS | BODY MASS INDEX: 27.77 KG/M2 | HEIGHT: 72 IN | SYSTOLIC BLOOD PRESSURE: 148 MMHG | TEMPERATURE: 97.5 F

## 2020-09-04 DIAGNOSIS — H70.12 CHRONIC MASTOIDITIS OF LEFT SIDE: ICD-10-CM

## 2020-09-04 DIAGNOSIS — H72.93 PERFORATION OF BOTH TYMPANIC MEMBRANES: Primary | ICD-10-CM

## 2020-09-04 DIAGNOSIS — H92.11 OTORRHEA OF RIGHT EAR: ICD-10-CM

## 2020-09-04 DIAGNOSIS — H74.41 GRANULATION POLYP OF RIGHT MIDDLE EAR: ICD-10-CM

## 2020-09-04 PROCEDURE — 99214 OFFICE O/P EST MOD 30 MIN: CPT | Performed by: PHYSICIAN ASSISTANT

## 2020-09-04 NOTE — PROGRESS NOTES
SHON Rodriguez     Chief Complaint   Patient presents with   • Follow-up        HISTORY OF PRESENT ILLNESS:     Prashanth Cantu is a  75 y.o.  male who is here for follow up. He has had greatly improved problems with otalgia, ear pressure and otorrhea. The symptoms are localized to both ears. The symptoms severity was described as: greatly improved, but continued. The symptoms have been: decreasing in amount for the last several weeks. The symptoms are aggravated by  no identifiable factors. The symptoms are improved by [employer] poweder and ear cleaning. He denies  neither spells of vertigo or imbalance nor nasal congestion, drainage, facial swelling, facial pain or sinusitis nor throat pain, feeling of something in the throat, voice change or trouble swallowing.    Review of Systems   Constitutional: Negative for activity change, appetite change, chills, diaphoresis, fatigue, fever and unexpected weight change.   HENT: Positive for ear discharge. Negative for congestion, ear pain, facial swelling, hearing loss, mouth sores, nosebleeds, postnasal drip, rhinorrhea, sinus pressure, sneezing, sore throat, tinnitus, trouble swallowing and voice change.         Tympanic membrane perforation   Eyes: Negative for pain, discharge, redness, itching and visual disturbance.   Respiratory: Negative for apnea, cough, choking, chest tightness, shortness of breath, wheezing and stridor.    Gastrointestinal: Negative for nausea and vomiting.   Endocrine: Negative for cold intolerance and heat intolerance.   Musculoskeletal: Negative for arthralgias, back pain, gait problem, neck pain and neck stiffness.   Skin: Negative for rash.   Allergic/Immunologic: Negative for environmental allergies and food allergies.   Neurological: Negative for dizziness, tremors, seizures, syncope, facial asymmetry, speech difficulty, weakness, light-headedness, numbness and headaches.   Hematological: Negative for adenopathy. Does not  bruise/bleed easily.   Psychiatric/Behavioral: Negative for behavioral problems, sleep disturbance and suicidal ideas. The patient is not nervous/anxious and is not hyperactive.    :    Past History:  Past Medical History:   Diagnosis Date   • Cholesteatoma    • Chronic mastoiditis    • Chronic otitis externa    • Chronic otitis media    • Elevated cholesterol    • Eustachian tube dysfunction    • Hearing loss    • Heart disease    • Hx of colonic polyp    • Hx of myringotomy    • Hypertension    • Impacted cerumen    • Mastoiditis    • MI, old    • Polyp of right middle ear     granulation polyp of righttympanic membrane   • Tympanic membrane perforation      Past Surgical History:   Procedure Laterality Date   • COLONOSCOPY N/A 1/22/2019    Procedure: COLONOSCOPY WITH ANESTHESIA;  Surgeon: Dilip Vasquez MD;  Location: [facility] ENDOSCOPY;  Service: Gastroenterology   • COLONOSCOPY W/ POLYPECTOMY  12/30/2013    Tubular adenomatous polyp at 20 cm repeat exam in 5 years   • CORONARY ANGIOPLASTY WITH STENT PLACEMENT     • EAR TUBES Right    • SHOULDER SURGERY     • TYMPANOMASTOIDECTOMY  02/2015    left  Curtis     Family History   Problem Relation Age of Onset   • Breast cancer Mother    • Colon cancer Neg Hx    • Colon polyps Neg Hx      Social History     Tobacco Use   • Smoking status: Current Every Day Smoker     Packs/day: 1.00     Types: Cigarettes     Start date: 4/3/1957   • Smokeless tobacco: Never Used   Substance Use Topics   • Alcohol use: No   • Drug use: Never     Outpatient Medications Marked as Taking for the 9/4/20 encounter (Office Visit) with Ramon Oseguera PA   Medication Sig Dispense Refill   • aspirin 81 MG chewable tablet Chew 81 mg Daily.     • azelastine (ASTELIN) 0.1 % nasal spray 2 sprays into the nostril(s) as directed by provider 2 (Two) Times a Day. 1 each 11   • clopidogrel (PLAVIX) 75 MG tablet Take 75 mg by mouth Daily.     • donepezil (ARICEPT) 10 MG tablet Take 10 mg by  mouth.     • fluticasone (FLONASE) 50 MCG/ACT nasal spray 2 sprays into the nostril(s) as directed by provider Daily. 1 bottle 11   • fluticasone-salmeterol (ADVAIR) 100-50 MCG/DOSE DISKUS Inhale 2 (Two) Times a Day.     • gabapentin (NEURONTIN) 300 MG capsule Take 1 capsule by mouth.     • ibuprofen (ADVIL,MOTRIN) 800 MG tablet Take 800 mg by mouth.     • ipratropium (ATROVENT) 0.06 % nasal spray 2 sprays into each nostril 3 (Three) Times a Day As Needed for rhinitis (FOR RUNNY NOSE). 15 mL 11   • lisinopril (PRINIVIL,ZESTRIL) 2.5 MG tablet Take 2.5 mg by mouth Daily.     • lovastatin (MEVACOR) 20 MG tablet Take 20 mg by mouth Every Night.  3   • methylPREDNISolone (Medrol) 4 MG tablet Take as directed on package instructions. 1 each 0   • nitroglycerin (NITROSTAT) 0.4 MG SL tablet Place 0.4 mg under the tongue.     • oxyCODONE-acetaminophen (PERCOCET) 7.5-325 MG per tablet      • sildenafil (VIAGRA) 100 MG tablet Take 100 mg by mouth.     • tiZANidine (ZANAFLEX) 4 MG tablet Take 4 mg by mouth.     • vitamin D (ERGOCALCIFEROL) 1.25 MG (01612 UT) capsule capsule Take 50,000 Units by mouth 1 (One) Time Per Week.       Allergies:  Patient has no known allergies.          Vital Signs:   Vitals:    09/04/20 1019   BP: 148/72   Pulse: 77   Temp: 97.5 °F (36.4 °C)         EXAMINATION:   CONSTITUTIONAL: well nourished, alert, oriented, in no acute distress     COMMUNICATION AND VOICE: able to communicate normally, normal voice quality    HEAD: normocephalic, no lesions, atraumatic, no tenderness, no masses     FACE: appearance normal, no lesions, no tenderness, no deformities, facial motion symmetric    EYES: ocular motility normal, eyelids normal, orbits normal, no proptosis, conjunctiva normal , pupils equal, round     EARS:  Hearing: response to conversational voice normal bilaterally   External Ears: auricles without lesions  Otoscopic: right tympanic membrane with 5% central perforation and mild drainage with a  granulation polyp at the superior edge of the perforation and left tympanic membrane with with 5% perforation and very mild clear drainage    NOSE:  External Nose: structure normal, no tenderness on palpation, no nasal discharge, no lesions, no evidence of trauma, nostrils patent     ORAL:  Lips: upper and lower lips without lesion     NECK: neck appearance normal    CHEST/RESPIRATORY: respiratory effort normal, normal breath sounds     CARDIOVASCULAR: rate and rhythm normal, extremities without cyanosis or edema      NEUROLOGIC/PSYCHIATRIC: oriented to time, place and person, mood normal, affect appropriate, CN II-XII intact grossly    RESULTS REVIEW:    I have reviewed the patients old records in the chart.       Assessment    Diagnosis Plan   1. Perforation of both tympanic membranes  CT Temporal Bones Without Contrast   2. Otorrhea of right ear  CT Temporal Bones Without Contrast   3. Chronic mastoiditis of left side  CT Temporal Bones Without Contrast   4. Granulation polyp of right middle ear  CT Temporal Bones Without Contrast       Plan    Patient Instructions   Continue treatment as directed, will get CT scan of the temporal and have patient follow-up with Dr. Bah.    For more information:  [employer] Now [place]  1-800-QUIT-NOW  [email or web address]              Orders Placed This Encounter   Procedures   • CT Temporal Bones Without Contrast          Return in about 2 weeks (around 9/18/2020) for Recheck ears after CT with Dr. Bah.    SHON Rodriguez  09/27/20  20:55 CDT

## 2020-09-17 ENCOUNTER — HOSPITAL ENCOUNTER (OUTPATIENT)
Dept: CT IMAGING | Facility: HOSPITAL | Age: 75
Discharge: HOME | End: 2020-09-17
Admitting: PHYSICIAN ASSISTANT

## 2020-09-17 DIAGNOSIS — H92.11 OTORRHEA OF RIGHT EAR: ICD-10-CM

## 2020-09-17 DIAGNOSIS — H72.93 PERFORATION OF BOTH TYMPANIC MEMBRANES: ICD-10-CM

## 2020-09-17 PROCEDURE — 70480 CT ORBIT/EAR/FOSSA W/O DYE: CPT

## 2020-09-21 ENCOUNTER — OFFICE VISIT (OUTPATIENT)
Dept: OTOLARYNGOLOGY | Facility: CLINIC | Age: 75
End: 2020-09-21

## 2020-09-21 VITALS
WEIGHT: 202 LBS | TEMPERATURE: 97.8 F | HEART RATE: 77 BPM | HEIGHT: 72 IN | SYSTOLIC BLOOD PRESSURE: 148 MMHG | DIASTOLIC BLOOD PRESSURE: 81 MMHG | BODY MASS INDEX: 27.36 KG/M2

## 2020-09-21 DIAGNOSIS — H72.93 PERFORATION OF BOTH TYMPANIC MEMBRANES: Primary | ICD-10-CM

## 2020-09-21 DIAGNOSIS — H69.83 DYSFUNCTION OF BOTH EUSTACHIAN TUBES: ICD-10-CM

## 2020-09-21 DIAGNOSIS — H74.92 MASTOID DISORDER, LEFT: ICD-10-CM

## 2020-09-21 DIAGNOSIS — L92.9 GRANULATION TISSUE ABNORMALITY: ICD-10-CM

## 2020-09-21 DIAGNOSIS — H65.33 CHRONIC MUCOID OTITIS MEDIA OF BOTH EARS: ICD-10-CM

## 2020-09-21 DIAGNOSIS — H92.11 OTORRHEA OF RIGHT EAR: ICD-10-CM

## 2020-09-21 DIAGNOSIS — H90.6 MIXED CONDUCTIVE AND SENSORINEURAL HEARING LOSS OF BOTH EARS: ICD-10-CM

## 2020-09-21 DIAGNOSIS — H70.12 CHRONIC MASTOIDITIS OF LEFT SIDE: ICD-10-CM

## 2020-09-21 PROCEDURE — 92504 EAR MICROSCOPY EXAMINATION: CPT | Performed by: OTOLARYNGOLOGY

## 2020-09-21 PROCEDURE — 99214 OFFICE O/P EST MOD 30 MIN: CPT | Performed by: OTOLARYNGOLOGY

## 2020-09-21 RX ORDER — CLOPIDOGREL BISULFATE 75 MG/1
TABLET ORAL
Qty: 90 TABLET | Refills: 1 | Status: SHIPPED | OUTPATIENT
Start: 2020-09-21 | End: 2021-01-05 | Stop reason: SDUPTHER

## 2020-09-21 RX ORDER — PREDNISOLONE ACETATE 10 MG/ML
2 SUSPENSION/ DROPS OPHTHALMIC DAILY
Qty: 5 ML | Refills: 3 | Status: SHIPPED | OUTPATIENT
Start: 2020-09-21 | End: 2020-09-22 | Stop reason: SDUPTHER

## 2020-09-21 NOTE — PROGRESS NOTES
Adolfo Bah Jr, MD     ENT FOLLOW UP NOTE     Chief Complaint   Patient presents with   • Ear Problem     perforation of both tympanic membranes        HISTORY OF PRESENT ILLNESS:  Accompanied by:  No one  Prashanth Cantu is a  75 y.o. male who is here for follow up. He says Cortisporin dried ears. Not powder.  He says ears are dry.  He has had L sided deafness. He has had Dr Curtis Miller Tympmastoid.  Last seen in [facility] 1 yr ago.    Review of Systems   Constitutional: Negative for appetite change, fatigue and fever.   HENT:        See HPI   Respiratory: Negative for cough, choking and shortness of breath.    Gastrointestinal: Negative for diarrhea, nausea and vomiting.   Skin: Negative for rash.   Neurological: Negative for dizziness, light-headedness and headaches.   Psychiatric/Behavioral: Negative for sleep disturbance.       Past History:  Past medical and surgical history, family history and social history reviewed and updated when appropriate.  Current medications and allergies reviewed and updated when appropriate.  Allergies:  Patient has no known allergies.        Vital Signs:   Temp:  [97.8 °F (36.6 °C)] 97.8 °F (36.6 °C)  Heart Rate:  [77] 77  BP: (148)/(81) 148/81  EXAMINATION:  CONSTITUTIONAL:    well nourished, well-developed, alert, oriented, in no acute distress     BODY HABITUS:    Normal body habitus    COMMUNICATION:    able to communicate normally, normal voice quality    HEAD:     Normocephalic, without obvious abnormality, atraumatic    FACE:    structure normal, no tenderness present, no lesions/masses, no evidence of trauma    SALIVARY GLANDS:    parotid glands with no tenderness, no swelling, no masses, submandibular glands with normal size, nontender     EYE:    ocular motility normal, eyelids normal, orbits normal, no proptosis, conjunctiva clear, sclera non-icteric, pupils equal, round, reactive to light and accomodation  Color:   blue    HEARING:      response to conversational  voice decreased  Bilateral    abnormal:      AS: Worse      AD: augmented  Hearing aids present  Right    EARS:    Microscopic exam- See procedure note     Bilateral     NOSE EXTERNAL:    APPEARANCE: normal, straight, with good projection, no tenderness, no lesions, no tenderness, good nasal support, patent nares    NOSE INTERNAL:    Anterior rhinoscopy   NASALMUCOSA:    intact mucosa, no lesions    NASAL PASSAGES:      Left   normal, patent     abnormal   Right- narrowed by septum   NASAL VALVE:     intact, good support and no nasal obstruction   SEPTUM:     normal mucosa without crusting or lesions  Bilateral    deviation present:      deviated Right low and mid mild to mod   INFERIOR TURBINATES:     normal size, non-obstructing, no lesions    ORAL CAVITY:    Normal lips with no lesions, dentition normal for age, FOM intact without lesions and normal salivary flow, Mucosa intact without lesions, Hard and soft palate normal without lesions    TEETH:       edentulous:  upper and lower      Dentures   upper and lower    OROPHARYNX:    Direct examination  oropharyngeal mucosa normal, tonsil(s) with normal appearance      NECK:    normal appearance, no masses, no lesions, larynx normal mobility, trachea midline    LYMPH NODES :    no adenopathy    THYROID:    no overt thyromegaly, no tenderness, nodules or mass present on palpation, position midline    CHEST/RESPIRATORY:    respiratory effort normal, no rales, rubs or wheezing, no stridor, normal appearance to chest    CARDIOVASCULAR:    regular rate and rhythm, no murmurs, gallups, no peripheral edema    NEURO/PSYCHIATRIC :    oriented appropriately for age, mood normal, affect appropriate, cranial nerves intact grossly (unless specifically described), gait normal for age    RESULTS REVIEW:    I have reviewed the patients old records in the chart.    CT temporal bones 9/17/2020  [employer]  Notes  Encounters  Result Review  Labs  Imaging  [employer] Soaps         ASSESSMENT: {:2Problem List  Visit Diagnosis  Medications  SmartSets  Prep for Surgery  BestPractice    Diagnosis Plan   1. Perforation of both tympanic membranes      AS- 15% granulated central perf inferior  AD: inferior 5% perf   2. Otorrhea of right ear      Mild today   3. Chronic mastoiditis of left side      Improved today   4. Mastoid disorder, left     5. Dysfunction of both eustachian tubes      causing COM   6. Chronic mucoid otitis media of both ears      COM   7. Granulation tissue abnormality      Bilateral TMs   Bilateral MEs   8. Mixed conductive and sensorineural hearing loss of both ears      COM           PLAN:      Medical and surgical options were discussed including observation, continued medical management, medication modification and surgical management. Risks, benefits and alternatives were discussed and questions were answered. After considering the options, the patient decided to proceed with medication modification.  Patient has drying ears. He seems to do better with drops rather than powder. I will clean and add Pre Forte. Hold on Cortisporin and CSF for now.  I will try to dry ears and revisit CT in 6 months to see if thickening is maintained. I do not feel he has residual cholesteatoma.  I will address [place] needs after drying ears.  Water precautions  Hair dryer  Pred Forte daily  Recurrent cleaning  MY CHART:  Patient is Patient is using My Chart  New Medications Ordered This Visit   Medications   • prednisoLONE acetate (Pred Forte) 1 % ophthalmic suspension     Sig: Administer 2 drops to both eyes Daily. Ears- Both  NOT Eyes     Dispense:  5 mL     Refill:  3          Patient understand(s) and agree(s) with the treatment plan as described.    Return in about 1 month (around 10/21/2020) for Recheck ears.     Adolfo Bah Jr, MD  09/21/20  12:05 CDT

## 2020-09-21 NOTE — TELEPHONE ENCOUNTER
Meghan Ojeda called requesting a refill of the below medication which has been pended for you:     Requested Prescriptions     Pending Prescriptions Disp Refills    clopidogrel (PLAVIX) 75 MG tablet [Pharmacy Med Name: Clopidogrel Bisulfate 75 MG Oral Tablet] 90 tablet 1     Sig: Take 1 tablet by mouth once daily       Last Appointment Date: 7/2/2020  Next Appointment Date: 1/5/2021    No Known Allergies

## 2020-09-21 NOTE — PROGRESS NOTES
ENT PROCEDURE NOTE  Anesthesia: none    Procedure:  Ear microscopy bilateral    Procedure Details:    The patient was placed supine on the procedure table. Using a speculum, the ear was examined with the microscope. The following findings were noted:    Findings:   Ear Canal:     LEFT: debris present, suctioned clear with old drainage  RIGHT: debris occluding EAC with old drainage  Tympanic Membrane:     LEFT: thick superior, inferior central perf with posterior aspect granulated, 15% perf, AgNO3 applied to TM surface  RIGHT: TM coated, suction, TS present, 5% central perf with drainage present  Ossicular Chain:     BILATERAL: not well visualized  Middle ear space:     BILATERAL: granulated, thick, wet   Mastoid:  AS- well epithelialized and good coating of ear wax. No debridement today    AS: tympanic membrane    AS: Mastoid      AD:    Condition:  Stable.  Patient tolerated procedure well.    Complications:  None  Post-procedure instructions reviewed with Patient  Ear Instructions documented in AVS for patient to review

## 2020-09-21 NOTE — PATIENT INSTRUCTIONS
WATER PRECAUTIONS FOR EARS    Protecting your ears from water may sometimes be necessary for the health of your ears.     > Ear plugs: You may use earplugs: over the counter silicone plugs or a cotton ball coated with vasoline when bathing. If conservative measures are not working, consider obtaining molded earplugs from the audiology department to use while bathing or swimming.   Purchase inexpensive types that are most comfortable for you. You can make your own by using cotton balls mixed with a generous amount of Vaseline petroleum jelly. Gently place these in the ear canal.    > Dry the ear canal: after getting out of the shower or bath, use a hair dryer on low heat blowing in the ear for 10-15 seconds. Pulling gently backwards on the ear straightens the ear canal and allows the air to get further down.    > If you are to use ear drops, please place them in the ear canal and give them a few seconds to run down.  Follow this by blowing in the ear canal with a hair dryer set on low heat for approximately 10 to 15 seconds.  You may do this multiple times during the day to help keep the ear canal dry.    >If you are swimming frequently- place a drop of oil in each ear canal prior to entering water. After you are finished in the water, place a drop of vinegar in each ear canal. Use a hair dryer on low heat to blow in each ear canal for 10-15 seconds to dry ears out.    Ear drops Pred Forte 1-2 drops each ear 1 time daily     [email or web address]

## 2020-09-22 RX ORDER — PREDNISOLONE ACETATE 10 MG/ML
2 SUSPENSION/ DROPS OPHTHALMIC DAILY
Qty: 5 ML | Refills: 3 | Status: SHIPPED | OUTPATIENT
Start: 2020-09-22 | End: 2020-10-27 | Stop reason: SDUPTHER

## 2020-09-27 PROBLEM — H92.11 OTORRHEA OF RIGHT EAR: Status: ACTIVE | Noted: 2020-09-27

## 2020-09-27 PROBLEM — H74.41 GRANULATION POLYP OF RIGHT MIDDLE EAR: Status: ACTIVE | Noted: 2020-09-27

## 2020-09-28 NOTE — PATIENT INSTRUCTIONS
Continue treatment as directed, will get CT scan of the temporal and have patient follow-up with Dr. Bah.    For more information:  [employer]uit Now [place]  1-800-QUIT-NOW  [email or web address]

## 2020-10-21 ENCOUNTER — OFFICE VISIT (OUTPATIENT)
Dept: OTOLARYNGOLOGY | Facility: CLINIC | Age: 75
End: 2020-10-21

## 2020-10-21 VITALS
WEIGHT: 205 LBS | HEIGHT: 72 IN | BODY MASS INDEX: 27.77 KG/M2 | DIASTOLIC BLOOD PRESSURE: 85 MMHG | TEMPERATURE: 98 F | SYSTOLIC BLOOD PRESSURE: 151 MMHG | HEART RATE: 99 BPM

## 2020-10-21 DIAGNOSIS — H72.93 PERFORATION OF BOTH TYMPANIC MEMBRANES: Primary | ICD-10-CM

## 2020-10-21 DIAGNOSIS — H90.6 MIXED CONDUCTIVE AND SENSORINEURAL HEARING LOSS OF BOTH EARS: ICD-10-CM

## 2020-10-21 DIAGNOSIS — L92.9 GRANULATION TISSUE ABNORMALITY: ICD-10-CM

## 2020-10-21 DIAGNOSIS — H92.11 OTORRHEA OF RIGHT EAR: ICD-10-CM

## 2020-10-21 DIAGNOSIS — H74.92 MASTOID DISORDER, LEFT: ICD-10-CM

## 2020-10-21 DIAGNOSIS — H70.12 CHRONIC MASTOIDITIS OF LEFT SIDE: ICD-10-CM

## 2020-10-21 DIAGNOSIS — H65.33 CHRONIC MUCOID OTITIS MEDIA OF BOTH EARS: ICD-10-CM

## 2020-10-21 DIAGNOSIS — H69.83 DYSFUNCTION OF BOTH EUSTACHIAN TUBES: ICD-10-CM

## 2020-10-21 PROCEDURE — 99213 OFFICE O/P EST LOW 20 MIN: CPT | Performed by: OTOLARYNGOLOGY

## 2020-10-21 PROCEDURE — 69610 TYMPANIC MEMBRANE REPAIR: CPT | Performed by: OTOLARYNGOLOGY

## 2020-10-21 PROCEDURE — 69222 CLEAN OUT MASTOID CAVITY: CPT | Performed by: OTOLARYNGOLOGY

## 2020-10-21 NOTE — PROGRESS NOTES
ENT PROCEDURE NOTE:    Pre-operative Diagnosis: Perforation Tympanic Membrane right    Post-operative Diagnosis: same    Anesthesia: none    Procedure:  Myringoplasty, right    Procedure Details:    The patient was positioned for the procedure.  Using binocular microscopy, the ear was examined.  The perforation edges were freshened.  A piece of rolling paper was fashioned to cover the perforation. The patch was posItioned to cover the perforation and gently seated on the tympanic membrane.    Findings:   Ear canal- Debris suctioned out  Tympanic membrane- perforation and scarring, see pic  Ossicular chain- Malleus only seen  Middle ear- min thick, clear fluid emanating from perf  Hearing- Mildly improved with patch    AD: Before       After: paper patch placed over perf      Condition:  Stable.  Patient tolerated procedure well.    Complications:  None    Post-procedure instructions reviewed with Patient  Instructions documented in AVS for patient to review

## 2020-10-21 NOTE — PROGRESS NOTES
Adolfo Bah Jr, MD     ENT FOLLOW UP NOTE     Chief Complaint   Patient presents with   • Follow-up     recheck ears        HISTORY OF PRESENT ILLNESS:  Accompanied by:  No one  Prashanth Cantu is a  75 y.o. male who is here for follow up. Ears are better.  He has R side drainage in AM.  Dr Acevedo has used Mometasone in ears in the past.    Review of Systems   Constitutional: Negative for appetite change, fatigue and fever.   HENT:        See HPI   Respiratory: Negative for cough, choking and shortness of breath.    Gastrointestinal: Negative for diarrhea, nausea and vomiting.   Skin: Negative for rash.   Neurological: Negative for dizziness, light-headedness and headaches.   Psychiatric/Behavioral: Negative for sleep disturbance.   me    Past History:  Past medical and surgical history, family history and social history reviewed and updated when appropriate.  Current medications and allergies reviewed and updated when appropriate.  Allergies:  Patient has no known allergies.        Vital Signs:   Temp:  [98 °F (36.7 °C)] 98 °F (36.7 °C)  Heart Rate:  [99] 99  BP: (151)/(85) 151/85  EXAMINATION:  CONSTITUTIONAL:    well nourished, well-developed, alert, oriented, in no acute distress      BODY HABITUS:    Normal body habitus     COMMUNICATION:    able to communicate normally, normal voice quality     HEAD:     Normocephalic, without obvious abnormality, atraumatic     FACE:    structure normal, no tenderness present, no lesions/masses, no evidence of trauma     SALIVARY GLANDS:    parotid glands with no tenderness, no swelling, no masses, submandibular glands with normal size, nontender      EYE:    ocular motility normal, eyelids normal, orbits normal, no proptosis, conjunctiva clear, sclera non-icteric, pupils equal, round, reactive to light and accomodation  Color:   blue     HEARING:      response to conversational voice decreased  Bilateral    abnormal:      AS: Worse      AD: augmented  Hearing aids  present  Right     EARS:    Microscopic exam- See procedure note     Bilateral     NOSE EXTERNAL:    APPEARANCE: normal, straight, with good projection, no tenderness, no lesions, no tenderness, good nasal support, patent nares     NOSE INTERNAL:    Anterior rhinoscopy   NASALMUCOSA:    intact mucosa, no lesions    NASAL PASSAGES:      Left   normal, patent     abnormal   Right- narrowed by septum   NASAL VALVE:     intact, good support and no nasal obstruction   SEPTUM:     normal mucosa without crusting or lesions  Bilateral    deviation present:      deviated Right low and mid mild to mod   INFERIOR TURBINATES:     normal size, non-obstructing, no lesions     ORAL CAVITY:    Normal lips with no lesions, dentition normal for age, FOM intact without lesions and normal salivary flow, Mucosa intact without lesions, Hard and soft palate normal without lesions    TEETH:       edentulous:  upper and lower      Dentures   upper and lower     OROPHARYNX:    Direct examination  oropharyngeal mucosa normal, tonsil(s) with normal appearance       NECK:    normal appearance, no masses, no lesions, larynx normal mobility, trachea midline     LYMPH NODES :    no adenopathy     THYROID:    no overt thyromegaly, no tenderness, nodules or mass present on palpation, position midline     CHEST/RESPIRATORY:    respiratory effort normal, no rales, rubs or wheezing, no stridor, normal appearance to chest     CARDIOVASCULAR:    regular rate and rhythm, no murmurs, gallups, no peripheral edema     NEURO/PSYCHIATRIC :    oriented appropriately for age, mood normal, affect appropriate, cranial nerves intact grossly (unless specifically described), gait normal for age    RESULTS REVIEW:    I have reviewed the patients old records in the chart.    CT temporal bones 9/17/2020        ASSESSMENT:      Diagnosis Plan   1. Perforation of both tympanic membranes      Left moderately large with granulated edges  Right small with healed edges and  drainage from the middle ear space   2. Otorrhea of right ear     3. Chronic mastoiditis of left side      Debris cleared today   4. Mastoid disorder, left     5. Dysfunction of both eustachian tubes      Causing chronic otitis media   6. Chronic mucoid otitis media of both ears     7. Granulation tissue abnormality      Left tympanic membrane has granulated edges  Right middle ear space is mildly granulated from chronic drainage   8. Mixed conductive and sensorineural hearing loss of both ears             PLAN:      Continue current management plan.  Patient has bilateral perforations.  Left tympanic membrane perforation appears to be intact at this point in time and dry.  I have cleaned out the mastoid with copious wax and dried debris.  The perforation edges are mildly granulated.  The edges were touched with silver nitrate to try and dry any moisture of the granulation.  The right tympanic membrane perforation continues to drain.  It appears that the drainage is coming from the middle ear space.  I have covered the perforation to see if closing the perforation may indeed decrease drainage.  If so, he may benefit from tympanoplasty.  He will stop drops for now.  I will continue to dry the right ear.  He can continue to wear hearing aid in the right ear.  I recommend he get a new hearing aid for the right ear.  CT scan does not reveal cholesteatoma.  I feel this is scar tissue from multiple prior surgeries.  Water precautions  Stop eardrops  MY CHART:  Patient is Patient is using My Chart          Patient understand(s) and agree(s) with the treatment plan as described.    Return in about 2 weeks (around 11/4/2020) for Recheck Both ears.     Adolfo Bah Jr, MD  10/21/20  12:1o CDT

## 2020-10-21 NOTE — PROGRESS NOTES
ENT PROCEDURE NOTE:  Pre-operative Diagnosis: Mastoid CANAL WALL DOWN, debris    Post-operative Diagnosis: same    Anesthesia: none    Procedure:  Binocular ear microscopy with cerumen removal    Side:  LEFT    Procedure Details:    The patient was placed supine on the procedure table. Using a speculum, the ear(s) was/were examined with the microscope.   Using Micro-instrumentation and suction, the mastoid was cleared.    Findings:  Ear canal: CANAL WALL DOWN with copious ear wax  Tympanic membrane: perf in mid portion, granulated edges, scarring  Middle Ear: mildly thick, dry  Mastoid: full with debris, removed  AgNO3 applied to edge of perforation to dry the granulation    AS: After cleaning        Condition:  Stable.  Patient tolerated procedure well.    Complications:  Mild vertigo with debridement    Post-procedure instructions reviewed with Patient  Ear care initiated, instructions on AVS

## 2020-10-21 NOTE — PATIENT INSTRUCTIONS
Stop ear drops    WATER PRECAUTIONS FOR EARS    Protecting your ears from water may sometimes be necessary for the health of your ears.     > Ear plugs: You may use earplugs: over the counter silicone plugs or a cotton ball coated with vasoline when bathing. If conservative measures are not working, consider obtaining molded earplugs from the audiology department to use while bathing or swimming.   Purchase inexpensive types that are most comfortable for you. You can make your own by using cotton balls mixed with a generous amount of Vaseline petroleum jelly. Gently place these in the ear canal.    > Dry the ear canal: after getting out of the shower or bath, use a hair dryer on low heat blowing in the ear for 10-15 seconds. Pulling gently backwards on the ear straightens the ear canal and allows the air to get further down.    > If you are to use ear drops, please place them in the ear canal and give them a few seconds to run down.  Follow this by blowing in the ear canal with a hair dryer set on low heat for approximately 10 to 15 seconds.  You may do this multiple times during the day to help keep the ear canal dry.    >If you are swimming frequently- place a drop of oil in each ear canal prior to entering water. After you are finished in the water, place a drop of vinegar in each ear canal. Use a hair dryer on low heat to blow in each ear canal for 10-15 seconds to dry ears out.     [email or web address]

## 2020-10-27 ENCOUNTER — OFFICE VISIT (OUTPATIENT)
Dept: OTOLARYNGOLOGY | Facility: CLINIC | Age: 75
End: 2020-10-27

## 2020-10-27 VITALS
HEART RATE: 77 BPM | TEMPERATURE: 97.8 F | BODY MASS INDEX: 28.61 KG/M2 | DIASTOLIC BLOOD PRESSURE: 82 MMHG | WEIGHT: 211.2 LBS | HEIGHT: 72 IN | SYSTOLIC BLOOD PRESSURE: 149 MMHG

## 2020-10-27 DIAGNOSIS — H70.12 CHRONIC MASTOIDITIS OF LEFT SIDE: ICD-10-CM

## 2020-10-27 DIAGNOSIS — H72.93 PERFORATION OF BOTH TYMPANIC MEMBRANES: ICD-10-CM

## 2020-10-27 DIAGNOSIS — H74.92 MASTOID DISORDER, LEFT: ICD-10-CM

## 2020-10-27 DIAGNOSIS — L92.9 GRANULATION TISSUE ABNORMALITY: ICD-10-CM

## 2020-10-27 DIAGNOSIS — H92.13 CHRONIC OTORRHEA, BILATERAL: Primary | ICD-10-CM

## 2020-10-27 DIAGNOSIS — H65.33 CHRONIC MUCOID OTITIS MEDIA OF BOTH EARS: ICD-10-CM

## 2020-10-27 DIAGNOSIS — H69.83 DYSFUNCTION OF BOTH EUSTACHIAN TUBES: ICD-10-CM

## 2020-10-27 PROCEDURE — 99024 POSTOP FOLLOW-UP VISIT: CPT | Performed by: OTOLARYNGOLOGY

## 2020-10-27 PROCEDURE — 69222 CLEAN OUT MASTOID CAVITY: CPT | Performed by: OTOLARYNGOLOGY

## 2020-10-27 RX ORDER — PREDNISOLONE ACETATE 10 MG/ML
SUSPENSION/ DROPS OPHTHALMIC
Qty: 10 ML | Refills: 2 | Status: SHIPPED | OUTPATIENT
Start: 2020-10-27

## 2020-10-27 NOTE — PROGRESS NOTES
ENT PROCEDURE NOTE  Anesthesia: none    Diagnosis:   No diagnosis found.     Procedure:  Ear microscopy bilateral    Procedure Details:    The patient was placed supine on the procedure table. Using a speculum, the ear was examined with the microscope. Using micro-instrumentation, the ear canal was suctioned clean and dry.  Using Montes tip applicators and cotton the middle ear was gently compressed and debrided.  The  Middle ear was suctioned bilaterally    Findings:   Ear Canal:   LEFT: Crusting especially down in the anterior sulcus up onto the facial nerve ridge and into the superior mastoid cavity.  This is gently all held and removed.  RIGHT: Completely filled with cloudy drainage down to the tympanic membrane  Tympanic Membrane:   LEFT: Crusting of the superior tympanic membrane extending into the superior mastoid cavity, anterior sulcus and tympanic membrane coated with secretions, tympanic membrane inferiorly very red with a central perforation that is granulated approximately 5 to 10%, drainage coming from the perforation  RIGHT: Very scarred tympanic membrane with no malleus apparent, perforation covered with the paper patch, paper patch removed for suction of the middle ear, there is a 5 to 10% perforation in the central portion of the tympanic membrane with surrounding granulated lateral surface, drainage coming from the middle ear through the perforation  Ossicular Chain:   LEFT: Not visualized  RIGHT: Not visualized  Middle ear space:   LEFT: Granulation and drainage from the perforation from the middle ear space itself  RIGHT: Pale granulation of the middle ear visualized to the perforation with cloudy drainage     AD: Pinnae very tender with drainage from the meatus  Left ear: Mastoid cavity tender and medial sternal auditory canal near the tympanic membrane very tender    Condition:  Stable.  Patient tolerated procedure well.    Complications:  None    Post-procedure instructions reviewed with  Patient  Ear instructions

## 2020-10-27 NOTE — PROGRESS NOTES
Adolfo Bah Jr, MD     ENT FOLLOW UP NOTE     Chief Complaint   Patient presents with   • Ear Problem        HISTORY OF PRESENT ILLNESS:  Accompanied by: No one  Prashanth Cantu is a  75 y.o. male who is here for follow up.  Patient called in yesterday complaining of significant drainage right greater than left from his ears.  He has significant ear pain.  He was instructed to come in for examination.    Review of Systems   Constitutional: Negative for chills and fever.   HENT: Positive for hearing loss.    Gastrointestinal: Negative for diarrhea, nausea and vomiting.       Reviewed per patient intake note and confirmed by me    Past History:  Past medical and surgical history, family history and social history reviewed and updated when appropriate.  Current medications and allergies reviewed and updated when appropriate.  Allergies:  Patient has no known allergies.        Vital Signs:   Temp:  [97.8 °F (36.6 °C)] 97.8 °F (36.6 °C)  Heart Rate:  [77] 77  BP: (149)/(82) 149/82  EXAMINATION:  CONSTITUTIONAL:    well nourished, well-developed, alert, oriented, in no acute distress      BODY HABITUS:    Normal body habitus     COMMUNICATION:    able to communicate normally, normal voice quality     HEAD:     Normocephalic, without obvious abnormality, atraumatic     FACE:    structure normal, no tenderness present, no lesions/masses, no evidence of trauma     SALIVARY GLANDS:    parotid glands with no tenderness, no swelling, no masses, submandibular glands with normal size, nontender      EYE:    ocular motility normal, eyelids normal, orbits normal, no proptosis, conjunctiva clear, sclera non-icteric, pupils equal, round, reactive to light and accomodation  Color:   blue     HEARING:      response to conversational voice decreased  Bilateral    abnormal:      AS: Worse      AD: augmented  Hearing aids present  Right     EARS:     AS:      AD:    NOSE EXTERNAL:    APPEARANCE: normal, straight, with good  projection, no tenderness, no lesions, no tenderness, good nasal support, patent nares     NOSE INTERNAL:    Anterior rhinoscopy   NASALMUCOSA:    intact mucosa, no lesions    NASAL PASSAGES:      Left   normal, patent     abnormal   Right- narrowed by septum   NASAL VALVE:     intact, good support and no nasal obstruction   SEPTUM:     normal mucosa without crusting or lesions  Bilateral    deviation present:      deviated Right low and mid mild to mod   INFERIOR TURBINATES:     normal size, non-obstructing, no lesions     ORAL CAVITY:    Normal lips with no lesions, dentition normal for age, FOM intact without lesions and normal salivary flow, Mucosa intact without lesions, Hard and soft palate normal without lesions    TEETH:       edentulous:  upper and lower      Dentures   upper and lower     OROPHARYNX:    Direct examination  oropharyngeal mucosa normal, tonsil(s) with normal appearance       NECK:    normal appearance, no masses, no lesions, larynx normal mobility, trachea midline     LYMPH NODES :    no adenopathy     THYROID:    no overt thyromegaly, no tenderness, nodules or mass present on palpation, position midline     CHEST/RESPIRATORY:    respiratory effort normal, no rales, rubs or wheezing, no stridor, normal appearance to chest     CARDIOVASCULAR:    regular rate and rhythm, no murmurs, gallups, no peripheral edema     NEURO/PSYCHIATRIC :    oriented appropriately for age, mood normal, affect appropriate, cranial nerves intact grossly (unless specifically described), gait normal for age  RESULTS REVIEW:    I have reviewed the patients old records in the chart.           ASSESSMENT:  {Prob List  Visit Dx  [facility]  [facility]  Prep for [facility]  Best[facility]ractice :23}    Diagnosis Plan   1. Chronic otorrhea, bilateral      Clear today   2. Perforation of both tympanic membranes      Left mid perforation 5 to 10% with drainage  Right mid perforation 10% with surrounding granulation of the lateral surface  with drainage from the middle ear   3. Chronic mastoiditis of left side      Canal wall down mastoidectomy   4. Mastoid disorder, left      Chronic mastoiditis from prior canal wall down mastoidectomy   5. Dysfunction of both eustachian tubes     6. Chronic mucoid otitis media of both ears      With otorrhea each ear   7. Granulation tissue abnormality      Bilateral middle ear spaces           PLAN:      Continue current management plan.  Patient has bilateral otorrhea.  I have cleared both ears and suction the middle ear.  I remove the paper patch on the right side is I feel this obstructs the perforation allows the accumulation of middle ear secretions.  He will use Pred forte eardrops to both ears and follow with a hair dryer.  I will try to dry the middle ear spaces.  If this does not help I will go to powder.  We will continue frequent debridement at this point in time.  Given the middle ear granulation, patient may require further ear surgery and blood ration of the middle ear space.  Pred forte 2 drops each ear twice daily  Hair dryer to both ears  Follow-up in 2 weeks for recleaning of the ears  MY CHART:  Patient is Patient is using My Chart  New Medications Ordered This Visit   Medications   • prednisoLONE acetate (Pred Forte) 1 % ophthalmic suspension     Si drops AU BID     Dispense:  10 mL     Refill:  2          Patient understand(s) and agree(s) with the treatment plan as described.    Return in about 2 weeks (around 11/10/2020) for Recheck ears, micro.     Adolfo Bah Jr, MD  10/27/20  14:32 CDT

## 2020-10-27 NOTE — PATIENT INSTRUCTIONS
WATER PRECAUTIONS FOR EARS    Protecting your ears from water may sometimes be necessary for the health of your ears.     > Ear plugs: You may use earplugs: over the counter silicone plugs or a cotton ball coated with vasoline when bathing. If conservative measures are not working, consider obtaining molded earplugs from the audiology department to use while bathing or swimming.   Purchase inexpensive types that are most comfortable for you. You can make your own by using cotton balls mixed with a generous amount of Vaseline petroleum jelly. Gently place these in the ear canal.    > Dry the ear canal: after getting out of the shower or bath, use a hair dryer on low heat blowing in the ear for 10-15 seconds. Pulling gently backwards on the ear straightens the ear canal and allows the air to get further down.    > If you are to use ear drops, please place them in the ear canal and give them a few seconds to run down.  Follow this by blowing in the ear canal with a hair dryer set on low heat for approximately 10 to 15 seconds.  You may do this multiple times during the day to help keep the ear canal dry.    >If you are swimming frequently- place a drop of oil in each ear canal prior to entering water. After you are finished in the water, place a drop of vinegar in each ear canal. Use a hair dryer on low heat to blow in each ear canal for 10-15 seconds to dry ears out.    Pred Forte ear drops, 2 drops each ear 2 times daily, followed by hair dryer     [email or web address]

## 2020-10-29 RX ORDER — AZITHROMYCIN 250 MG/1
TABLET, FILM COATED ORAL
Qty: 6 TABLET | Refills: 0 | Status: SHIPPED | OUTPATIENT
Start: 2020-10-29 | End: 2020-11-06

## 2020-10-29 NOTE — TELEPHONE ENCOUNTER
PT would like a zpak, states he is in too much pain and would like to try and heal the ear infection quickly

## 2020-11-07 RX ORDER — AZITHROMYCIN 250 MG/1
TABLET, FILM COATED ORAL
Qty: 6 TABLET | Refills: 0 | Status: SHIPPED | OUTPATIENT
Start: 2020-11-07

## 2020-11-09 RX ORDER — AZELASTINE HYDROCHLORIDE 137 UG/1
SPRAY, METERED NASAL
Qty: 30 ML | Refills: 0 | Status: SHIPPED | OUTPATIENT
Start: 2020-11-09 | End: 2020-12-29

## 2020-12-09 ENCOUNTER — VIRTUAL VISIT (OUTPATIENT)
Dept: INTERNAL MEDICINE | Age: 75
End: 2020-12-09
Payer: MEDICARE

## 2020-12-09 PROCEDURE — 4040F PNEUMOC VAC/ADMIN/RCVD: CPT | Performed by: NURSE PRACTITIONER

## 2020-12-09 PROCEDURE — G0439 PPPS, SUBSEQ VISIT: HCPCS | Performed by: NURSE PRACTITIONER

## 2020-12-09 PROCEDURE — 1123F ACP DISCUSS/DSCN MKR DOCD: CPT | Performed by: NURSE PRACTITIONER

## 2020-12-09 PROCEDURE — G8482 FLU IMMUNIZE ORDER/ADMIN: HCPCS | Performed by: NURSE PRACTITIONER

## 2020-12-09 RX ORDER — ERGOCALCIFEROL 1.25 MG/1
50000 CAPSULE ORAL WEEKLY
Qty: 30 CAPSULE | Refills: 3 | Status: SHIPPED | OUTPATIENT
Start: 2020-12-09 | End: 2021-01-05 | Stop reason: SDUPTHER

## 2020-12-09 RX ORDER — LISINOPRIL 2.5 MG/1
2.5 TABLET ORAL DAILY
Qty: 90 TABLET | Refills: 3 | Status: SHIPPED | OUTPATIENT
Start: 2020-12-09 | End: 2021-01-05 | Stop reason: SDUPTHER

## 2020-12-09 RX ORDER — LOVASTATIN 20 MG/1
20 TABLET ORAL NIGHTLY
Qty: 90 TABLET | Refills: 3 | Status: SHIPPED | OUTPATIENT
Start: 2020-12-09 | End: 2021-01-05 | Stop reason: SDUPTHER

## 2020-12-09 ASSESSMENT — PATIENT HEALTH QUESTIONNAIRE - PHQ9
SUM OF ALL RESPONSES TO PHQ QUESTIONS 1-9: 0
2. FEELING DOWN, DEPRESSED OR HOPELESS: 0
SUM OF ALL RESPONSES TO PHQ9 QUESTIONS 1 & 2: 0
SUM OF ALL RESPONSES TO PHQ QUESTIONS 1-9: 0
SUM OF ALL RESPONSES TO PHQ QUESTIONS 1-9: 0
1. LITTLE INTEREST OR PLEASURE IN DOING THINGS: 0

## 2020-12-09 ASSESSMENT — LIFESTYLE VARIABLES: HOW OFTEN DO YOU HAVE A DRINK CONTAINING ALCOHOL: 0

## 2020-12-09 NOTE — PATIENT INSTRUCTIONS
Personalized Preventive Plan for Bladimir Keene - 12/9/2020  Medicare offers a range of preventive health benefits. Some of the tests and screenings are paid in full while other may be subject to a deductible, co-insurance, and/or copay. Some of these benefits include a comprehensive review of your medical history including lifestyle, illnesses that may run in your family, and various assessments and screenings as appropriate. After reviewing your medical record and screening and assessments performed today your provider may have ordered immunizations, labs, imaging, and/or referrals for you. A list of these orders (if applicable) as well as your Preventive Care list are included within your After Visit Summary for your review. Other Preventive Recommendations:    · A preventive eye exam performed by an eye specialist is recommended every 1-2 years to screen for glaucoma; cataracts, macular degeneration, and other eye disorders. · A preventive dental visit is recommended every 6 months. · Try to get at least 150 minutes of exercise per week or 10,000 steps per day on a pedometer . · Order or download the FREE \"Exercise & Physical Activity: Your Everyday Guide\" from [employer]. Call 0-536.977.2463 or search [employer] online. · You need 5046-8975 mg of calcium and 4337-8270 IU of vitamin D per day. It is possible to meet your calcium requirement with diet alone, but a vitamin D supplement is usually necessary to meet this goal.  · When exposed to the sun, use a sunscreen that protects against both UVA and UVB radiation with an SPF of 30 or greater. Reapply every 2 to 3 hours or after sweating, drying off with a towel, or swimming. · Always wear a seat belt when traveling in a car. Always wear a helmet when riding a bicycle or motorcycle.

## 2020-12-09 NOTE — PROGRESS NOTES
Medicare Annual Wellness Visit  Are Name: Janett Pereira Date: 2020   MRN: [id number] Sex: Male   Age: 67 y.o. Ethnicity: Non-/Non    : 1945 Race: Martin Keene is here for [employer] (Telehealth Medicare Wellness visit.)    Screenings for behavioral, psychosocial and functional/safety risks, and cognitive dysfunction are all negative except as indicated below. These results, as well as other patient data from the [address] form, are documented in Flowsheets linked to this Encounter. No Known Allergies      Prior to Visit Medications    Medication Sig Taking? Authorizing Provider   lisinopril (PRINIVIL;ZESTRIL) 2.5 MG tablet Take 1 tablet by mouth daily Yes ALLYN Ramos   lovastatin (MEVACOR) 20 MG tablet Take 1 tablet by mouth nightly Yes ALLYN Ramos   vitamin D (ERGOCALCIFEROL) 1.25 MG (51091 UT) CAPS capsule Take 1 capsule by mouth once a week Yes ALLYN Ramos   clopidogrel (PLAVIX) 75 MG tablet Take 1 tablet by mouth once daily Yes ALLYN Ramos   ADVAIR DISKUS 250-50 MCG/DOSE AEPB INHALE ONE DOSE BY MOUTH TWICE DAILY Yes ALLYN Ramos   nitroGLYCERIN (NITROSTAT) 0.4 MG SL tablet Place 1 tablet under the tongue every 5 minutes as needed for Chest pain up to max of 3 total doses. If no relief after 1 dose, call 911.  Yes ALLYN Ramos   gabapentin (NEURONTIN) 300 MG capsule TAKE 1 CAPSULE BY MOUTH EVERY 8 HOURS AS NEEDED Yes Historical Provider, MD   tiZANidine (ZANAFLEX) 4 MG tablet Take 1 tablet by mouth 3 times daily Yes ELVIS Chapin   sildenafil (VIAGRA) 100 MG tablet Take 1 tablet by mouth as needed for Erectile Dysfunction Yes Thea Rowan MD   Cyanocobalamin (VITAMIN B 12 PO) Take 1 tablet by mouth daily Yes Historical Provider, MD   aspirin 81 MG EC tablet Take 81 mg by mouth daily Yes Historical Provider, MD   oxyCODONE-acetaminophen (PERCOCET) 7.5-325 MG per tablet  Yes Historical Provider, MD   fluticasone (FLONASE) 50 MCG/ACT nasal spray  Yes Historical Provider, MD   ibuprofen (ADVIL;MOTRIN) 800 MG tablet Take 1 tablet by mouth 2 times daily as needed for Pain Yes Jn Mccray MD         Past Medical History:   Diagnosis Date    Adenomatous colon polyp     CAD (coronary artery disease)     LAD stent 3/2005 [place]    COPD [facility] obstructive [facility] disease) [facility] [address]     Hypertension     Male erectile dysfunction     Pure hypercholesterolemia     Sick sinus syndrome ([address]     Vertigo        Past Surgical History:   Procedure Laterality Date    MASTOIDECTOMY      left ear 3/15 tympanomastoidectomy, Dr Trung Shukla, [facility]         Family History   Problem Relation Age of Onset    Breast Cancer Mother     Emphysema Father        CareTeam (Including outside providers/suppliers regularly involved in providing care):   Patient Care Team:  ALLYN Corado as PCP - General (Nurse Practitioner Acute Care)  ALLYN Corado as PCP - [facility] Empaneled Provider   Dr. Brodie Carrel;  Pain management     Wt Readings from Last 3 Encounters:   07/02/20 202 lb (91.6 kg)   01/06/20 201 lb (91.2 kg)   07/10/19 200 lb (90.7 kg)      No flowsheet data found. There is no height or weight on file to calculate BMI. Based upon direct observation of the patient, evaluation of cognition reveals remote memory intact, recent memory impaired. DGEVISITPE      GENERAL:   Afebrile alert no acute distress  Respiratory no use of accessory muscles no acute distress no audible wheezing  Mental status alert oriented adequate thought processes      Patient's complete Health Risk Assessment and screening values have been reviewed and are found in Flowsheets. The following problems were reviewed today and where indicated follow up appointments were made and/or referrals ordered.     Positive Risk Factor Screenings with Interventions:     Substance History:  Social History     Tobacco History     Smoking Status  Current Every Day Smoker Smoking Frequency  1 pack/day for 30 years (30 pk yrs) Smoking Tobacco Type  Cigarettes    Smokeless Tobacco Use  Never Used          Alcohol History     Alcohol Use Status  No          Drug Use     Drug Use Status  Not Asked          Sexual Activity     Sexually Active  Not Asked               Alcohol Screening:       A score of 8 or more is associated with harmful or hazardous drinking. A score of 13 or more in women, and 15 or more in men, is likely to indicate alcohol dependence. Substance Abuse Interventions:  · na    General Health and ACP:  General  In general, how would you say your health is?: Good  In the past 7 days, have you experienced any of the following?  New or Increased Pain, New or Increased Fatigue, Loneliness, Social Isolation, Stress or Anger?: None of These  Do you get the social and emotional support that you need?: Yes  Do you have a Living Will?: Yes  Advance Directives     Power of  Living Will ACP-Advance Directive ACP-Power of     Not on File Not on File [address] Interventions:  · na    Health Habits/Nutrition:  Health Habits/Nutrition  Do you exercise for at least 20 minutes 2-3 times per week?: Yes  Have you lost any weight without trying in the past 3 months?: No  Do you eat fewer than 2 meals per day?: No  Have you seen a dentist within the past year?: (!) No     Health Habits/Nutrition Interventions:  · Dental exam overdue:  na    Personalized Preventive Plan   Current Health Maintenance Status  Immunization History   Administered Date(s) Administered    Influenza, High Dose (Fluzone 65 yrs and older) 11/08/2017    Influenza, Quadv, IM, PF (6 mo and older Fluzone, Flulaval, Fluarix, and 3 yrs and older Afluria) 10/15/2019    Influenza, Triv, inactivated, subunit, adjuvanted, IM (Fluad 65 yrs and older) 11/18/2020    Pneumococcal Conjugate 13-valent (Genette Guise) 12/08/2014, 12/10/2015    Pneumococcal guardian's) consent, to reduce the patient's risk of exposure to COVID-19 and provide necessary medical care. The patient (and/or legal guardian) has also been advised to contact this office for worsening conditions or problems, and seek emergency medical treatment and/or call 911 if deemed necessary. Patient identification was verified at the start of the visit: Yes    Services were provided through phone to substitute for in-person clinic visit. Patient and provider were located at their individual homes. --ALLYN Leon on 12/9/2020 at 10:05 AM    An electronic signature was used to authenticate this note.

## 2020-12-28 ENCOUNTER — TELEPHONE (OUTPATIENT)
Dept: INTERNAL MEDICINE | Age: 75
End: 2020-12-28

## 2020-12-28 NOTE — TELEPHONE ENCOUNTER
12-28-20 Called spoke with Huy Stafford (wife) notiifed her that Anurag Zach has an apt with Derek GRUBER on 01-05-21. Please complete labs prior to that apt.  Cv

## 2020-12-29 RX ORDER — AZELASTINE HYDROCHLORIDE 137 UG/1
SPRAY, METERED NASAL
Qty: 30 ML | Refills: 1 | Status: SHIPPED | OUTPATIENT
Start: 2020-12-29

## 2020-12-30 DIAGNOSIS — E78.00 PURE HYPERCHOLESTEROLEMIA: ICD-10-CM

## 2020-12-30 DIAGNOSIS — E55.9 VITAMIN D DEFICIENCY: ICD-10-CM

## 2020-12-30 DIAGNOSIS — Z12.5 ENCOUNTER FOR PROSTATE CANCER SCREENING: ICD-10-CM

## 2020-12-30 DIAGNOSIS — I25.10 CORONARY ARTERY DISEASE INVOLVING NATIVE CORONARY ARTERY OF NATIVE HEART WITHOUT ANGINA PECTORIS: ICD-10-CM

## 2020-12-30 LAB
ALBUMIN SERPL-MCNC: 4.4 G/DL (ref 3.5–5.2)
ALP BLD-CCNC: 72 U/L (ref 40–130)
ALT SERPL-CCNC: 17 U/L (ref 5–41)
ANION GAP SERPL CALCULATED.3IONS-SCNC: 10 MMOL/L (ref 7–19)
AST SERPL-CCNC: 17 U/L (ref 5–40)
BACTERIA: NEGATIVE /HPF
BASOPHILS ABSOLUTE: 0 K/UL (ref 0–0.2)
BASOPHILS RELATIVE PERCENT: 0.7 % (ref 0–1)
BILIRUB SERPL-MCNC: 1.3 MG/DL (ref 0.2–1.2)
BILIRUBIN URINE: NEGATIVE
BLOOD, URINE: NEGATIVE
BUN BLDV-MCNC: 15 MG/DL (ref 8–23)
CALCIUM SERPL-MCNC: 9.8 MG/DL (ref 8.8–10.2)
CHLORIDE BLD-SCNC: 105 MMOL/L (ref 98–111)
CHOLESTEROL, TOTAL: 109 MG/DL (ref 160–199)
CLARITY: CLEAR
CO2: 26 MMOL/L (ref 22–29)
COLOR: YELLOW
CREAT SERPL-MCNC: 0.8 MG/DL (ref 0.5–1.2)
CRYSTALS, UA: ABNORMAL /HPF
EOSINOPHILS ABSOLUTE: 0.1 K/UL (ref 0–0.6)
EOSINOPHILS RELATIVE PERCENT: 1.1 % (ref 0–5)
EPITHELIAL CELLS, UA: 1 /HPF (ref 0–5)
GFR AFRICAN AMERICAN: >59
GFR NON-AFRICAN AMERICAN: >60
GLUCOSE BLD-MCNC: 95 MG/DL (ref 74–109)
GLUCOSE URINE: NEGATIVE MG/DL
HCT VFR BLD CALC: 44.9 % (ref 42–52)
HDLC SERPL-MCNC: 39 MG/DL (ref 55–121)
HEMOGLOBIN: 14.8 G/DL (ref 14–18)
HYALINE CASTS: 0 /HPF (ref 0–8)
IMMATURE GRANULOCYTES #: 0 K/UL
KETONES, URINE: NEGATIVE MG/DL
LDL CHOLESTEROL CALCULATED: 50 MG/DL
LEUKOCYTE ESTERASE, URINE: ABNORMAL
LYMPHOCYTES ABSOLUTE: 1 K/UL (ref 1.1–4.5)
LYMPHOCYTES RELATIVE PERCENT: 22.3 % (ref 20–40)
MCH RBC QN AUTO: 31 PG (ref 27–31)
MCHC RBC AUTO-ENTMCNC: 33 G/DL (ref 33–37)
MCV RBC AUTO: 93.9 FL (ref 80–94)
MONOCYTES ABSOLUTE: 0.6 K/UL (ref 0–0.9)
MONOCYTES RELATIVE PERCENT: 13.5 % (ref 0–10)
NEUTROPHILS ABSOLUTE: 2.8 K/UL (ref 1.5–7.5)
NEUTROPHILS RELATIVE PERCENT: 62 % (ref 50–65)
NITRITE, URINE: NEGATIVE
PDW BLD-RTO: 12.4 % (ref 11.5–14.5)
PH UA: 6.5 (ref 5–8)
PLATELET # BLD: 190 K/UL (ref 130–400)
PMV BLD AUTO: 10 FL (ref 9.4–12.4)
POTASSIUM SERPL-SCNC: 4 MMOL/L (ref 3.5–5)
PROSTATE SPECIFIC ANTIGEN: 1.6 NG/ML (ref 0–4)
PROTEIN UA: NEGATIVE MG/DL
RBC # BLD: 4.78 M/UL (ref 4.7–6.1)
RBC UA: 2 /HPF (ref 0–4)
SODIUM BLD-SCNC: 141 MMOL/L (ref 136–145)
SPECIFIC GRAVITY UA: 1.02 (ref 1–1.03)
TOTAL PROTEIN: 7 G/DL (ref 6.6–8.7)
TRIGL SERPL-MCNC: 99 MG/DL (ref 0–149)
UROBILINOGEN, URINE: 1 E.U./DL
VITAMIN D 25-HYDROXY: 47.9 NG/ML
WBC # BLD: 4.5 K/UL (ref 4.8–10.8)
WBC UA: 8 /HPF (ref 0–5)

## 2021-01-05 ENCOUNTER — OFFICE VISIT (OUTPATIENT)
Dept: INTERNAL MEDICINE | Age: 76
End: 2021-01-05
Payer: MEDICARE

## 2021-01-05 VITALS
OXYGEN SATURATION: 97 % | HEART RATE: 72 BPM | DIASTOLIC BLOOD PRESSURE: 72 MMHG | HEIGHT: 72 IN | SYSTOLIC BLOOD PRESSURE: 138 MMHG | WEIGHT: 208 LBS | BODY MASS INDEX: 28.17 KG/M2

## 2021-01-05 DIAGNOSIS — R41.9 COGNITIVE COMPLAINTS: Primary | ICD-10-CM

## 2021-01-05 DIAGNOSIS — R41.3 MEMORY IMPAIRMENT: ICD-10-CM

## 2021-01-05 DIAGNOSIS — G62.9 NEUROPATHY: ICD-10-CM

## 2021-01-05 DIAGNOSIS — J44.9 CHRONIC OBSTRUCTIVE PULMONARY DISEASE, UNSPECIFIED COPD TYPE (HCC): ICD-10-CM

## 2021-01-05 DIAGNOSIS — I10 ESSENTIAL HYPERTENSION: ICD-10-CM

## 2021-01-05 DIAGNOSIS — E78.00 PURE HYPERCHOLESTEROLEMIA: ICD-10-CM

## 2021-01-05 DIAGNOSIS — E55.9 VITAMIN D DEFICIENCY: ICD-10-CM

## 2021-01-05 PROCEDURE — G8482 FLU IMMUNIZE ORDER/ADMIN: HCPCS | Performed by: NURSE PRACTITIONER

## 2021-01-05 PROCEDURE — G8427 DOCREV CUR MEDS BY ELIG CLIN: HCPCS | Performed by: NURSE PRACTITIONER

## 2021-01-05 PROCEDURE — 3023F SPIROM DOC REV: CPT | Performed by: NURSE PRACTITIONER

## 2021-01-05 PROCEDURE — 1123F ACP DISCUSS/DSCN MKR DOCD: CPT | Performed by: NURSE PRACTITIONER

## 2021-01-05 PROCEDURE — 99214 OFFICE O/P EST MOD 30 MIN: CPT | Performed by: NURSE PRACTITIONER

## 2021-01-05 PROCEDURE — 4004F PT TOBACCO SCREEN RCVD TLK: CPT | Performed by: NURSE PRACTITIONER

## 2021-01-05 PROCEDURE — 4040F PNEUMOC VAC/ADMIN/RCVD: CPT | Performed by: NURSE PRACTITIONER

## 2021-01-05 PROCEDURE — G8417 CALC BMI ABV UP PARAM F/U: HCPCS | Performed by: NURSE PRACTITIONER

## 2021-01-05 PROCEDURE — G8926 SPIRO NO PERF OR DOC: HCPCS | Performed by: NURSE PRACTITIONER

## 2021-01-05 RX ORDER — LISINOPRIL 2.5 MG/1
2.5 TABLET ORAL DAILY
Qty: 90 TABLET | Refills: 3 | Status: SHIPPED | OUTPATIENT
Start: 2021-01-05 | End: 2021-12-15 | Stop reason: SDUPTHER

## 2021-01-05 RX ORDER — ERGOCALCIFEROL 1.25 MG/1
50000 CAPSULE ORAL WEEKLY
Qty: 12 CAPSULE | Refills: 1 | Status: SHIPPED | OUTPATIENT
Start: 2021-01-05 | End: 2021-09-07 | Stop reason: SDUPTHER

## 2021-01-05 RX ORDER — CLOPIDOGREL BISULFATE 75 MG/1
TABLET ORAL
Qty: 90 TABLET | Refills: 1 | Status: SHIPPED | OUTPATIENT
Start: 2021-01-05 | End: 2021-06-15

## 2021-01-05 RX ORDER — LOVASTATIN 20 MG/1
20 TABLET ORAL NIGHTLY
Qty: 90 TABLET | Refills: 3 | Status: SHIPPED | OUTPATIENT
Start: 2021-01-05 | End: 2021-12-15 | Stop reason: SDUPTHER

## 2021-01-05 ASSESSMENT — ENCOUNTER SYMPTOMS
SHORTNESS OF BREATH: 0
WHEEZING: 0
ABDOMINAL DISTENTION: 0
SORE THROAT: 0
NAUSEA: 0
TROUBLE SWALLOWING: 0
BACK PAIN: 1
EYE DISCHARGE: 0
CHOKING: 0
CONSTIPATION: 0
COLOR CHANGE: 0
DIARRHEA: 0
VOMITING: 0
ABDOMINAL PAIN: 0
STRIDOR: 0
EYE ITCHING: 0
COUGH: 0
BLOOD IN STOOL: 0

## 2021-01-05 ASSESSMENT — PATIENT HEALTH QUESTIONNAIRE - PHQ9
SUM OF ALL RESPONSES TO PHQ9 QUESTIONS 1 & 2: 0
SUM OF ALL RESPONSES TO PHQ QUESTIONS 1-9: 0
2. FEELING DOWN, DEPRESSED OR HOPELESS: 0

## 2021-01-05 NOTE — PATIENT INSTRUCTIONS
1.  Hypertension; Stable no changes  2. Chronic pain syndrome stable no changes   3. Cognitive complaints; Stable for now   4. Vit d def;  Stable on current meds   5. Peripheral neuropathy;  Stable on current meds with pain management   6.   COPD;  Stable  Yearly CT lung screening

## 2021-01-05 NOTE — PROGRESS NOTES
[facility] INTERNAL MEDICINE  [address] [address]  [address] [place] [address]  Dept: [phone]  Dept Fax: [phone]  Loc: [phone]    Alexandra Zaidi is a 67 y.o. male who presents today for his medical conditions/complaints as noted below. Alexandra Zaidi is c/robinson 6 Month Follow-Up (states he is doing good)        HPI:     HPI   1. Hypertension lisinopril 2.5 daily as directed no side effects of the meds  2. Chronic pain syndrome  He is on oxy with Pain management   3. Cognitive complaints; he has been on meds in the past but did not like them   4. Vit d def  Taking 50,000 units weekly level is good on labs   5. Peripheral neuropathy he takes gabapentin from pain management   6.   COPD he has no SOB exertional dyspnea     Chief Complaint   Patient presents with    6 Month Follow-Up     states he is doing good       Past Medical History:   Diagnosis Date    Adenomatous colon polyp     CAD (coronary artery disease)     LAD stent 3/2005 [place]    [facility]) [facility]     Hypertension     Male erectile dysfunction     Pure hypercholesterolemia     Sick sinus syndrome ([facility]     Vertigo       Past Surgical History:   Procedure Laterality Date    MASTOIDECTOMY      left ear 3/15 tympanomastoidectomy, Dr Tulio Bruce, [facility]       Vitals 1/5/2021 7/2/2020 1/6/2020 11/19/2019 7/10/2019 16/30/9607   SYSTOLIC 180 900 [phone]   DIASTOLIC 72 80 85 78 62 78   Pulse 72 79 100 74 74 65   Temp - - - - - -   Resp - - - 18 18 16   SpO2 97 - 97 98 97 97   Weight 208 lb 202 lb 201 lb - 200 lb 211 lb   Height 6' 0\" 6' 0\" 6' 0\" - 6' 0\" 6' 0\"   Body mass index 28.21 kg/m2 27.39 kg/m2 27.26 kg/m2 - 27.12 kg/m2 28.61 kg/m2   Some recent data might be hidden       Family History   Problem Relation Age of Onset    Breast Cancer Mother     Emphysema Father        Social History     Tobacco Use    Smoking status: Current Every Day Smoker Packs/day: 1.00     Years: 30.00     Pack years: 30.00     Types: Cigarettes    Smokeless tobacco: Never Used   Substance Use Topics    Alcohol use: No      Current Outpatient Medications   Medication Sig Dispense Refill    lisinopril (PRINIVIL;ZESTRIL) 2.5 MG tablet Take 1 tablet by mouth daily 90 tablet 3    clopidogrel (PLAVIX) 75 MG tablet 1 tablet daily 90 tablet 1    lovastatin (MEVACOR) 20 MG tablet Take 1 tablet by mouth nightly 90 tablet 3    vitamin D (ERGOCALCIFEROL) 1.25 MG (92519 UT) CAPS capsule Take 1 capsule by mouth once a week 12 capsule 1    ADVAIR DISKUS 250-50 MCG/DOSE AEPB INHALE ONE DOSE BY MOUTH TWICE DAILY 3 Inhaler 3    nitroGLYCERIN (NITROSTAT) 0.4 MG SL tablet Place 1 tablet under the tongue every 5 minutes as needed for Chest pain up to max of 3 total doses. If no relief after 1 dose, call 911. 25 tablet 1    gabapentin (NEURONTIN) 300 MG capsule TAKE 1 CAPSULE BY MOUTH EVERY 8 HOURS AS NEEDED  5    tiZANidine (ZANAFLEX) 4 MG tablet Take 1 tablet by mouth 3 times daily 30 tablet 5    Cyanocobalamin (VITAMIN B 12 PO) Take 1 tablet by mouth daily      aspirin 81 MG EC tablet Take 81 mg by mouth daily      oxyCODONE-acetaminophen (PERCOCET) 7.5-325 MG per tablet Take 1 tablet by mouth 2 times daily as needed.  fluticasone (FLONASE) 50 MCG/ACT nasal spray       ibuprofen (ADVIL;MOTRIN) 800 MG tablet Take 1 tablet by mouth 2 times daily as needed for Pain 120 tablet 1     No current facility-administered medications for this visit.       No Known Allergies    Health Maintenance   Topic Date Due    Hepatitis C screen  1945    DTaP/Tdap/Td vaccine (1 - Tdap) 01/25/2021 (Originally 4/3/1964)    Shingles Vaccine (1 of 2) 12/09/2021 (Originally 4/3/1994)    Low dose CT lung screening  07/22/2021    Annual Wellness Visit (AWV)  12/10/2021    Lipid screen  12/30/2021    Potassium monitoring  12/30/2021    Creatinine monitoring  12/30/2021    Flu vaccine  Completed  Pneumococcal 65+ years Vaccine  Completed    Hepatitis A vaccine  Aged Out    Hepatitis B vaccine  Aged Out    Hib vaccine  Aged Out    Meningococcal (ACWY) vaccine  Aged Out       No results found for: LABA1C  Lab Results   Component Value Date    PSA 1.60 12/30/2020    PSA 1.71 12/30/2019    PSA 1.49 12/26/2018     TSH   Date Value Ref Range Status   06/24/2020 2.720 0.270 - 4.200 uIU/mL Final   ]  Lab Results   Component Value Date     12/30/2020    K 4.0 12/30/2020     12/30/2020    CO2 26 12/30/2020    BUN 15 12/30/2020    CREATININE 0.8 12/30/2020    GLUCOSE 95 12/30/2020    CALCIUM 9.8 12/30/2020    PROT 7.0 12/30/2020    LABALBU 4.4 12/30/2020    BILITOT 1.3 (H) 12/30/2020    ALKPHOS 72 12/30/2020    AST 17 12/30/2020    ALT 17 12/30/2020    LABGLOM >60 12/30/2020    GFRAA >59 12/30/2020     Lab Results   Component Value Date    CHOL 109 (L) 12/30/2020    CHOL 122 (L) 12/30/2019    CHOL 109 (L) 07/05/2019     Lab Results   Component Value Date    TRIG 99 12/30/2020    TRIG 92 12/30/2019    TRIG 118 07/05/2019     Lab Results   Component Value Date    HDL 39 (L) 12/30/2020    HDL 42 (L) 12/30/2019    HDL 37 (L) 07/05/2019     Lab Results   Component Value Date    LDLCALC 50 12/30/2020    LDLCALC 62 12/30/2019    LDLCALC 48 07/05/2019     Lab Results   Component Value Date     12/30/2020    K 4.0 12/30/2020     12/30/2020    CO2 26 12/30/2020    BUN 15 12/30/2020    CREATININE 0.8 12/30/2020    GLUCOSE 95 12/30/2020    CALCIUM 9.8 12/30/2020      Lab Results   Component Value Date    WBC 4.5 (L) 12/30/2020    HGB 14.8 12/30/2020    HCT 44.9 12/30/2020    MCV 93.9 12/30/2020     12/30/2020    LYMPHOPCT 22.3 12/30/2020    RBC 4.78 12/30/2020    MCH 31.0 12/30/2020    MCHC 33.0 12/30/2020    RDW 12.4 12/30/2020     Lab Results   Component Value Date    VITD25 47.9 12/30/2020       Subjective:      Review of Systems   Constitutional: Negative for fatigue, fever and unexpected weight change. HENT: Negative for ear discharge, ear pain, mouth sores, sore throat and trouble swallowing. Eyes: Negative for discharge, itching and visual disturbance. Respiratory: Negative for cough, choking, shortness of breath, wheezing and stridor. Cardiovascular: Negative for chest pain, palpitations and leg swelling. Gastrointestinal: Negative for abdominal distention, abdominal pain, blood in stool, constipation, diarrhea, nausea and vomiting. Endocrine: Negative for cold intolerance, polydipsia and polyuria. Genitourinary: Negative for difficulty urinating, dysuria, frequency and urgency. Musculoskeletal: Positive for arthralgias and back pain. Negative for gait problem. Skin: Negative for color change and rash. Allergic/Immunologic: Negative for food allergies and immunocompromised state. Neurological: Negative for dizziness, tremors, syncope, speech difficulty, weakness and headaches. Peripheral neuropathy \  Memory impairment    Hematological: Negative for adenopathy. Does not bruise/bleed easily. Psychiatric/Behavioral: Negative for confusion and hallucinations. Objective:     Physical Exam  Constitutional:       General: He is not in acute distress. Appearance: He is well-developed. HENT:      Head: Normocephalic and atraumatic. Eyes:      General: No scleral icterus. Right eye: No discharge. Left eye: No discharge. Pupils: Pupils are equal, round, and reactive to light. Neck:      Musculoskeletal: Normal range of motion and neck supple. Thyroid: No thyromegaly. Vascular: No JVD. Cardiovascular:      Rate and Rhythm: Normal rate and regular rhythm. Heart sounds: Normal heart sounds. No murmur. Pulmonary:      Effort: Pulmonary effort is normal. No respiratory distress. Breath sounds: Normal breath sounds. No wheezing or rales. Abdominal:      General: Bowel sounds are normal. There is no distension. Palpations: Abdomen is soft. There is no mass. Tenderness: There is no abdominal tenderness. There is no guarding or rebound. Musculoskeletal: Normal range of motion. General: No tenderness. Skin:     General: Skin is warm and dry. Findings: No erythema or rash. Neurological:      Mental Status: He is alert and oriented to person, place, and time. Cranial Nerves: No cranial nerve deficit. Coordination: Coordination normal.      Deep Tendon Reflexes: Reflexes are normal and symmetric. Reflexes normal.   Psychiatric:         Mood and Affect: Mood is not depressed. Behavior: Behavior normal.         Thought Content: Thought content normal.         Judgment: Judgment normal.       /72   Pulse 72   Ht 6' (1.829 m)   Wt 208 lb (94.3 kg)   SpO2 97%   BMI 28.21 kg/m²     Assessment:       Diagnosis Orders   1. Cognitive complaints     2. Essential hypertension  CBC Auto Differential    Comprehensive Metabolic Panel    Urinalysis Reflex to Culture    TSH without Reflex   3. Memory impairment     4. Neuropathy     5. Chronic obstructive pulmonary disease, unspecified COPD type ([facility]     6. Pure hypercholesterolemia  Lipid Panel   7. Vitamin D deficiency  Vitamin D 25 Hydroxy     Labs reviewed from   Diagnostics reviewed from  2020  abd and chest CT    Plan:        Patient given educational materials - see patient instructions. Discussed use, benefit, and side effects of prescribed medications. Allpatient questions answered. Pt voiced understanding. Reviewed health maintenance. Instructed to continue current medications, diet and exercise. Patient agreed with treatment plan. Follow up as directed.    MEDICATIONS:  Orders Placed This Encounter   Medications    lisinopril (PRINIVIL;ZESTRIL) 2.5 MG tablet     Sig: Take 1 tablet by mouth daily     Dispense:  90 tablet     Refill:  3    clopidogrel (PLAVIX) 75 MG tablet     Si tablet daily     Dispense:  90 tablet Refill:  1    lovastatin (MEVACOR) 20 MG tablet     Sig: Take 1 tablet by mouth nightly     Dispense:  90 tablet     Refill:  3    vitamin D (ERGOCALCIFEROL) 1.25 MG (60809 UT) CAPS capsule     Sig: Take 1 capsule by mouth once a week     Dispense:  12 capsule     Refill:  1         ORDERS:  Orders Placed This Encounter   Procedures    CBC Auto Differential    Comprehensive Metabolic Panel    Lipid Panel    Vitamin D 25 Hydroxy    Urinalysis Reflex to Culture    TSH without Reflex       Follow-up:  Return in about 6 months (around 7/5/2021) for have labs done prior to appt. PATIENT INSTRUCTIONS:  Patient Instructions   1. Hypertension; Stable no changes  2. Chronic pain syndrome stable no changes   3. Cognitive complaints; Stable for now   4. Vit d def;  Stable on current meds   5. Peripheral neuropathy;  Stable on current meds with pain management   6. COPD;  Stable  Yearly CT lung screening    Electronically signed by ALLYN Goldberg on 1/5/2021 at 11:47 AM    EMRDragon/transcription disclaimer:  Much of this encounter note is electronic transcription/translation of spoken language to printed texts. The electronic translation of spoken language may be erroneous, or at times,nonsensical words or phrases may be inadvertently transcribed.   Although I have reviewed the note for such errors, some may still exist.

## 2021-01-27 ENCOUNTER — OFFICE VISIT (OUTPATIENT)
Dept: OTOLARYNGOLOGY | Facility: CLINIC | Age: 76
End: 2021-01-27

## 2021-01-27 VITALS — WEIGHT: 208 LBS | BODY MASS INDEX: 28.17 KG/M2 | HEIGHT: 72 IN

## 2021-01-27 DIAGNOSIS — H70.12 CHRONIC MASTOIDITIS OF LEFT SIDE: ICD-10-CM

## 2021-01-27 DIAGNOSIS — Z97.4 PRESENCE OF EXTERNAL HEARING AID: ICD-10-CM

## 2021-01-27 DIAGNOSIS — H69.83 DYSFUNCTION OF BOTH EUSTACHIAN TUBES: ICD-10-CM

## 2021-01-27 DIAGNOSIS — H72.93 PERFORATION OF BOTH TYMPANIC MEMBRANES: ICD-10-CM

## 2021-01-27 DIAGNOSIS — H92.13 CHRONIC OTORRHEA, BILATERAL: Primary | ICD-10-CM

## 2021-01-27 PROCEDURE — 69222 CLEAN OUT MASTOID CAVITY: CPT | Performed by: OTOLARYNGOLOGY

## 2021-01-27 PROCEDURE — 99213 OFFICE O/P EST LOW 20 MIN: CPT | Performed by: OTOLARYNGOLOGY

## 2021-01-27 RX ORDER — OXYCODONE HYDROCHLORIDE AND ACETAMINOPHEN 7.5; 325 MG/1; MG/1
TABLET ORAL
COMMUNITY
Start: 2020-12-30

## 2021-01-27 NOTE — PATIENT INSTRUCTIONS
EAR CARE: :using oil  Use a hair dryer on low heat and blow into the ear canals 2 times daily to keep ears dry.  DO Not use Q tips or Margie pins, ever!!  Do not use alcohol in the ear canal (this will cause dryness and itching)  NO peroxide or alcohol in ears  Oil: Use Sweet oil, Olive oil, or Mineral oil, purchased over the counter, 2 to 3 times a week, Do not use Q tips, You may use a hair dryer on low heat. Blow in ears for 10-15 seconds 2 times daily to dry ear canals or if ear canals are itching.     [email or web address]

## 2021-01-27 NOTE — PROGRESS NOTES
[facility] OTOLARYNGOLOGY, HEAD AND NECK SURGERY CLINIC NOTE    Chief Complaint   Patient presents with   • Follow-up          HPI   Follow up  Accompanied by:  No one  Prashanth Cantu is a 75 y.o. male who presents for follow up with no acute complaints.  Patient states he has had no drainage.  He still has no hearing in the left ear.  Is had no ear complaints.  He is using his right-sided hearing aid.      History     Last Reviewed by Adolfo Bah Jr., MD on 1/27/2021 at 11:11 AM    Sections Reviewed    Medical, Family, Tobacco, Surgical      Problem list reviewed by Adolfo Bah Jr., MD on 1/27/2021 at 11:11 AM  Medicines reviewed by Adolfo Bah Jr., MD on 1/27/2021 at 11:11 AM  Allergies reviewed by Adolfo Bah Jr., MD on 1/27/2021 at 11:11 AM         Vital Signs:        Physical Exam  CONSTITUTIONAL:    well nourished, well-developed, alert, oriented, in no acute distress      BODY HABITUS:    Normal body habitus     COMMUNICATION:    able to communicate normally, normal voice quality     HEAD:     Normocephalic, without obvious abnormality, atraumatic     FACE:    structure normal, no tenderness present, no lesions/masses, no evidence of trauma     SALIVARY GLANDS:    parotid glands with no tenderness, no swelling, no masses, submandibular glands with normal size, nontender      EYE:    ocular motility normal, eyelids normal, orbits normal, no proptosis, conjunctiva clear, sclera non-icteric, pupils equal, round, reactive to light and accomodation  Color:   blue     HEARING:      response to conversational voice decreased  Bilateral    abnormal:      AS: Worse      AD: augmented  Hearing aids present  Right    EARS: See procedure note    NOSE EXTERNAL:    APPEARANCE: normal, straight, with good projection, no tenderness, no lesions, no tenderness, good nasal support, patent nares     NOSE INTERNAL:    Anterior rhinoscopy   NASALMUCOSA:    intact mucosa,  no lesions    NASAL PASSAGES:      Left   normal, patent     abnormal   Right- narrowed by septum   NASAL VALVE:     intact, good support and no nasal obstruction   SEPTUM:     normal mucosa without crusting or lesions  Bilateral    deviation present:      deviated Right low and mid mild to mod   INFERIOR TURBINATES:     normal size, non-obstructing, no lesions     ORAL CAVITY:    Normal lips with no lesions, dentition normal for age, FOM intact without lesions and normal salivary flow, Mucosa intact without lesions, Hard and soft palate normal without lesions    TEETH:       edentulous:  upper and lower      Dentures   upper and lower     OROPHARYNX:    Direct examination  oropharyngeal mucosa normal, tonsil(s) with normal appearance       NECK:    normal appearance, no masses, no lesions, larynx normal mobility, trachea midline     LYMPH NODES :    no adenopathy     THYROID:    no overt thyromegaly, no tenderness, nodules or mass present on palpation, position midline     CHEST/RESPIRATORY:    respiratory effort normal, no rales, rubs or wheezing, no stridor, normal appearance to chest     CARDIOVASCULAR:    regular rate and rhythm, no murmurs, gallups, no peripheral edema     NEURO/PSYCHIATRIC :    oriented appropriately for age, mood normal, affect appropriate, cranial nerves intact grossly (unless specifically described), gait normal for age  RESULTS REVIEW:    I have reviewed the patients old records in the chart.    Physical examination has been copied from prior note.  This is been carefully reviewed and appropriate changes have been made in the documentation.        SnapShot   Notes   Encounters  Labs   Imaging   [employer]   Rslt Rev   :23}    Result Review    RESULTS REVIEW:    I have reviewed the patients old records in the chart.            Assessment:        Diagnosis Plan   1. Chronic otorrhea, bilateral      Present today   2. Perforation of both tympanic membranes      Right side appears intact  today   3. Chronic mastoiditis of left side      Stable with affected debris bilaterally   4. Dysfunction of both eustachian tubes     5. Presence of external hearing aid      Right              Plan:        Continue current management plan.  Patient has bilateral mastoid disease.  This is managed with routine cleaning.  I have cleaned out his mastoids today.  He will continue oil in both mastoid cavities.  Ear care with oil  Hearing aid right ear  Call for any otorrhea             MY CHART:  Patient is Patient is using My Chart    Patient, Procedure and findings reviewed understand(s) and agree(s) with the treatment plan as described.    Return in about 3 months (around 4/27/2021) for Recheck Ears.            Adolfo Bah Jr, MD  01/27/21  10:59 CST

## 2021-01-27 NOTE — PROGRESS NOTES
ENT PROCEDURE NOTE:  Pre-operative Diagnosis: Mastoid accumulation with debris    Post-operative Diagnosis: same    Anesthesia: none    Procedure:  Binocular ear microscopy with cerumen removal    Side:  BILATERAL    Procedure Details:    The patient was placed supine on the procedure table. Using a speculum, the ear(s) was/were examined with the microscope.   Using Micro-instrumentation and suction, the mastoid was cleared.    Findings:  Ear canal: AU- surgicall y altered with CANAL WALL DOWN mastoid  Tympanic membrane: AS: Did perforation present with mild granulation, no drainage, TM is very thickened  AD: Atelectatic tympanic membrane but intact, no drainage  Middle Ear: Left-mild granulation, no drainage  Right-ectatic, fluid  Mastoid: Left-dried debris extending out into the external auditory canal and coating the superior canal wall as well anterior canal wall, coiled and removed today  Right-debris in the mastoid, moist debris in the external auditory canal, cleared    Condition:  Stable.  Patient tolerated procedure well.    Complications:  None  Post-procedure instructions reviewed with Patient, Procedure and findings reviewed  Ear care initiated, instructions on AVS

## 2021-06-11 RX ORDER — FLUTICASONE PROPIONATE 50 MCG
SPRAY, SUSPENSION (ML) NASAL
Qty: 16 G | Refills: 0 | Status: SHIPPED | OUTPATIENT
Start: 2021-06-11

## 2021-06-15 RX ORDER — CLOPIDOGREL BISULFATE 75 MG/1
TABLET ORAL
Qty: 90 TABLET | Refills: 0 | Status: SHIPPED | OUTPATIENT
Start: 2021-06-15 | End: 2021-07-01 | Stop reason: SDUPTHER

## 2021-06-15 NOTE — TELEPHONE ENCOUNTER
[address] called requesting a refill of the below medication which has been pended for you:     Requested Prescriptions     Pending Prescriptions Disp Refills    clopidogrel (PLAVIX) 75 MG tablet [Pharmacy Med Name: Clopidogrel Bisulfate 75 MG Oral Tablet] 90 tablet 0     Sig: Take 1 tablet by mouth once daily       Last Appointment Date: 1/5/2021  Next Appointment Date: 7/1/2021    No Known Allergies

## 2021-06-25 DIAGNOSIS — E78.00 PURE HYPERCHOLESTEROLEMIA: ICD-10-CM

## 2021-06-25 DIAGNOSIS — E55.9 VITAMIN D DEFICIENCY: ICD-10-CM

## 2021-06-25 DIAGNOSIS — I10 ESSENTIAL HYPERTENSION: ICD-10-CM

## 2021-06-25 LAB
ALBUMIN SERPL-MCNC: 4.4 G/DL (ref 3.5–5.2)
ALP BLD-CCNC: 67 U/L (ref 40–130)
ALT SERPL-CCNC: 15 U/L (ref 5–41)
ANION GAP SERPL CALCULATED.3IONS-SCNC: 10 MMOL/L (ref 7–19)
AST SERPL-CCNC: 21 U/L (ref 5–40)
BACTERIA: NEGATIVE /HPF
BASOPHILS ABSOLUTE: 0.1 K/UL (ref 0–0.2)
BASOPHILS RELATIVE PERCENT: 0.9 % (ref 0–1)
BILIRUB SERPL-MCNC: 1.2 MG/DL (ref 0.2–1.2)
BILIRUBIN URINE: NEGATIVE
BLOOD, URINE: NEGATIVE
BUN BLDV-MCNC: 20 MG/DL (ref 8–23)
CALCIUM SERPL-MCNC: 9.5 MG/DL (ref 8.8–10.2)
CHLORIDE BLD-SCNC: 104 MMOL/L (ref 98–111)
CHOLESTEROL, TOTAL: 105 MG/DL (ref 160–199)
CLARITY: CLEAR
CO2: 26 MMOL/L (ref 22–29)
COLOR: YELLOW
CREAT SERPL-MCNC: 0.9 MG/DL (ref 0.5–1.2)
CRYSTALS, UA: ABNORMAL /HPF
EOSINOPHILS ABSOLUTE: 0.1 K/UL (ref 0–0.6)
EOSINOPHILS RELATIVE PERCENT: 2.1 % (ref 0–5)
EPITHELIAL CELLS, UA: 1 /HPF (ref 0–5)
GFR AFRICAN AMERICAN: >59
GFR NON-AFRICAN AMERICAN: >60
GLUCOSE BLD-MCNC: 96 MG/DL (ref 74–109)
GLUCOSE URINE: NEGATIVE MG/DL
HCT VFR BLD CALC: 43.3 % (ref 42–52)
HDLC SERPL-MCNC: 37 MG/DL (ref 55–121)
HEMOGLOBIN: 14.6 G/DL (ref 14–18)
HYALINE CASTS: 2 /HPF (ref 0–8)
IMMATURE GRANULOCYTES #: 0 K/UL
KETONES, URINE: NEGATIVE MG/DL
LDL CHOLESTEROL CALCULATED: 50 MG/DL
LEUKOCYTE ESTERASE, URINE: ABNORMAL
LYMPHOCYTES ABSOLUTE: 1.8 K/UL (ref 1.1–4.5)
LYMPHOCYTES RELATIVE PERCENT: 33.9 % (ref 20–40)
MCH RBC QN AUTO: 31.1 PG (ref 27–31)
MCHC RBC AUTO-ENTMCNC: 33.7 G/DL (ref 33–37)
MCV RBC AUTO: 92.1 FL (ref 80–94)
MONOCYTES ABSOLUTE: 0.5 K/UL (ref 0–0.9)
MONOCYTES RELATIVE PERCENT: 9 % (ref 0–10)
NEUTROPHILS ABSOLUTE: 2.9 K/UL (ref 1.5–7.5)
NEUTROPHILS RELATIVE PERCENT: 53.7 % (ref 50–65)
NITRITE, URINE: NEGATIVE
PDW BLD-RTO: 12.4 % (ref 11.5–14.5)
PH UA: 5.5 (ref 5–8)
PLATELET # BLD: 209 K/UL (ref 130–400)
PMV BLD AUTO: 9.9 FL (ref 9.4–12.4)
POTASSIUM SERPL-SCNC: 4.4 MMOL/L (ref 3.5–5)
PROTEIN UA: ABNORMAL MG/DL
RBC # BLD: 4.7 M/UL (ref 4.7–6.1)
RBC UA: 3 /HPF (ref 0–4)
SODIUM BLD-SCNC: 140 MMOL/L (ref 136–145)
SPECIFIC GRAVITY UA: 1.03 (ref 1–1.03)
TOTAL PROTEIN: 6.9 G/DL (ref 6.6–8.7)
TRIGL SERPL-MCNC: 89 MG/DL (ref 0–149)
TSH SERPL DL<=0.05 MIU/L-ACNC: 2.09 UIU/ML (ref 0.27–4.2)
UROBILINOGEN, URINE: 1 E.U./DL
VITAMIN D 25-HYDROXY: 49.2 NG/ML
WBC # BLD: 5.3 K/UL (ref 4.8–10.8)
WBC UA: 5 /HPF (ref 0–5)

## 2021-06-30 PROBLEM — H90.72 MIXED HEARING LOSS OF LEFT EAR: Status: ACTIVE | Noted: 2017-11-06

## 2021-06-30 PROBLEM — I10 HTN (HYPERTENSION), BENIGN: Status: ACTIVE | Noted: 2019-01-10

## 2021-07-01 ENCOUNTER — OFFICE VISIT (OUTPATIENT)
Dept: INTERNAL MEDICINE | Age: 76
End: 2021-07-01
Payer: MEDICARE

## 2021-07-01 VITALS
HEART RATE: 70 BPM | WEIGHT: 204 LBS | HEIGHT: 72 IN | SYSTOLIC BLOOD PRESSURE: 124 MMHG | BODY MASS INDEX: 27.63 KG/M2 | OXYGEN SATURATION: 97 % | DIASTOLIC BLOOD PRESSURE: 62 MMHG

## 2021-07-01 DIAGNOSIS — I10 HTN (HYPERTENSION), BENIGN: Primary | ICD-10-CM

## 2021-07-01 DIAGNOSIS — Z12.5 ENCOUNTER FOR PROSTATE CANCER SCREENING: ICD-10-CM

## 2021-07-01 DIAGNOSIS — R35.0 BENIGN PROSTATIC HYPERPLASIA WITH URINARY FREQUENCY: ICD-10-CM

## 2021-07-01 DIAGNOSIS — H66.90 EAR INFECTION: ICD-10-CM

## 2021-07-01 DIAGNOSIS — N40.1 BENIGN PROSTATIC HYPERPLASIA WITH URINARY FREQUENCY: ICD-10-CM

## 2021-07-01 DIAGNOSIS — Z00.00 WELL ADULT EXAM: ICD-10-CM

## 2021-07-01 DIAGNOSIS — K21.9 GASTROESOPHAGEAL REFLUX DISEASE WITHOUT ESOPHAGITIS: ICD-10-CM

## 2021-07-01 DIAGNOSIS — Z87.891 PERSONAL HISTORY OF TOBACCO USE: ICD-10-CM

## 2021-07-01 DIAGNOSIS — G62.9 NEUROPATHY: ICD-10-CM

## 2021-07-01 DIAGNOSIS — E78.00 PURE HYPERCHOLESTEROLEMIA: ICD-10-CM

## 2021-07-01 DIAGNOSIS — I71.40 ABDOMINAL AORTIC ANEURYSM (AAA) WITHOUT RUPTURE: ICD-10-CM

## 2021-07-01 DIAGNOSIS — H65.91 RIGHT NON-SUPPURATIVE OTITIS MEDIA: ICD-10-CM

## 2021-07-01 DIAGNOSIS — E55.9 VITAMIN D DEFICIENCY: ICD-10-CM

## 2021-07-01 DIAGNOSIS — Z13.6 SCREENING FOR AAA (ABDOMINAL AORTIC ANEURYSM): ICD-10-CM

## 2021-07-01 DIAGNOSIS — I25.10 CHRONIC CORONARY ARTERY DISEASE: ICD-10-CM

## 2021-07-01 DIAGNOSIS — G89.4 CHRONIC PAIN SYNDROME: ICD-10-CM

## 2021-07-01 PROCEDURE — G0296 VISIT TO DETERM LDCT ELIG: HCPCS | Performed by: NURSE PRACTITIONER

## 2021-07-01 PROCEDURE — G8417 CALC BMI ABV UP PARAM F/U: HCPCS | Performed by: NURSE PRACTITIONER

## 2021-07-01 PROCEDURE — 99214 OFFICE O/P EST MOD 30 MIN: CPT | Performed by: NURSE PRACTITIONER

## 2021-07-01 PROCEDURE — 4004F PT TOBACCO SCREEN RCVD TLK: CPT | Performed by: NURSE PRACTITIONER

## 2021-07-01 PROCEDURE — 1123F ACP DISCUSS/DSCN MKR DOCD: CPT | Performed by: NURSE PRACTITIONER

## 2021-07-01 PROCEDURE — 4040F PNEUMOC VAC/ADMIN/RCVD: CPT | Performed by: NURSE PRACTITIONER

## 2021-07-01 PROCEDURE — G8427 DOCREV CUR MEDS BY ELIG CLIN: HCPCS | Performed by: NURSE PRACTITIONER

## 2021-07-01 RX ORDER — CLOPIDOGREL BISULFATE 75 MG/1
TABLET ORAL
Qty: 90 TABLET | Refills: 0 | Status: SHIPPED | OUTPATIENT
Start: 2021-07-01 | End: 2021-12-15 | Stop reason: SDUPTHER

## 2021-07-01 RX ORDER — TAMSULOSIN HYDROCHLORIDE 0.4 MG/1
0.4 CAPSULE ORAL DAILY
Qty: 30 CAPSULE | Refills: 5 | Status: CANCELLED | OUTPATIENT
Start: 2021-07-01

## 2021-07-01 RX ORDER — TAMSULOSIN HYDROCHLORIDE 0.4 MG/1
0.4 CAPSULE ORAL DAILY
Qty: 90 CAPSULE | Refills: 1 | Status: SHIPPED | OUTPATIENT
Start: 2021-07-01 | End: 2022-06-13

## 2021-07-01 SDOH — ECONOMIC STABILITY: FOOD INSECURITY: WITHIN THE PAST 12 MONTHS, THE FOOD YOU BOUGHT JUST DIDN'T LAST AND YOU DIDN'T HAVE MONEY TO GET MORE.: NEVER TRUE

## 2021-07-01 SDOH — ECONOMIC STABILITY: FOOD INSECURITY: WITHIN THE PAST 12 MONTHS, YOU WORRIED THAT YOUR FOOD WOULD RUN OUT BEFORE YOU GOT MONEY TO BUY MORE.: NEVER TRUE

## 2021-07-01 ASSESSMENT — ENCOUNTER SYMPTOMS
DIARRHEA: 0
SORE THROAT: 0
NAUSEA: 0
COUGH: 0
WHEEZING: 0
VOMITING: 0
COLOR CHANGE: 0
EYE ITCHING: 0
CONSTIPATION: 0
BLOOD IN STOOL: 0
CHOKING: 0
EYE DISCHARGE: 0
SHORTNESS OF BREATH: 0
ABDOMINAL DISTENTION: 0
STRIDOR: 0
TROUBLE SWALLOWING: 0
ABDOMINAL PAIN: 0

## 2021-07-01 ASSESSMENT — SOCIAL DETERMINANTS OF HEALTH (SDOH): HOW HARD IS IT FOR YOU TO PAY FOR THE VERY BASICS LIKE FOOD, HOUSING, MEDICAL CARE, AND HEATING?: NOT HARD AT ALL

## 2021-07-01 NOTE — ASSESSMENT & PLAN NOTE
This looks like yeast we did do a culture but we will have to send him some here medications for that we sent for a wet prep

## 2021-07-01 NOTE — ASSESSMENT & PLAN NOTE
His stent was in 1824 we can certainly let him do the Plavix 4 days a week   He no longer sees cardiology and he does not want to get reestablished with anyone.

## 2021-07-01 NOTE — PROGRESS NOTES
[address] [facility]  [address] [address] [place] [address]  [address] [place] [address]  Dept: [phone]  Dept Fax: [phone]  Loc: [phone]    Marielos Duvall (:  1945) is a 67 y.o. male,Established patient, here for evaluation of the following chief complaint(s): 6 Month Follow-Up (arthritis in both hands), Urinary Frequency, and Gastroesophageal Reflux      Marielos Duvall is a 67 y.o. male who presents today for his medical conditions/complaints as noted below. Marielos Duvall is c/robinson 6 Month Follow-Up (arthritis in both hands), Urinary Frequency, and Gastroesophageal Reflux        HPI:     Chief Complaint   Patient presents with    6 Month Follow-Up     arthritis in both hands    Urinary Frequency    Gastroesophageal Reflux     HPI     1. Hypertension currently on lisinopril 2.5 daily blood pressure is good today   #2 abdominal aortic aneurysm he had a marginal ultrasound last year with being at the limits to determine her aneurysm we will do yearly's and he is doing in July so we will get this set up for him   #3 neuropathy whichhe does get gabapentin from pain management  4. Coronary artery disease ;  refil plavix he has never seen cardiology he does have a lot of bruising  #5 hyperlipidemia next he is on Mevacor 20 nightly labs are good no changes are necessary   #6 vitamin D deficiency he is on supplemental vitamin D is level is good no changes are necessary  7. Chronic pain syndrome he is seen by pain management he has no new issues  8. Pain with arthritis  Was given difloc 75 bid from ortho for the pain in his hands  9  GERD this is seem to worsen for him at times it but he just burps that he does feel better. 10  Frequent urination when fitrt getting up in the am he has to pee several times   11  Ear feel full on the right. He does wear hearing aid in that ear. He says it feels like there is water in there.   His left ear has had previous surgery for hearing loss and it did not help.   Past Medical History:   Diagnosis Date    Adenomatous colon polyp     CAD (coronary artery disease)     LAD stent 3/2005 [facility]    COPD [facility] obstructive pulmonary disease) [facility]     Hypertension     Male erectile dysfunction     Pure hypercholesterolemia     Sick sinus syndrome ([facility]     Vertigo       Past Surgical History:   Procedure Laterality Date    MASTOIDECTOMY      left ear 3/15 tympanomastoidectomy, Dr Marquita Sagastume, [facility]       Vitals 7/1/2021 1/5/2021 7/2/2020 1/6/2020 11/19/2019 3/81/8614   SYSTOLIC 841 122 806 610 060 486   DIASTOLIC 62 72 80 85 78 62   Pulse 70 72 79 100 74 74   Temp - - - - - -   Resp - - - - 18 18   SpO2 97 97 - 97 98 97   Weight 204 lb 208 lb 202 lb 201 lb - 200 lb   Height 6' 0\" 6' 0\" 6' 0\" 6' 0\" - 6' 0\"   Body mass index 27.66 kg/m2 28.21 kg/m2 27.39 kg/m2 27.26 kg/m2 - 27.12 kg/m2   Some recent data might be hidden       Family History   Problem Relation Age of Onset    Breast Cancer Mother     Emphysema Father        Social History     Tobacco Use    Smoking status: Current Every Day Smoker     Packs/day: 1.00     Years: 30.00     Pack years: 30.00     Types: Cigarettes    Smokeless tobacco: Never Used   Substance Use Topics    Alcohol use: No      Current Outpatient Medications   Medication Sig Dispense Refill    clopidogrel (PLAVIX) 75 MG tablet Take 1 tablet by mouth once daily 90 tablet 0    tamsulosin (FLOMAX) 0.4 MG capsule Take 1 capsule by mouth daily 90 capsule 1    lisinopril (PRINIVIL;ZESTRIL) 2.5 MG tablet Take 1 tablet by mouth daily 90 tablet 3    lovastatin (MEVACOR) 20 MG tablet Take 1 tablet by mouth nightly 90 tablet 3    vitamin D (ERGOCALCIFEROL) 1.25 MG (70824 UT) CAPS capsule Take 1 capsule by mouth once a week 12 capsule 1    ADVAIR DISKUS 250-50 MCG/DOSE AEPB INHALE ONE DOSE BY MOUTH TWICE DAILY 3 Inhaler 3    nitroGLYCERIN (NITROSTAT) 0.4 MG SL tablet Place 1 tablet under the tongue every 5 minutes as needed for Chest pain up to max of 3 total doses. If no relief after 1 dose, call 911. 25 tablet 1    gabapentin (NEURONTIN) 300 MG capsule TAKE 1 CAPSULE BY MOUTH EVERY 8 HOURS AS NEEDED  5    tiZANidine (ZANAFLEX) 4 MG tablet Take 1 tablet by mouth 3 times daily 30 tablet 5    Cyanocobalamin (VITAMIN B 12 PO) Take 1 tablet by mouth daily      aspirin 81 MG EC tablet Take 81 mg by mouth daily      oxyCODONE-acetaminophen (PERCOCET) 7.5-325 MG per tablet Take 1 tablet by mouth 2 times daily as needed.  fluticasone (FLONASE) 50 MCG/ACT nasal spray       ibuprofen (ADVIL;MOTRIN) 800 MG tablet Take 1 tablet by mouth 2 times daily as needed for Pain 120 tablet 1     No current facility-administered medications for this visit.      No Known Allergies    Health Maintenance   Topic Date Due    Hepatitis C screen  Never done    DTaP/Tdap/Td vaccine (1 - Tdap) Never done    Shingles Vaccine (1 of 2) 12/09/2021 (Originally 4/3/1994)    COVID-19 Vaccine (1) 07/11/2022 (Originally 4/3/1957)    Low dose CT lung screening  07/22/2021    Flu vaccine (1) 09/01/2021    Annual Wellness Visit (AWV)  12/10/2021    Lipid screen  06/25/2022    Potassium monitoring  06/25/2022    Creatinine monitoring  06/25/2022    Pneumococcal 65+ years Vaccine  Completed    Hepatitis A vaccine  Aged Out    Hepatitis B vaccine  Aged Out    Hib vaccine  Aged Out    Meningococcal (ACWY) vaccine  Aged Out       No results found for: LABA1C  Lab Results   Component Value Date    PSA 1.60 12/30/2020    PSA 1.71 12/30/2019    PSA 1.49 12/26/2018     TSH   Date Value Ref Range Status   06/25/2021 2.090 0.270 - 4.200 uIU/mL Final   ]  Lab Results   Component Value Date     06/25/2021    K 4.4 06/25/2021     06/25/2021    CO2 26 06/25/2021    BUN 20 06/25/2021    CREATININE 0.9 06/25/2021    GLUCOSE 96 06/25/2021    CALCIUM 9.5 06/25/2021    PROT 6.9 06/25/2021    LABALBU 4.4 06/25/2021    BILITOT 1.2 06/25/2021 ALKPHOS 67 06/25/2021    AST 21 06/25/2021    ALT 15 06/25/2021    LABGLOM >60 06/25/2021    GFRAA >59 06/25/2021     Lab Results   Component Value Date    CHOL 105 (L) 06/25/2021    CHOL 109 (L) 12/30/2020    CHOL 122 (L) 12/30/2019     Lab Results   Component Value Date    TRIG 89 06/25/2021    TRIG 99 12/30/2020    TRIG 92 12/30/2019     Lab Results   Component Value Date    HDL 37 (L) 06/25/2021    HDL 39 (L) 12/30/2020    HDL 42 (L) 12/30/2019     Lab Results   Component Value Date    LDLCALC 50 06/25/2021    LDLCALC 50 12/30/2020    LDLCALC 62 12/30/2019     Lab Results   Component Value Date     06/25/2021    K 4.4 06/25/2021     06/25/2021    CO2 26 06/25/2021    BUN 20 06/25/2021    CREATININE 0.9 06/25/2021    GLUCOSE 96 06/25/2021    CALCIUM 9.5 06/25/2021      Lab Results   Component Value Date    WBC 5.3 06/25/2021    HGB 14.6 06/25/2021    HCT 43.3 06/25/2021    MCV 92.1 06/25/2021     06/25/2021    LYMPHOPCT 33.9 06/25/2021    RBC 4.70 06/25/2021    MCH 31.1 (H) 06/25/2021    MCHC 33.7 06/25/2021    RDW 12.4 06/25/2021     Lab Results   Component Value Date    VITD25 49.2 06/25/2021     Labs reviewed from 6/25/2021    Subjective:      Review of Systems   Constitutional: Negative for fatigue, fever and unexpected weight change. HENT: Positive for ear pain. Negative for ear discharge, mouth sores, sore throat and trouble swallowing. Eyes: Negative for discharge, itching and visual disturbance. Respiratory: Negative for cough, choking, shortness of breath, wheezing and stridor. Cardiovascular: Negative for chest pain, palpitations and leg swelling. Gastrointestinal: Negative for abdominal distention, abdominal pain, blood in stool, constipation, diarrhea, nausea and vomiting. Gerd     Endocrine: Negative for cold intolerance, polydipsia and polyuria. Genitourinary: Positive for frequency. Negative for difficulty urinating, dysuria and urgency.    Musculoskeletal: Positive for arthralgias. Negative for gait problem. Skin: Negative for color change and rash. Allergic/Immunologic: Negative for food allergies and immunocompromised state. Neurological: Negative for dizziness, tremors, syncope, speech difficulty, weakness and headaches. Hematological: Negative for adenopathy. Does not bruise/bleed easily. Psychiatric/Behavioral: Negative for confusion and hallucinations. Objective:     Physical Exam  Constitutional:       General: He is not in acute distress. Appearance: He is well-developed. HENT:      Head: Normocephalic and atraumatic. Eyes:      General: No scleral icterus. Right eye: No discharge. Left eye: No discharge. Pupils: Pupils are equal, round, and reactive to light. Neck:      Thyroid: No thyromegaly. Vascular: No JVD. Cardiovascular:      Rate and Rhythm: Normal rate and regular rhythm. Heart sounds: Normal heart sounds. No murmur heard. Pulmonary:      Effort: Pulmonary effort is normal. No respiratory distress. Breath sounds: Normal breath sounds. No wheezing or rales. Abdominal:      General: Bowel sounds are normal. There is no distension. Palpations: Abdomen is soft. There is no mass. Tenderness: There is no abdominal tenderness. There is no guarding or rebound. Musculoskeletal:         General: No tenderness. Normal range of motion. Cervical back: Normal range of motion and neck supple. Skin:     General: Skin is warm and dry. Findings: No erythema or rash. Neurological:      Mental Status: He is alert and oriented to person, place, and time. Cranial Nerves: No cranial nerve deficit. Coordination: Coordination normal.      Deep Tendon Reflexes: Reflexes are normal and symmetric. Reflexes normal.   Psychiatric:         Mood and Affect: Mood is not depressed. Behavior: Behavior normal.         Thought Content:  Thought content normal. Judgment: Judgment normal.       /62   Pulse 70   Ht 6' (1.829 m)   Wt 204 lb (92.5 kg)   SpO2 97%   BMI 27.67 kg/m²           Assessment:      Problem List     Abdominal aortic aneurysm (AAA) without rupture (HCC)    Benign prostatic hyperplasia with urinary frequency     We will start Flomax cautions given for syncope          Chronic coronary artery disease     His stent was in 3392 we can certainly let him do the Plavix 4 days a week   He no longer sees cardiology and he does not want to get reestablished with anyone. Relevant Medications    aspirin 81 MG EC tablet    nitroGLYCERIN (NITROSTAT) 0.4 MG SL tablet    lisinopril (PRINIVIL;ZESTRIL) 2.5 MG tablet    lovastatin (MEVACOR) 20 MG tablet    Chronic pain syndrome     Stable with pain management          Relevant Medications    ibuprofen (ADVIL;MOTRIN) 800 MG tablet    aspirin 81 MG EC tablet    oxyCODONE-acetaminophen (PERCOCET) 7.5-325 MG per tablet    tiZANidine (ZANAFLEX) 4 MG tablet    gabapentin (NEURONTIN) 300 MG capsule    Gastroesophageal reflux disease without esophagitis     We will just adds occasional Tums as this is not an everyday event for him.           HTN (hypertension), benign - Primary    Relevant Orders    CBC Auto Differential    Comprehensive Metabolic Panel    Urinalysis Reflex to Culture    TSH without Reflex    Neuropathy     Followed by pain management          Pure hypercholesterolemia     Triglycerides and cholesterol are good on Mevacor 20 at bedtime          Relevant Medications    aspirin 81 MG EC tablet    nitroGLYCERIN (NITROSTAT) 0.4 MG SL tablet    lisinopril (PRINIVIL;ZESTRIL) 2.5 MG tablet    lovastatin (MEVACOR) 20 MG tablet    Other Relevant Orders    Lipid Panel    Right non-suppurative otitis media     This looks like yeast we did do a culture but we will have to send him some here medications for that we sent for a wet prep          Vitamin D deficiency     Stable with over-the-counter meds Relevant Orders    Vitamin D 25 Hydroxy          Plan:        Patient given educational materials - see patient instructions. Discussed use, benefit, and side effects of prescribed medications. Allpatient questions answered. Pt voiced understanding. Reviewed health maintenance. Instructed to continue current medications, diet and exercise. Patient agreed with treatment plan. Follow up as directed. MEDICATIONS:  Orders Placed This Encounter   Medications    clopidogrel (PLAVIX) 75 MG tablet     Sig: Take 1 tablet by mouth once daily     Dispense:  90 tablet     Refill:  0    tamsulosin (FLOMAX) 0.4 MG capsule     Sig: Take 1 capsule by mouth daily     Dispense:  90 capsule     Refill:  1         ORDERS:  Orders Placed This Encounter   Procedures    KOH (SKIN,HAIR,NAILS)    US ABDOMINAL AORTA LIMITED    CT Lung Screen (Annual)    CBC Auto Differential    Comprehensive Metabolic Panel    Lipid Panel    Vitamin D 25 Hydroxy    Urinalysis Reflex to Culture    TSH without Reflex    PSA screening    Hepatitis C Antibody    CT VISIT TO DISCUSS LUNG CA SCREEN W LDCT       Follow-up:  Return in about 6 months (around 1/1/2022). PATIENT INSTRUCTIONS:  Patient Instructions   1. Hypertension stable no changes   #2 abdominal aortic aneurysm  Screening ordered  #3 neuropathy followed by pain management  4. Coronary artery disease decrease plavix to 4 times a week;   #5 hyperlipidemia stable with Mevacor labs are good   #6 vitamin D deficiency continue same meds labs are good  7. Chronic pain syndrome followed by pain management  8. Smoker Ct chest annual   What is lung cancer screening? Lung cancer screening is a way in which doctors check the lungs for early signs of cancer in people who have no symptoms of lung cancer. A low-dose CT scan uses much less radiation than a normal CT scan and shows a more detailed image of the lungs than a standard X-ray.   The goal of lung cancer screening is to find cancer early, before it has a chance to grow, spread, or cause problems. One large study found that smokers who were screened with low-dose CT scans were less likely to die of lung cancer than those who were screened with standard X-ray. Below is a summary of the things you need to know regarding screening for lung cancer with low-dose computed tomography (LDCT). This is a screening program that involves routine annual screening with LDCT studies of the lung. The LDCTs are done using low-dose radiation that is not thought to increase your cancer risk. If you have other serious medical conditions (other cancers, congestive heart failure) that limit your life expectancy to less than 10 years, you should not undergo lung cancer screening with LDCT. The chance is 20%-60% that the LDCT result will show abnormalities. This would require additional testing which could include repeat imaging or even invasive procedures. Most (about 95%) of \"abnormal\" LDCT results are false in the sense that no lung cancer is ultimately found. Additionally, some (about 10%) of the cancers found would not affect your life expectancy, even if undetected and untreated. If you are still smoking, the single most important thing that you can do to reduce your risk of dying of lung cancer is to quit. For this screening to be covered by Medicare and most other insurers, strict criteria must be met. If you do not meet these criteria, but still wish to undergo LDCT testing, you will be required to sign a waiver indicating your willingness to pay for the scan. Arthritis pain you can take the diclofenac 1 time a day. 9 GERD I would just take Tums for now if this becomes more frequent then we will go to [facility] daily  10. Frequent urination especially with first getting up in the morning we will try Flomax with careful instructions regarding syncope if that were to happen we would just stop the lisinopril  11.   Right otitis media we have sent a culture because this looks like it is yeast.    Electronically signed by ALLYN Martinez on 7/1/2021 at 12:20 PM      EMRDragon/transcription disclaimer:  Much of this encounter note is electronic transcription/translation of spoken language to printed texts. The electronic translation of spoken language may be erroneous, or at times,nonsensical words or phrases may be inadvertently transcribed. Although I have reviewed the note for such errors, some may still exist.  Low Dose CT (LDCT) Lung Screening criteria met   Age 50-69   Pack year smoking >30   Still smoking or less than 15 year since quit   No sign or symptoms of lung cancer   > 11 months since last LDCT     Risks and benefits of lung cancer screening with LDCT scans discussed:    Significance of positive screen - False-positive LDCT results often occur. 95% of all positive results do not lead to a diagnosis of cancer. Usually further imaging can resolve most false-positive results; however, some patients may require invasive procedures. Over diagnosis risk - 10% to 12% of screen-detected lung cancer cases are over diagnosed--that is, the cancer would not have been detected in the patient's lifetime without the screening. Need for follow up screens annually to continue lung cancer screening effectiveness     Risks associated with radiation from annual LDCT- Radiation exposure is about the same as for a mammogram, which is about 1/3 of the annual background radiation exposure from everyday life. Starting screening at age 54 is not likely to increase cancer risk from radiation exposure. Patients with comorbidities resulting in life expectancy of < 10 years, or that would preclude treatment of an abnormality identified on CT, should not be screened due to lack of benefit.     To obtain maximal benefit from this screening, smoking cessation and long-term abstinence from smoking is critical

## 2021-07-01 NOTE — PATIENT INSTRUCTIONS
1.  Hypertension stable no changes   #2 abdominal aortic aneurysm  Screening ordered  #3 neuropathy followed by pain management  4. Coronary artery disease decrease plavix to 4 times a week;   #5 hyperlipidemia stable with Mevacor labs are good   #6 vitamin D deficiency continue same meds labs are good  7. Chronic pain syndrome followed by pain management  8. Smoker Ct chest annual   What is lung cancer screening? Lung cancer screening is a way in which doctors check the lungs for early signs of cancer in people who have no symptoms of lung cancer. A low-dose CT scan uses much less radiation than a normal CT scan and shows a more detailed image of the lungs than a standard X-ray. The goal of lung cancer screening is to find cancer early, before it has a chance to grow, spread, or cause problems. One large study found that smokers who were screened with low-dose CT scans were less likely to die of lung cancer than those who were screened with standard X-ray. Below is a summary of the things you need to know regarding screening for lung cancer with low-dose computed tomography (LDCT). This is a screening program that involves routine annual screening with LDCT studies of the lung. The LDCTs are done using low-dose radiation that is not thought to increase your cancer risk. If you have other serious medical conditions (other cancers, congestive heart failure) that limit your life expectancy to less than 10 years, you should not undergo lung cancer screening with LDCT. The chance is 20%-60% that the LDCT result will show abnormalities. This would require additional testing which could include repeat imaging or even invasive procedures. Most (about 95%) of \"abnormal\" LDCT results are false in the sense that no lung cancer is ultimately found. Additionally, some (about 10%) of the cancers found would not affect your life expectancy, even if undetected and untreated.   If you are still smoking, the single most important thing that you can do to reduce your risk of dying of lung cancer is to quit. For this screening to be covered by Medicare and most other insurers, strict criteria must be met. If you do not meet these criteria, but still wish to undergo LDCT testing, you will be required to sign a waiver indicating your willingness to pay for the scan. Arthritis pain you can take the diclofenac 1 time a day. 9 GERD I would just take Tums for now if this becomes more frequent then [facility]  10. Frequent urination especially with first getting up in the morning we will try Flomax with careful instructions regarding syncope if that were to happen we would just stop the lisinopril  11.   Right otitis media we have sent a culture because this looks like it is yeast.

## 2021-07-22 ENCOUNTER — HOSPITAL ENCOUNTER (OUTPATIENT)
Dept: ULTRASOUND IMAGING | Age: 76
Discharge: HOME | End: 2021-07-22
Payer: MEDICARE

## 2021-07-22 ENCOUNTER — HOSPITAL ENCOUNTER (OUTPATIENT)
Dept: CT IMAGING | Age: 76
Discharge: HOME | End: 2021-07-22
Payer: MEDICARE

## 2021-07-22 DIAGNOSIS — Z13.6 SCREENING FOR AAA (ABDOMINAL AORTIC ANEURYSM): ICD-10-CM

## 2021-07-22 DIAGNOSIS — Z87.891 PERSONAL HISTORY OF TOBACCO USE: ICD-10-CM

## 2021-07-22 PROCEDURE — 71271 CT THORAX LUNG CANCER SCR C-: CPT

## 2021-07-22 PROCEDURE — 76775 US EXAM ABDO BACK WALL LIM: CPT

## 2021-09-07 ENCOUNTER — TELEPHONE (OUTPATIENT)
Dept: INTERNAL MEDICINE | Age: 76
End: 2021-09-07

## 2021-09-07 RX ORDER — FLUTICASONE PROPIONATE 50 MCG
2 SPRAY, SUSPENSION (ML) NASAL DAILY
Qty: 16 G | Refills: 5 | Status: SHIPPED | OUTPATIENT
Start: 2021-09-07 | End: 2021-09-08 | Stop reason: SDUPTHER

## 2021-09-07 RX ORDER — ERGOCALCIFEROL 1.25 MG/1
50000 CAPSULE ORAL WEEKLY
Qty: 12 CAPSULE | Refills: 1 | Status: SHIPPED | OUTPATIENT
Start: 2021-09-07 | End: 2021-09-08 | Stop reason: SDUPTHER

## 2021-09-07 NOTE — TELEPHONE ENCOUNTER
Patient would like a rx for Flonase sent to [facility]. ENT prescribed him Flonase years ago but he no longer goes to ENT. He also needs a rx for Vit D. He no longer uses [facility].

## 2021-09-07 NOTE — TELEPHONE ENCOUNTER
Abi Friend called requesting a refill of the below medication which has been pended for you:     Requested Prescriptions     Pending Prescriptions Disp Refills    fluticasone (FLONASE) 50 MCG/ACT nasal spray 16 g 5     Si sprays by Nasal route daily    vitamin D (ERGOCALCIFEROL) 1.25 MG (83484 UT) CAPS capsule 12 capsule 1     Sig: Take 1 capsule by mouth once a week       Last Appointment Date: 2021  Next Appointment Date: 12/15/2021    No Known Allergies

## 2021-09-08 RX ORDER — ERGOCALCIFEROL 1.25 MG/1
50000 CAPSULE ORAL WEEKLY
Qty: 12 CAPSULE | Refills: 1 | Status: SHIPPED | OUTPATIENT
Start: 2021-09-08 | End: 2022-09-06

## 2021-09-08 RX ORDER — FLUTICASONE PROPIONATE 50 MCG
2 SPRAY, SUSPENSION (ML) NASAL DAILY
Qty: 16 G | Refills: 5 | Status: SHIPPED | OUTPATIENT
Start: 2021-09-08 | End: 2021-11-08 | Stop reason: SDUPTHER

## 2021-09-08 NOTE — TELEPHONE ENCOUNTER
----- Message from Darci Barron sent at 9/2/2021  9:48 AM CDT -----  Subject: Message to Provider    QUESTIONS  Information for Provider? patient wanted to know if he can get a scrip   written for a fluticasone 2 spray daily 50 mcg, patient used to get this   script for a doctor that he does not se anymore but he has used it for   years and wanted to know if his pcp can write him a scrip and send it over   to [place] in   ---------------------------------------------------------------------------  --------------  [address]  What is the best way for the office to contact you? OK to leave message on   voicemail  Preferred Call Back Phone Number? [phone]  ---------------------------------------------------------------------------  --------------  SCRIPT ANSWERS  Relationship to Patient?  Self

## 2021-11-05 ENCOUNTER — TELEPHONE (OUTPATIENT)
Dept: INTERNAL MEDICINE | Age: 76
End: 2021-11-05

## 2021-11-08 ENCOUNTER — TELEPHONE (OUTPATIENT)
Dept: INTERNAL MEDICINE | Age: 76
End: 2021-11-08

## 2021-11-08 RX ORDER — FLUTICASONE PROPIONATE 50 MCG
2 SPRAY, SUSPENSION (ML) NASAL DAILY
Qty: 16 G | Refills: 5 | Status: SHIPPED | OUTPATIENT
Start: 2021-11-08

## 2021-11-08 RX ORDER — AZELASTINE 1 MG/ML
1 SPRAY, METERED NASAL 2 TIMES DAILY
Qty: 60 ML | Refills: 5 | Status: SHIPPED | OUTPATIENT
Start: 2021-11-08

## 2021-11-08 NOTE — TELEPHONE ENCOUNTER
Juan Ramon Jones called requesting a refill of the below medication which has been pended for you:     Requested Prescriptions     Pending Prescriptions Disp Refills    fluticasone (FLONASE) 50 MCG/ACT nasal spray 16 g 5     Si sprays by Nasal route daily       Last Appointment Date: 2021  Next Appointment Date:   No Known Allergies Visit Information Date & Time Provider Department Dept. Phone Encounter #  
 6/1/2017 11:00 AM Rodri Naik MD [facility] [phone] [id number] Upcoming Health Maintenance Date Due Hepatitis C Screening 1960 FOBT Q 1 YEAR AGE 50-75 12/29/2009 BREAST CANCER SCRN MAMMOGRAM 5/31/2017 INFLUENZA AGE 9 TO ADULT 8/1/2017 PAP AKA CERVICAL CYTOLOGY 5/31/2019 Allergies as of 6/1/2017  Review Complete On: 6/1/2017 By: Rodri Naik MD  
  
 Severity Noted Reaction Type Reactions Aspirin  09/27/2011    Other (comments) \"stomach pain\" Erythromycin  09/30/2011    Rash In childhood Penicillins  09/27/2011    Rash As a child Tetracycline  09/27/2011    Nausea and Vomiting Current Immunizations  Never Reviewed No immunizations on file. Not reviewed this visit You Were Diagnosed With   
  
 Codes Comments HSV infection     ICD-10-CM: B00.9 ICD-9-CM: 054.9 Vitals BP Resp Height(growth percentile) Weight(growth percentile) BMI OB Status 130/76 (BP 1 Location: Left arm, BP Patient Position: Sitting) 18 5' 1\" (1.549 m) 142 lb 9.6 oz (64.7 kg) 26.94 kg/m2 Hysterectomy Smoking Status Former Smoker BMI and BSA Data Body Mass Index Body Surface Area  
 26.94 kg/m 2 1.67 m 2 Preferred Pharmacy Pharmacy Name Phone [id number] [phone] [facility], [address] [phone] Your Updated Medication List  
  
   
This list is accurate as of: 6/1/17 12:13 PM.  Always use your most recent med list.  
  
  
  
  
 levothyroxine 25 mcg tablet Commonly known as:  SYNTHROID Take  by mouth Daily (before breakfast). LINZESS PO Take  by mouth. M-VIT PO Take 1 Tab by mouth daily. multivitamin, stress formula tablet Commonly known as:  STRESS TAB Take 1 Tab by mouth daily. valACYclovir 1 gram tablet Commonly known as:  VALTREX Take one tablet by mouth every 12 hours Prescriptions Sent to Pharmacy Refills  
 valACYclovir (VALTREX) 1 gram tablet 0 Sig: Take one tablet by mouth every 12 hours Class: Normal  
 Pharmacy: [address] [place] [address], [address]  #: [phone] We Performed the Following PAP IG, HPV AND RFX HPV 16/30,18([id number]) [[id number] Custom] Patient Instructions Well Visit, Women 48 to 72: Care Instructions Your Care Instructions Physical exams can help you stay healthy. Your doctor has checked your overall health and may have suggested ways to take good care of yourself. He or she also may have recommended tests. At home, you can help prevent illness with healthy eating, regular exercise, and other steps. Follow-up care is a key part of your treatment and safety. Be sure to make and go to all appointments, and call your doctor if you are having problems. It's also a good idea to know your test results and keep a list of the medicines you take. How can you care for yourself at home? · Reach and stay at a healthy weight. This will lower your risk for many problems, such as obesity, diabetes, heart disease, and high blood pressure. · Get at least 30 minutes of exercise on most days of the week. Walking is a good choice. You also may want to do other activities, such as running, swimming, cycling, or playing tennis or team sports. · Do not smoke. Smoking can make health problems worse. If you need help quitting, talk to your doctor about stop-smoking programs and medicines. These can increase your chances of quitting for good. · Protect your skin from too much sun. When you're outdoors from 10 a.m. to 4 p.m., stay in the shade or cover up with clothing and a hat with a wide brim. Wear sunglasses that block UV rays. Even when it's cloudy, put broad-spectrum sunscreen (SPF 30 or higher) on any exposed skin. · See a dentist one or two times a year for checkups and to have your teeth cleaned. · Wear a seat belt in the car. · Limit alcohol to 1 drink a day. Too much alcohol can cause health problems. Follow your doctor's advice about when to have certain tests. These tests can spot problems early. · Cholesterol. Your doctor will tell you how often to have this done based on your age, family history, or other things that can increase your risk for heart attack and stroke. · Blood pressure. Have your blood pressure checked during a routine doctor visit. Your doctor will tell you how often to check your blood pressure based on your age, your blood pressure results, and other factors. · Mammogram. Ask your doctor how often you should have a mammogram, which is an X-ray of your breasts. A mammogram can spot breast cancer before it can be felt and when it is easiest to treat. · Pap test and pelvic exam. Ask your doctor how often you should have a Pap test. You may not need to have a Pap test as often as you used to. · Vision. Have your eyes checked every year or two or as often as your doctor suggests. Some experts recommend that you have yearly exams for glaucoma and other age-related eye problems starting at age 48. · Hearing. Tell your doctor if you notice any change in your hearing. You can have tests to find out how well you hear. · Diabetes. Ask your doctor whether you should have tests for diabetes. · Colon cancer. You should begin tests for colon cancer at age 48. You may have one of several tests. Your doctor will tell you how often to have tests based on your age and risk. Risks include whether you already had a precancerous polyp removed from your colon or whether your parents, sisters and brothers, or children have had colon cancer. · Thyroid disease. Talk to your doctor about whether to have your thyroid checked as part of a regular physical exam. Women have an increased chance of a thyroid problem. · Osteoporosis. You should begin tests for bone density at age 72. If you are younger than 72, ask your doctor whether you have factors that may increase your risk for this disease. You may want to have this test before age 72. · Heart attack and stroke risk. At least every 4 to 6 years, you should have your risk for heart attack and stroke assessed. Your doctor uses factors such as your age, blood pressure, cholesterol, and whether you smoke or have diabetes to show what your risk for a heart attack or stroke is over the next 10 years. When should you call for help? Watch closely for changes in your health, and be sure to contact your doctor if you have any problems or symptoms that concern you. Where can you learn more? Go to http://shanna-zoe.info/. Enter Z977 in the search box to learn more about \"Well Visit, Women 50 to 72: Care Instructions. \" Current as of: July 19, 2016 Content Version: 11.2 © 6385-5392 [employer]. Care instructions adapted under license by [employer] (which disclaims liability or warranty for this information). If you have questions about a medical condition or this instruction, always ask your healthcare professional. [address] any warranty or liability for your use of this information. Introducing [facility]! Dear Bhumi Cruz: Thank you for requesting a [employer] account. Our records indicate that you already have an active [employer] account. You can access your account anytime at https://[employer]. [employer] Did you know that you can access your hospital and ER discharge instructions at any time in [employer]? You can also review all of your test results from your hospital stay or ER visit. Additional Information If you have questions, please visit the Frequently Asked Questions section of the [employer] website at https://[employer]. [employer]/. Remember, [employer] is NOT to be used for urgent needs. For medical emergencies, dial 911. Now available from your iPhone and Android! Please provide this summary of care documentation to your next provider. Your primary care clinician is listed as NONE. If you have any questions after today's visit, please call [phone].

## 2021-11-08 NOTE — TELEPHONE ENCOUNTER
Lyndsey Beth called requesting a refill of the below medication which has been pended for you:     Requested Prescriptions     Pending Prescriptions Disp Refills    azelastine (ASTELIN) 0.1 % nasal spray 60 mL 5     Si spray by Nasal route 2 times daily Use in each nostril as directed       Last Appointment Date: 21  Next Appointment Date: 12/15/21    No Known Allergies

## 2021-11-08 NOTE — TELEPHONE ENCOUNTER
S/w pt, stat he needs a refill on Azelastine sen to nasal spray 0.1% sent [facility]. States the doctor that usually prescribes this for him passed away.

## 2021-12-08 DIAGNOSIS — Z00.00 WELL ADULT EXAM: ICD-10-CM

## 2021-12-08 LAB — HEPATITIS C ANTIBODY INTERPRETATION: NORMAL

## 2021-12-15 ENCOUNTER — OFFICE VISIT (OUTPATIENT)
Dept: INTERNAL MEDICINE | Age: 76
End: 2021-12-15
Payer: MEDICARE

## 2021-12-15 VITALS
OXYGEN SATURATION: 99 % | HEIGHT: 72 IN | DIASTOLIC BLOOD PRESSURE: 68 MMHG | WEIGHT: 208 LBS | BODY MASS INDEX: 28.17 KG/M2 | SYSTOLIC BLOOD PRESSURE: 130 MMHG | HEART RATE: 68 BPM

## 2021-12-15 DIAGNOSIS — E78.00 PURE HYPERCHOLESTEROLEMIA: ICD-10-CM

## 2021-12-15 DIAGNOSIS — R35.0 BENIGN PROSTATIC HYPERPLASIA WITH URINARY FREQUENCY: ICD-10-CM

## 2021-12-15 DIAGNOSIS — I10 HTN (HYPERTENSION), BENIGN: ICD-10-CM

## 2021-12-15 DIAGNOSIS — G89.4 CHRONIC PAIN SYNDROME: ICD-10-CM

## 2021-12-15 DIAGNOSIS — N40.1 BENIGN PROSTATIC HYPERPLASIA WITH URINARY FREQUENCY: ICD-10-CM

## 2021-12-15 DIAGNOSIS — Z00.00 ROUTINE GENERAL MEDICAL EXAMINATION AT A HEALTH CARE FACILITY: ICD-10-CM

## 2021-12-15 DIAGNOSIS — Z12.5 ENCOUNTER FOR PROSTATE CANCER SCREENING: ICD-10-CM

## 2021-12-15 DIAGNOSIS — I25.10 CHRONIC CORONARY ARTERY DISEASE: Primary | ICD-10-CM

## 2021-12-15 DIAGNOSIS — E55.9 VITAMIN D DEFICIENCY: ICD-10-CM

## 2021-12-15 PROCEDURE — G8484 FLU IMMUNIZE NO ADMIN: HCPCS | Performed by: NURSE PRACTITIONER

## 2021-12-15 PROCEDURE — G8427 DOCREV CUR MEDS BY ELIG CLIN: HCPCS | Performed by: NURSE PRACTITIONER

## 2021-12-15 PROCEDURE — 4040F PNEUMOC VAC/ADMIN/RCVD: CPT | Performed by: NURSE PRACTITIONER

## 2021-12-15 PROCEDURE — 99214 OFFICE O/P EST MOD 30 MIN: CPT | Performed by: NURSE PRACTITIONER

## 2021-12-15 PROCEDURE — G0439 PPPS, SUBSEQ VISIT: HCPCS | Performed by: NURSE PRACTITIONER

## 2021-12-15 PROCEDURE — G8417 CALC BMI ABV UP PARAM F/U: HCPCS | Performed by: NURSE PRACTITIONER

## 2021-12-15 PROCEDURE — 1123F ACP DISCUSS/DSCN MKR DOCD: CPT | Performed by: NURSE PRACTITIONER

## 2021-12-15 PROCEDURE — 4004F PT TOBACCO SCREEN RCVD TLK: CPT | Performed by: NURSE PRACTITIONER

## 2021-12-15 RX ORDER — CLOPIDOGREL BISULFATE 75 MG/1
TABLET ORAL
Qty: 90 TABLET | Refills: 3 | Status: SHIPPED | OUTPATIENT
Start: 2021-12-15 | End: 2022-10-03

## 2021-12-15 RX ORDER — LISINOPRIL 2.5 MG/1
2.5 TABLET ORAL DAILY
Qty: 90 TABLET | Refills: 3 | Status: SHIPPED | OUTPATIENT
Start: 2021-12-15

## 2021-12-15 RX ORDER — LOVASTATIN 20 MG/1
20 TABLET ORAL NIGHTLY
Qty: 90 TABLET | Refills: 3 | Status: SHIPPED | OUTPATIENT
Start: 2021-12-15 | End: 2022-02-14

## 2021-12-15 ASSESSMENT — ENCOUNTER SYMPTOMS
VOMITING: 0
ABDOMINAL DISTENTION: 0
TROUBLE SWALLOWING: 0
WHEEZING: 0
CHOKING: 0
BLOOD IN STOOL: 0
CONSTIPATION: 0
COUGH: 0
ABDOMINAL PAIN: 0
EYE DISCHARGE: 0
DIARRHEA: 0
NAUSEA: 0
SORE THROAT: 0
EYE ITCHING: 0
COLOR CHANGE: 0
SHORTNESS OF BREATH: 0
STRIDOR: 0

## 2021-12-15 ASSESSMENT — PATIENT HEALTH QUESTIONNAIRE - PHQ9
SUM OF ALL RESPONSES TO PHQ9 QUESTIONS 1 & 2: 0
SUM OF ALL RESPONSES TO PHQ QUESTIONS 1-9: 0
1. LITTLE INTEREST OR PLEASURE IN DOING THINGS: 0
2. FEELING DOWN, DEPRESSED OR HOPELESS: 0
SUM OF ALL RESPONSES TO PHQ QUESTIONS 1-9: 0
SUM OF ALL RESPONSES TO PHQ QUESTIONS 1-9: 0

## 2021-12-15 NOTE — PATIENT INSTRUCTIONS
1.  BPH; We will add proscar and refer to urology   2. Cad with Stent; Stable no changes  3. Hypertension   Stable no changes  4. Hyperlipidemia; The current medical regimen is effective;  continue present plan and medications. #5 chronic pain syndrome stable and followed by pain management  Personalized Preventive Plan for [place] - 12/15/2021  Medicare offers a range of preventive health benefits. Some of the tests and screenings are paid in full while other may be subject to a deductible, co-insurance, and/or copay. Some of these benefits include a comprehensive review of your medical history including lifestyle, illnesses that may run in your family, and various assessments and screenings as appropriate. After reviewing your medical record and screening and assessments performed today your provider may have ordered immunizations, labs, imaging, and/or referrals for you. A list of these orders (if applicable) as well as your Preventive Care list are included within your After Visit Summary for your review. Other Preventive Recommendations:    · A preventive eye exam performed by an eye specialist is recommended every 1-2 years to screen for glaucoma; cataracts, macular degeneration, and other eye disorders. · A preventive dental visit is recommended every 6 months. · Try to get at least 150 minutes of exercise per week or 10,000 steps per day on a pedometer . · Order or download the FREE \"Exercise & Physical Activity: Your Everyday Guide\" from [employer]. Call 7-561.289.1738 or search [employer] online. · You need 7462-2382 mg of calcium and 0986-5844 IU of vitamin D per day. It is possible to meet your calcium requirement with diet alone, but a vitamin D supplement is usually necessary to meet this goal.  · When exposed to the sun, use a sunscreen that protects against both UVA and UVB radiation with an SPF of 30 or greater.  Reapply every 2 to 3 hours or after sweating, drying off with a towel, or swimming. · Always wear a seat belt when traveling in a car. Always wear a helmet when riding a bicycle or motorcycle.

## 2021-12-15 NOTE — PROGRESS NOTES
[address] [facility]  [address] [address] [place] [address]  Dept: [phone]  Dept Fax: [phone]  Loc: [phone]    Giselel Whitlock (:  1945) is a 67 y.o. male,Established patient green, here for evaluation of the following chief complaint(s): Medicare Lo Li is a 67 y.o. male who presents today for his medical conditions/complaints as noted below. Giselle Whitlock is c/robinson Medicare AWV        HPI:     Chief Complaint   Patient presents with    Medicare AWV     HPI   1.   BPH; He is on flomax  ; He feels he cant empty his bladder; He is going   #2 coronary artery disease stable on current meds no recent chest pain palpitations he is on Plavix 75 mg daily  3. Hypertension blood pressure is good today he is on lisinopril 2.5 daily  4 hyperlipidemia he is on Mevacor 20 at night no side effects of the meds he takes as directed labs are good today  5.   Chronic pain syndrome he goes to pain management with oxycodone and gabapentin  Past Medical History:   Diagnosis Date    Adenomatous colon polyp     CAD (coronary artery disease)     LAD stent 3/2005 [place]    COPD [facility]chronic obstructive [facility] disease) [facility] [address] [facility]     Hypertension     Male erectile dysfunction     Pure hypercholesterolemia     Sick sinus syndrome ([facility] [address] [facility]     Vertigo       Past Surgical History:   Procedure Laterality Date    MASTOIDECTOMY      left ear 3/15 tympanomastoidectomy, Dr Cornelia Vega, [facility]       Vitals 12/15/2021 2021 2021 2020 2020    SYSTOLIC 227 [phone]   DIASTOLIC 68 62 72 80 85 78   Pulse 68 70 72 79 100 74   Temp - - - - - -   Resp - - - - - 18   SpO2 99 97 97 - 97 98   Weight 208 lb 204 lb 208 lb 202 lb 201 lb -   Height 6' 0\" 6' 0\" 6' 0\" 6' 0\" 6' 0\" -   Body mass index 28.21 kg/m2 27.66 kg/m2 28.21 kg/m2 27.39 kg/m2 27.26 kg/m2 -   Some recent data might be hidden       Family History   Problem Relation Age of Onset    Breast Cancer Mother     Emphysema Father        Social History     Tobacco Use    Smoking status: Current Every Day Smoker     Packs/day: 1.00     Years: 30.00     Pack years: 30.00     Types: Cigarettes    Smokeless tobacco: Never Used   Substance Use Topics    Alcohol use: No      Current Outpatient Medications   Medication Sig Dispense Refill    lisinopril (PRINIVIL;ZESTRIL) 2.5 MG tablet Take 1 tablet by mouth daily 90 tablet 3    ADVAIR DISKUS 250-50 MCG/DOSE AEPB INHALE 1 DOSE BY MOUTH TWICE DAILY 180 each 5    lovastatin (MEVACOR) 20 MG tablet Take 1 tablet by mouth nightly 90 tablet 3    clopidogrel (PLAVIX) 75 MG tablet Take 1 tablet by mouth once daily 90 tablet 3    azelastine (ASTELIN) 0.1 % nasal spray 1 spray by Nasal route 2 times daily Use in each nostril as directed 60 mL 5    fluticasone (FLONASE) 50 MCG/ACT nasal spray 2 sprays by Nasal route daily 16 g 5    vitamin D (ERGOCALCIFEROL) 1.25 MG (34218 UT) CAPS capsule Take 1 capsule by mouth once a week 12 capsule 1    tamsulosin (FLOMAX) 0.4 MG capsule Take 1 capsule by mouth daily 90 capsule 1    nitroGLYCERIN (NITROSTAT) 0.4 MG SL tablet Place 1 tablet under the tongue every 5 minutes as needed for Chest pain up to max of 3 total doses. If no relief after 1 dose, call 911. 25 tablet 1    gabapentin (NEURONTIN) 300 MG capsule TAKE 1 CAPSULE BY MOUTH EVERY 8 HOURS AS NEEDED  5    tiZANidine (ZANAFLEX) 4 MG tablet Take 1 tablet by mouth 3 times daily 30 tablet 5    Cyanocobalamin (VITAMIN B 12 PO) Take 1 tablet by mouth daily      aspirin 81 MG EC tablet Take 81 mg by mouth daily      oxyCODONE-acetaminophen (PERCOCET) 7.5-325 MG per tablet Take 1 tablet by mouth 2 times daily as needed.  ibuprofen (ADVIL;MOTRIN) 800 MG tablet Take 1 tablet by mouth 2 times daily as needed for Pain 120 tablet 1     No current facility-administered medications for this visit.      No Known Allergies    Health Maintenance Topic Date Due    Flu vaccine (1) 09/01/2021    Annual Wellness Visit (AWV)  12/10/2021    DTaP/Tdap/Td vaccine (1 - Tdap) 01/05/2022 (Originally 4/3/1964)    COVID-19 Vaccine (1) 07/11/2022 (Originally 4/3/1957)    Shingles Vaccine (1 of 2) 12/15/2022 (Originally 4/3/1994)    Lipid screen  06/25/2022    Potassium monitoring  06/25/2022    Creatinine monitoring  06/25/2022    Low dose CT lung screening  07/22/2022    Pneumococcal 65+ years Vaccine  Completed    Hepatitis C screen  Completed    Hepatitis A vaccine  Aged Out    Hepatitis B vaccine  Aged Out    Hib vaccine  Aged Out    Meningococcal (ACWY) vaccine  Aged Out       No results found for: LABA1C  Lab Results   Component Value Date    PSA 1.60 12/30/2020    PSA 1.71 12/30/2019    PSA 1.49 12/26/2018     TSH   Date Value Ref Range Status   06/25/2021 2.090 0.270 - 4.200 uIU/mL Final   ]  Lab Results   Component Value Date     06/25/2021    K 4.4 06/25/2021     06/25/2021    CO2 26 06/25/2021    BUN 20 06/25/2021    CREATININE 0.9 06/25/2021    GLUCOSE 96 06/25/2021    CALCIUM 9.5 06/25/2021    PROT 6.9 06/25/2021    LABALBU 4.4 06/25/2021    BILITOT 1.2 06/25/2021    ALKPHOS 67 06/25/2021    AST 21 06/25/2021    ALT 15 06/25/2021    LABGLOM >60 06/25/2021    GFRAA >59 06/25/2021     Lab Results   Component Value Date    CHOL 105 (L) 06/25/2021    CHOL 109 (L) 12/30/2020    CHOL 122 (L) 12/30/2019     Lab Results   Component Value Date    TRIG 89 06/25/2021    TRIG 99 12/30/2020    TRIG 92 12/30/2019     Lab Results   Component Value Date    HDL 37 (L) 06/25/2021    HDL 39 (L) 12/30/2020    HDL 42 (L) 12/30/2019     Lab Results   Component Value Date    LDLCALC 50 06/25/2021    LDLCALC 50 12/30/2020    LDLCALC 62 12/30/2019     Lab Results   Component Value Date     06/25/2021    K 4.4 06/25/2021     06/25/2021    CO2 26 06/25/2021    BUN 20 06/25/2021    CREATININE 0.9 06/25/2021    GLUCOSE 96 06/25/2021    CALCIUM 9.5 06/25/2021      Lab Results   Component Value Date    WBC 5.3 06/25/2021    HGB 14.6 06/25/2021    HCT 43.3 06/25/2021    MCV 92.1 06/25/2021     06/25/2021    LYMPHOPCT 33.9 06/25/2021    RBC 4.70 06/25/2021    MCH 31.1 (H) 06/25/2021    MCHC 33.7 06/25/2021    RDW 12.4 06/25/2021     Lab Results   Component Value Date    VITD25 49.2 06/25/2021     Labs reviewed from 12/10/2021    Subjective:      Review of Systems   Constitutional: Negative for fatigue, fever and unexpected weight change. HENT: Negative for ear discharge, ear pain, mouth sores, sore throat and trouble swallowing. Eyes: Negative for discharge, itching and visual disturbance. Respiratory: Negative for cough, choking, shortness of breath, wheezing and stridor. Cardiovascular: Negative for chest pain, palpitations and leg swelling. Gastrointestinal: Negative for abdominal distention, abdominal pain, blood in stool, constipation, diarrhea, nausea and vomiting. Endocrine: Negative for cold intolerance, polydipsia and polyuria. Genitourinary: Positive for frequency and urgency. Negative for difficulty urinating and dysuria. Musculoskeletal: Negative for arthralgias and gait problem. Skin: Negative for color change and rash. Allergic/Immunologic: Negative for food allergies and immunocompromised state. Neurological: Negative for dizziness, tremors, syncope, speech difficulty, weakness and headaches. Hematological: Negative for adenopathy. Does not bruise/bleed easily. Psychiatric/Behavioral: Negative for confusion and hallucinations. Objective:     Physical Exam  Constitutional:       General: He is not in acute distress. Appearance: He is well-developed. HENT:      Head: Normocephalic and atraumatic. Eyes:      General: No scleral icterus. Right eye: No discharge. Left eye: No discharge. Pupils: Pupils are equal, round, and reactive to light. Neck:      Thyroid: No thyromegaly. Vascular: No JVD. Cardiovascular:      Rate and Rhythm: Normal rate and regular rhythm. Heart sounds: Normal heart sounds. No murmur heard. Pulmonary:      Effort: Pulmonary effort is normal. No respiratory distress. Breath sounds: Normal breath sounds. No wheezing or rales. Abdominal:      General: Bowel sounds are normal. There is no distension. Palpations: Abdomen is soft. There is no mass. Tenderness: There is no abdominal tenderness. There is no guarding or rebound. Musculoskeletal:         General: No tenderness. Normal range of motion. Cervical back: Normal range of motion and neck supple. Skin:     General: Skin is warm and dry. Findings: No erythema or rash. Neurological:      Mental Status: He is alert and oriented to person, place, and time. Cranial Nerves: No cranial nerve deficit. Coordination: Coordination normal.      Deep Tendon Reflexes: Reflexes are normal and symmetric. Reflexes normal.   Psychiatric:         Mood and Affect: Mood is not depressed. Behavior: Behavior normal.         Thought Content:  Thought content normal.         Judgment: Judgment normal.       /68   Pulse 68   Ht 6' (1.829 m)   Wt 208 lb (94.3 kg)   SpO2 99%   BMI 28.21 kg/m²           Assessment:      Problem List     Benign prostatic hyperplasia with urinary frequency     He is on Flomax with little improvement we will add Proscar and refer to urology          Relevant Orders    [facility], [place]    Chronic coronary artery disease - Primary     Stable on Plavix          Relevant Medications    aspirin 81 MG EC tablet    nitroGLYCERIN (NITROSTAT) 0.4 MG SL tablet    lisinopril (PRINIVIL;ZESTRIL) 2.5 MG tablet    lovastatin (MEVACOR) 20 MG tablet    Chronic pain syndrome     He is followed by pain management his good results          Relevant Medications    ibuprofen (ADVIL;MOTRIN) 800 MG tablet    aspirin 81 MG EC tablet    oxyCODONE-acetaminophen (PERCOCET) 7.5-325 MG per tablet    tiZANidine (ZANAFLEX) 4 MG tablet    gabapentin (NEURONTIN) 300 MG capsule    HTN (hypertension), benign     He is stable on 2.5 of lisinopril daily          Relevant Orders    CBC Auto Differential    Comprehensive Metabolic Panel    Urinalysis Reflex to Culture    TSH without Reflex    Pure hypercholesterolemia     Lipids are good on 20 of Mevacor          Relevant Medications    aspirin 81 MG EC tablet    nitroGLYCERIN (NITROSTAT) 0.4 MG SL tablet    lisinopril (PRINIVIL;ZESTRIL) 2.5 MG tablet    lovastatin (MEVACOR) 20 MG tablet    Other Relevant Orders    Lipid Panel    Vitamin D deficiency    Relevant Orders    Vitamin D 25 Hydroxy          Plan:        Patient given educational materials - see patient instructions. Discussed use, benefit, and side effects of prescribed medications. Allpatient questions answered. Pt voiced understanding. Reviewed health maintenance. Instructed to continue current medications, diet and exercise. Patient agreed with treatment plan. Follow up as directed.    MEDICATIONS:  Orders Placed This Encounter   Medications    lisinopril (PRINIVIL;ZESTRIL) 2.5 MG tablet     Sig: Take 1 tablet by mouth daily     Dispense:  90 tablet     Refill:  3    ADVAIR DISKUS 250-50 MCG/DOSE AEPB     Sig: INHALE 1 DOSE BY MOUTH TWICE DAILY     Dispense:  180 each     Refill:  5    lovastatin (MEVACOR) 20 MG tablet     Sig: Take 1 tablet by mouth nightly     Dispense:  90 tablet     Refill:  3    clopidogrel (PLAVIX) 75 MG tablet     Sig: Take 1 tablet by mouth once daily     Dispense:  90 tablet     Refill:  3         ORDERS:  Orders Placed This Encounter   Procedures    CBC Auto Differential    Comprehensive Metabolic Panel    Lipid Panel    Vitamin D 25 Hydroxy    Urinalysis Reflex to Culture    TSH without Reflex    PSA screening   [facility], [place]       Follow-up:  Return in 6 months (on 6/15/2022) for Medicare Annual Wellness Visit in  year, have labs done prior to appt. PATIENT INSTRUCTIONS:  Patient Instructions    1. BPH; We will add proscar and refer to urology   2. Cad with Stent; Stable no changes  3. Hypertension   Stable no changes  4. Hyperlipidemia; The current medical regimen is effective;  continue present plan and medications. #5 chronic pain syndrome stable and followed by pain management  Personalized Preventive Plan for Dariela Keene - 12/15/2021  Medicare offers a range of preventive health benefits. Some of the tests and screenings are paid in full while other may be subject to a deductible, co-insurance, and/or copay. Some of these benefits include a comprehensive review of your medical history including lifestyle, illnesses that may run in your family, and various assessments and screenings as appropriate. After reviewing your medical record and screening and assessments performed today your provider may have ordered immunizations, labs, imaging, and/or referrals for you. A list of these orders (if applicable) as well as your Preventive Care list are included within your After Visit Summary for your review. Other Preventive Recommendations:    · A preventive eye exam performed by an eye specialist is recommended every 1-2 years to screen for glaucoma; cataracts, macular degeneration, and other eye disorders. · A preventive dental visit is recommended every 6 months. · Try to get at least 150 minutes of exercise per week or 10,000 steps per day on a pedometer . · Order or download the FREE \"Exercise & Physical Activity: Your Everyday Guide\" from [employer]. Call 4-818.671.7315 or search [employer] online. · You need 9552-3302 mg of calcium and 6385-4392 IU of vitamin D per day.  It is possible to meet your calcium requirement with diet alone, but a vitamin D supplement is usually necessary to meet this goal.  · When exposed to the sun, use a sunscreen that protects against both UVA and UVB radiation with an SPF of 30 or greater. Reapply every 2 to 3 hours or after sweating, drying off with a towel, or swimming. · Always wear a seat belt when traveling in a car. Always wear a helmet when riding a bicycle or motorcycle. Electronically signed by ALLYN Ibrahim on 12/15/2021 at 11:40 AM    @On this date 12/15/2021 I have spent 22  minutes reviewing previous notes, test results and face to face with the patient discussing the diagnosis and importance of compliance with the treatment plan as well as documenting on the day of the visit. [employer]MR[employer]agon/transcription disclaimer:  Much of this encounter note is electronic transcription/translation of spoken language to printed texts. The electronic translation of spoken language may be erroneous, or at times,nonsensical words or phrases may be inadvertently transcribed.   Although I have reviewed the note for such errors, some may still exist.

## 2022-01-31 ENCOUNTER — TELEPHONE (OUTPATIENT)
Dept: INTERNAL MEDICINE | Age: 77
End: 2022-01-31

## 2022-01-31 NOTE — TELEPHONE ENCOUNTER
Halle Fregoso called requesting a refill of the below medication which has been pended for you:     Requested Prescriptions     Pending Prescriptions Disp Refills    ADVAIR DISKUS 250-50 MCG/DOSE AEPB 180 each 5     Sig: INHALE 1 DOSE BY MOUTH TWICE DAILY       Last Appointment Date: 12/15/2021  Next Appointment Date: 6/15/2022    No Known Allergies

## 2022-02-12 DIAGNOSIS — E78.00 PURE HYPERCHOLESTEROLEMIA: Primary | ICD-10-CM

## 2022-02-14 RX ORDER — LOVASTATIN 20 MG/1
TABLET ORAL
Qty: 90 TABLET | Refills: 3 | Status: SHIPPED | OUTPATIENT
Start: 2022-02-14

## 2022-02-14 NOTE — TELEPHONE ENCOUNTER
Damaris Grimm called to request a refill on his medication.       Last office visit : 12/15/2021   Next office visit : 6/15/2022     Requested Prescriptions     Pending Prescriptions Disp Refills    lovastatin (MEVACOR) 20 MG tablet [Pharmacy Med Name: LOVASTATIN 20MG TABLETS] 90 tablet 3     Sig: TAKE ONE TABLET BY MOUTH NIGHTLY            Basil Holman MA

## 2022-04-20 ENCOUNTER — OFFICE VISIT (OUTPATIENT)
Dept: OTOLARYNGOLOGY | Facility: CLINIC | Age: 77
End: 2022-04-20

## 2022-04-20 VITALS
SYSTOLIC BLOOD PRESSURE: 160 MMHG | TEMPERATURE: 98.2 F | WEIGHT: 208.8 LBS | HEIGHT: 72 IN | DIASTOLIC BLOOD PRESSURE: 81 MMHG | HEART RATE: 73 BPM | BODY MASS INDEX: 28.28 KG/M2

## 2022-04-20 DIAGNOSIS — H92.11 OTORRHEA OF RIGHT EAR: ICD-10-CM

## 2022-04-20 DIAGNOSIS — H74.92 MASTOID DISORDER, LEFT: ICD-10-CM

## 2022-04-20 DIAGNOSIS — Z97.4 PRESENCE OF EXTERNAL HEARING AID: ICD-10-CM

## 2022-04-20 DIAGNOSIS — L92.9 GRANULATION TISSUE ABNORMALITY: ICD-10-CM

## 2022-04-20 DIAGNOSIS — H70.12 CHRONIC MASTOIDITIS OF LEFT SIDE: ICD-10-CM

## 2022-04-20 DIAGNOSIS — H72.93 PERFORATION OF BOTH TYMPANIC MEMBRANES: Primary | ICD-10-CM

## 2022-04-20 PROCEDURE — 99213 OFFICE O/P EST LOW 20 MIN: CPT | Performed by: OTOLARYNGOLOGY

## 2022-04-20 PROCEDURE — 69220 CLEAN OUT MASTOID CAVITY: CPT | Performed by: OTOLARYNGOLOGY

## 2022-04-20 RX ORDER — LOVASTATIN 20 MG/1
1 TABLET ORAL
COMMUNITY
Start: 2022-02-14

## 2022-04-20 NOTE — PROGRESS NOTES
ENT PROCEDURE NOTE:  Pre-operative Diagnosis:   1. Perforation of both tympanic membranes    2. Chronic mastoiditis of left side    3. Presence of external hearing aid    4. Mastoid disorder, left    5. Granulation tissue abnormality    6. Otorrhea of right ear        Post-operative Diagnosis: same    Anesthesia: none    Procedure:  Binocular ear microscopy with cerumen removal    Side: Left  Right side examined under the microscope    Procedure Details:    The patient was placed supine on the procedure table. Using a speculum, the ear(s) was/were examined with the microscope.   Using Micro-instrumentation and suction, the mastoid was cleared.    Findings:  Ear canal: Left side-surgically altered with canal wall down mastoidectomy, debris present over the lateral semicircular canal and over the facial nerve into the inferior part of the external auditory canal  Right side-normal skin, normal shape, no drainage  Tympanic membrane: Left-surgical changes with posterior 10 to 15% perforation that is very dry  Right-intact, no perforation seen, central area of granulation just inferior to the malleus, very flat and pale, no bubbling with Valsalva  Middle Ear: Left-dry, no evidence of cholesteatoma, small middle ear space on the left because of canal wall down mastoidectomy  Right-not clearly visualized because of thickening and granulation  Mastoid: Left-dry debris over the lateral semicircular canal, intact skin, no granulation  Right-not visualized because of no prior surgery    Right tympanic membrane-no medications placed on the right tympanic membrane.  It appears to be improving.  I have recommended the patient continue Elocon lotion to the right ear canal as prescribed by Dr. Acevedo.    Condition:  Stable.  Patient tolerated procedure well.    Complications:  None  Post-procedure instructions reviewed with Patient  Ear care initiated, instructions on AVS

## 2022-04-20 NOTE — PATIENT INSTRUCTIONS
WATER PRECAUTIONS FOR EARS    Protecting your ears from water may sometimes be necessary for the health of your ears.     > Ear plugs: You may use earplugs: over the counter silicone plugs or a cotton ball coated with vasoline when bathing. If conservative measures are not working, consider obtaining molded earplugs from the audiology department to use while bathing or swimming.   Purchase inexpensive types that are most comfortable for you. You can make your own by using cotton balls mixed with a generous amount of Vaseline petroleum jelly. Gently place these in the ear canal.    > Dry the ear canal: after getting out of the shower or bath, use a hair dryer on low heat blowing in the ear for 10-15 seconds. Pulling gently backwards on the ear straightens the ear canal and allows the air to get further down.    > If you are to use ear drops, please place them in the ear canal and give them a few seconds to run down.  Follow this by blowing in the ear canal with a hair dryer set on low heat for approximately 10 to 15 seconds.  You may do this multiple times during the day to help keep the ear canal dry.    >If you are swimming frequently- place a drop of oil in each ear canal prior to entering water. After you are finished in the water, place a drop of vinegar in each ear canal. Use a hair dryer on low heat to blow in each ear canal for 10-15 seconds to dry ears out.    Use Dr Acevedo ear drops once a week in R ear    See Dr. Acevedo for routine follow-up and cleaning of ears.  Call for any ear drainage  Wear hearing aid in the right ear when ear is dry          CONTACT INFORMATION:  The main office phone number is [phone]. For emergencies after hours and on weekends, this number will convert over to our answering service and the on call provider will answer. Please try to keep non emergent phone calls/ questions to office hours 9am-5pm Monday through Friday.     eflow  As an alternative, you can sign up and use  the Epic MyChart system for more direct and quicker access for non emergent questions/ problems.  [employer] allows you to send messages to your doctor, view your test results, renew your prescriptions, schedule appointments, and more. To sign up, go to [employer] and click on the Sign Up Now link in the New User? box. Enter your [employer] Activation Code exactly as it appears below along with the last four digits of your Social Security Number and your Date of Birth () to complete the sign-up process. If you do not sign up before the expiration date, you must request a new code.    [employer] Activation Code: Activation code not generated  Current [employer] Status: Active    If you have questions, you can email [email or web address] or call [phone] to talk to our [employer] staff. Remember, [employer] is NOT to be used for urgent needs. For medical emergencies, dial 911.    IF YOU SMOKE OR USE TOBACCO PLEASE READ THE FOLLOWING:  Why is smoking bad for me?  Smoking increases the risk of heart disease, lung disease, vascular disease, stroke, and cancer. If you smoke, STOP!      IF YOU SMOKE OR USE TOBACCO PLEASE READ THE FOLLOWING:  Why is smoking bad for me?  Smoking increases the risk of heart disease, lung disease, vascular disease, stroke, and cancer. If you smoke, STOP!    For more information:  Quit Now [facility] Hospital[facility]  -QUIT-NOW  [email or web address]

## 2022-04-20 NOTE — PROGRESS NOTES
Adolfo Bah Jr, MD  [facility] [place] [facility] EAR NOSE & THROAT  [address], [address]  [facility] [address]  Fax [phone]  Phone [phone]      Visit Type: FOLLOW UP   Chief Complaint   Patient presents with   • Follow-up     Chronic otorrhea, mastoiditis        HPI   Accompanied by: No one  He presents for a follow up evaluation. He has seen Dr Acevedo at [facility]. He was prescribed Elocon lotion.  He has drainage R side. No hearing l side. He says Dr Acevedo did not use powder. He sad Dr Acevedo used something else. Ear has cleared and had no more drainage in a while.     Past Medical History:   Diagnosis Date   • Cholesteatoma    • Chronic mastoiditis    • Chronic otitis externa    • Chronic otitis media    • Elevated cholesterol    • Eustachian tube dysfunction    • Hearing loss    • Heart disease    • Hx of colonic polyp    • Hx of myringotomy    • Hypertension    • Impacted cerumen    • Mastoiditis    • MI, old    • Polyp of right middle ear     granulation polyp of righttympanic membrane   • Tympanic membrane perforation        Past Surgical History:   Procedure Laterality Date   • COLONOSCOPY N/A 1/22/2019    Procedure: COLONOSCOPY WITH ANESTHESIA;  Surgeon: Dilip Vasquez MD;  Location: [facility] ENDOSCOPY;  Service: Gastroenterology   • COLONOSCOPY W/ POLYPECTOMY  12/30/2013    Tubular adenomatous polyp at 20 cm repeat exam in 5 years   • CORONARY ANGIOPLASTY WITH STENT PLACEMENT     • EAR TUBES Right    • SHOULDER SURGERY     • TYMPANOMASTOIDECTOMY  02/2015    left  [place]urtis       Family History: His family history includes Breast cancer in his mother.     Social History: He  reports that he has been smoking cigarettes. He started smoking about 65 years ago. He has been smoking about 1.00 pack per day. He has never used smokeless tobacco. He reports that he does not drink alcohol and does not use drugs.    Home Medications:  Azelastine HCl, aspirin,  azithromycin, clopidogrel, donepezil, fluticasone, fluticasone-salmeterol, gabapentin, ibuprofen, ipratropium, lisinopril, lovastatin, methylPREDNISolone, nitroglycerin, oxyCODONE-acetaminophen, prednisoLONE acetate, sildenafil, tiZANidine, and vitamin D    Allergies:  He has No Known Allergies.       Vital Signs:      ENT Physical Exam  Constitutional  Appearance: patient appears well-developed and well-nourished,  Communication/Voice: communication appropriate for developmental age; vocal quality normal;  Head and Face  Appearance: head appears normal, face appears normal and face appears atraumatic;  Palpation: facial palpation normal;  Salivary: glands normal;  Ear  Hearing: intact;  Auricles: right auricle normal; left auricle normal;  External Mastoids: right external mastoid normal; left external mastoid normal;  Nose  External Nose: nares patent bilaterally; external nose normal;  Internal Nose: Nasal mucosa comments: very dry   Septum comments: dry   bilateral inferior turbinates normal;  Oral Cavity/Oropharynx  Lips: normal;  Teeth: dentures (upper and lower) noted;  Gums: gingiva normal;  Tongue: normal;  Oral mucosa: normal;  Hard palate: normal;  Soft palate: normal;  Tonsils: normal;  Base of Tongue: normal;  Posterior pharyngeal wall: normal;  Neck  Neck: neck normal;  Respiratory  Inspection: breathing unlabored; normal breathing rate;  Cardiovascular  Inspection: extremities are warm and well perfused; no peripheral edema present;  Neurovestibular  Mental Status: alert and oriented;  Psychiatric: mood normal; affect is appropriate;  Drawings           Result Review    RESULTS REVIEW    I have reviewed the patients old records in the chart.   I have reviewed the patients old records in the chart.  The following results/records were reviewed:   Office Visit with Adolfo Bah Jr., MD (01/27/2021)   EXTERNAL MEDICAL RECORDS - SCAN - [place] RECORDS (03/09/2022)  EXTERNAL MEDICAL RECORDS -  SCAN - [facility] RECORDS (03/09/2022)  EXTERNAL MEDICAL RECORDS - SCAN - [place] Records (05/06/2021)      Assessment/Plan    Diagnoses and all orders for this visit:    1. Perforation of both tympanic membranes (Primary)  Comments:  closed today with granulation-right side  Left side with persistent, dry perforation, no change    2. Chronic mastoiditis of left side    3. Presence of external hearing aid  Comments:  Right side    4. Mastoid disorder, left  Comments:  Cleaned today    5. Granulation tissue abnormality  Comments:  Right tympanic membrane, mild, closing perforation    6. Otorrhea of right ear  Comments:  Markedly improved today, no otorrhea seen in the external auditory canal       Continue current management plan.  Conservative management.     Patient essentially is an only hearing ear on the right side.  He has mild granulation on this side causing moisture and debris.  He is currently seeing me and Dr. Acevedo.  I recommend he use Dr. Acevedo's Elocon lotion at least once a week at this point in time.  I recommended he follow-up with Dr. Acevedo, since Dr. Acevedo did his left-sided ear surgery.  He needs left-sided mastoid recurrent debridement periodically.  I have debrided him today.  I feel he needs continued therapy in the right side.  I have recommended powdering his right side.  He is concerned that Dr. Acevedo did not powder but instead use drops.  By powder contains steroids, Dr. Acevedo drops contain steroids.  I feel he needs steroids in the right ear.  He needs recurrent follow-up on the left side.  Since he is seeing Dr. Acevedo routinely anyway, I will defer to Dr. Acevedo.  I will see the patient as needed.  He currently has no perforation on the right side but instead mild granulation.  He has a small perforation on the left side.  I have cleaned his left side today.  I have examined the right side under the microscope today as well.  Water precautions  Steroid lotion weekly  Wear hearing aid  right side  Call for drainage  See Dr. Acevedo for routine left mastoid debridement and right granulation      My Chart:  Patient is using My Chart    Patient understand(s) and agree(s) with the treatment plan as described.    Return if symptoms worsen or fail to improve, for Recheck R ear and L mastoid.      Adolfo Bah Jr, MD  04/20/22  11:46 CDT

## 2022-06-08 DIAGNOSIS — Z12.5 ENCOUNTER FOR PROSTATE CANCER SCREENING: ICD-10-CM

## 2022-06-08 DIAGNOSIS — E78.00 PURE HYPERCHOLESTEROLEMIA: ICD-10-CM

## 2022-06-08 DIAGNOSIS — E55.9 VITAMIN D DEFICIENCY: ICD-10-CM

## 2022-06-08 DIAGNOSIS — I10 HTN (HYPERTENSION), BENIGN: ICD-10-CM

## 2022-06-08 LAB
ALBUMIN SERPL-MCNC: 4.3 G/DL (ref 3.5–5.2)
ALP BLD-CCNC: 78 U/L (ref 40–130)
ALT SERPL-CCNC: 19 U/L (ref 5–41)
ANION GAP SERPL CALCULATED.3IONS-SCNC: 10 MMOL/L (ref 7–19)
AST SERPL-CCNC: 16 U/L (ref 5–40)
BACTERIA: NEGATIVE /HPF
BASOPHILS ABSOLUTE: 0 K/UL (ref 0–0.2)
BASOPHILS RELATIVE PERCENT: 0.4 % (ref 0–1)
BILIRUB SERPL-MCNC: 1 MG/DL (ref 0.2–1.2)
BILIRUBIN URINE: NEGATIVE
BLOOD, URINE: NEGATIVE
BUN BLDV-MCNC: 17 MG/DL (ref 8–23)
CALCIUM SERPL-MCNC: 9.6 MG/DL (ref 8.8–10.2)
CHLORIDE BLD-SCNC: 106 MMOL/L (ref 98–111)
CHOLESTEROL, TOTAL: 109 MG/DL (ref 160–199)
CLARITY: CLEAR
CO2: 25 MMOL/L (ref 22–29)
COLOR: YELLOW
CREAT SERPL-MCNC: 0.9 MG/DL (ref 0.5–1.2)
CRYSTALS, UA: ABNORMAL /HPF
EOSINOPHILS ABSOLUTE: 0.1 K/UL (ref 0–0.6)
EOSINOPHILS RELATIVE PERCENT: 1.2 % (ref 0–5)
EPITHELIAL CELLS, UA: 0 /HPF (ref 0–5)
GFR AFRICAN AMERICAN: >59
GFR NON-AFRICAN AMERICAN: >60
GLUCOSE BLD-MCNC: 92 MG/DL (ref 74–109)
GLUCOSE URINE: NEGATIVE MG/DL
HCT VFR BLD CALC: 44 % (ref 42–52)
HDLC SERPL-MCNC: 39 MG/DL (ref 55–121)
HEMOGLOBIN: 14.2 G/DL (ref 14–18)
HYALINE CASTS: 1 /HPF (ref 0–8)
IMMATURE GRANULOCYTES #: 0 K/UL
KETONES, URINE: ABNORMAL MG/DL
LDL CHOLESTEROL CALCULATED: 55 MG/DL
LEUKOCYTE ESTERASE, URINE: ABNORMAL
LYMPHOCYTES ABSOLUTE: 2.1 K/UL (ref 1.1–4.5)
LYMPHOCYTES RELATIVE PERCENT: 30.9 % (ref 20–40)
MCH RBC QN AUTO: 31.2 PG (ref 27–31)
MCHC RBC AUTO-ENTMCNC: 32.3 G/DL (ref 33–37)
MCV RBC AUTO: 96.7 FL (ref 80–94)
MONOCYTES ABSOLUTE: 0.4 K/UL (ref 0–0.9)
MONOCYTES RELATIVE PERCENT: 6.4 % (ref 0–10)
NEUTROPHILS ABSOLUTE: 4.1 K/UL (ref 1.5–7.5)
NEUTROPHILS RELATIVE PERCENT: 60.7 % (ref 50–65)
NITRITE, URINE: NEGATIVE
PDW BLD-RTO: 12.6 % (ref 11.5–14.5)
PH UA: 6 (ref 5–8)
PLATELET # BLD: 200 K/UL (ref 130–400)
PMV BLD AUTO: 10.6 FL (ref 9.4–12.4)
POTASSIUM SERPL-SCNC: 4.8 MMOL/L (ref 3.5–5)
PROSTATE SPECIFIC ANTIGEN: 1.37 NG/ML (ref 0–4)
PROTEIN UA: ABNORMAL MG/DL
RBC # BLD: 4.55 M/UL (ref 4.7–6.1)
RBC UA: 3 /HPF (ref 0–4)
SODIUM BLD-SCNC: 141 MMOL/L (ref 136–145)
SPECIFIC GRAVITY UA: 1.02 (ref 1–1.03)
TOTAL PROTEIN: 6.8 G/DL (ref 6.6–8.7)
TRIGL SERPL-MCNC: 77 MG/DL (ref 0–149)
TSH SERPL DL<=0.05 MIU/L-ACNC: 2.16 UIU/ML (ref 0.27–4.2)
UROBILINOGEN, URINE: 1 E.U./DL
VITAMIN D 25-HYDROXY: 56.6 NG/ML
WBC # BLD: 6.7 K/UL (ref 4.8–10.8)
WBC UA: 22 /HPF (ref 0–5)

## 2022-06-10 LAB — URINE CULTURE, ROUTINE: NORMAL

## 2022-06-13 ENCOUNTER — OFFICE VISIT (OUTPATIENT)
Dept: INTERNAL MEDICINE | Age: 77
End: 2022-06-13
Payer: MEDICARE

## 2022-06-13 VITALS
DIASTOLIC BLOOD PRESSURE: 68 MMHG | WEIGHT: 200 LBS | HEIGHT: 72 IN | BODY MASS INDEX: 27.09 KG/M2 | HEART RATE: 72 BPM | SYSTOLIC BLOOD PRESSURE: 128 MMHG | OXYGEN SATURATION: 98 %

## 2022-06-13 DIAGNOSIS — J44.9 CHRONIC OBSTRUCTIVE PULMONARY DISEASE, UNSPECIFIED COPD TYPE (HCC): ICD-10-CM

## 2022-06-13 DIAGNOSIS — I71.40 ABDOMINAL AORTIC ANEURYSM (AAA) WITHOUT RUPTURE: ICD-10-CM

## 2022-06-13 DIAGNOSIS — I10 HTN (HYPERTENSION), BENIGN: ICD-10-CM

## 2022-06-13 DIAGNOSIS — E55.9 VITAMIN D DEFICIENCY: ICD-10-CM

## 2022-06-13 DIAGNOSIS — E78.00 PURE HYPERCHOLESTEROLEMIA: ICD-10-CM

## 2022-06-13 DIAGNOSIS — Z13.6 ENCOUNTER FOR SCREENING FOR ABDOMINAL AORTIC ANEURYSM (AAA) IN PATIENT 50 YEARS OF AGE OR OLDER WITHOUT OTHER RISK FACTORS FOR AAA: Primary | ICD-10-CM

## 2022-06-13 DIAGNOSIS — Z12.2 ENCOUNTER FOR SCREENING FOR LUNG CANCER: ICD-10-CM

## 2022-06-13 DIAGNOSIS — I25.10 CHRONIC CORONARY ARTERY DISEASE: ICD-10-CM

## 2022-06-13 PROCEDURE — 3023F SPIROM DOC REV: CPT | Performed by: NURSE PRACTITIONER

## 2022-06-13 PROCEDURE — 1123F ACP DISCUSS/DSCN MKR DOCD: CPT | Performed by: NURSE PRACTITIONER

## 2022-06-13 PROCEDURE — G8427 DOCREV CUR MEDS BY ELIG CLIN: HCPCS | Performed by: NURSE PRACTITIONER

## 2022-06-13 PROCEDURE — G8417 CALC BMI ABV UP PARAM F/U: HCPCS | Performed by: NURSE PRACTITIONER

## 2022-06-13 PROCEDURE — 4004F PT TOBACCO SCREEN RCVD TLK: CPT | Performed by: NURSE PRACTITIONER

## 2022-06-13 PROCEDURE — 99214 OFFICE O/P EST MOD 30 MIN: CPT | Performed by: NURSE PRACTITIONER

## 2022-06-13 RX ORDER — CELECOXIB 100 MG/1
100 CAPSULE ORAL DAILY
Qty: 60 CAPSULE | Refills: 3 | Status: SHIPPED | OUTPATIENT
Start: 2022-06-13

## 2022-06-13 ASSESSMENT — ENCOUNTER SYMPTOMS
EYE ITCHING: 0
WHEEZING: 0
SORE THROAT: 0
BLOOD IN STOOL: 0
ABDOMINAL DISTENTION: 0
DIARRHEA: 0
VOMITING: 0
COLOR CHANGE: 0
CHOKING: 0
NAUSEA: 0
TROUBLE SWALLOWING: 0
STRIDOR: 0
CONSTIPATION: 0
SHORTNESS OF BREATH: 0
COUGH: 0
ABDOMINAL PAIN: 0
EYE DISCHARGE: 0

## 2022-06-13 ASSESSMENT — PATIENT HEALTH QUESTIONNAIRE - PHQ9
1. LITTLE INTEREST OR PLEASURE IN DOING THINGS: 0
SUM OF ALL RESPONSES TO PHQ QUESTIONS 1-9: 0
SUM OF ALL RESPONSES TO PHQ9 QUESTIONS 1 & 2: 0
SUM OF ALL RESPONSES TO PHQ QUESTIONS 1-9: 0
2. FEELING DOWN, DEPRESSED OR HOPELESS: 0

## 2022-06-13 NOTE — PATIENT INSTRUCTIONS
1 / osteoarthritis   Start celebryx 100 mg  Daily ; if that doesn't seem to help you can increase to 2 a day; Both at the same time but take WITH FOOD. 2.  CAd  The current medical regimen is effective;  continue present plan and medications. 3.  Chronic pain ; The current medical regimen is effective;  continue present plan and medications. 4.  Hyperlipideima; The current medical regimen is effective;  continue present plan and medications. 5.  Vit d ef; The current medical regimen is effective;  continue present plan and medications.   6.  Screening for AAA   7.  CT screening for lung cancer

## 2022-06-13 NOTE — PROGRESS NOTES
[address] INTERNAL MEDICINE  [address] [facility] [address]  [address] [place] [address]  Dept: [phone]  Dept Fax: [phone]  Loc: [phone]    Gary Deng (:  1945) is a 65 y.o. male,Established patient  with green, here for evaluation of the following chief complaint(s): 6 Month [address] [facility] Janusz is a 65 y.o. male who presents today for his medical conditions/complaints as noted below. Gary Deng is c/robinson 6 Month Follow-Up        HPI:     Chief Complaint   Patient presents with    6 Month Follow-Up     HPI   1. CAD stable on plavix   3. Hypertension stable with lisinopril 2.5  3. Chronic pain stable with OTC meds   4.   Hyperlipidemia on current dose of Mevacor 20 daily triglycerides are 77 cholesterol is 109  5 vitamin D deficiency stable 50,000 units weekly level is good today at 56.6      Past Medical History:   Diagnosis Date    Adenomatous colon polyp     CAD (coronary artery disease)     LAD stent 3/2005 [facility]    [facility]) [facility] [address]     Hypertension     Male erectile dysfunction     Pure hypercholesterolemia     Sick sinus syndrome ([facility] [address]     Vertigo       Past Surgical History:   Procedure Laterality Date    MASTOIDECTOMY      left ear 3/15 tympanomastoidectomy, Dr Yue Bermudez, [facility] ENT       Vitals 2022 12/15/2021 2021 2021 2020    SYSTOLIC [phone]   DIASTOLIC 68 68 62 72 80 85   Pulse 72 68 70 72 79 100   Temp - - - - - -   Resp - - - - - -   SpO2 98 99 97 97 - 97   Weight 200 lb 208 lb 204 lb 208 lb 202 lb 201 lb   Height 6' 0\" 6' 0\" 6' 0\" 6' 0\" 6' 0\" 6' 0\"   Body mass index 27.12 kg/m2 28.21 kg/m2 27.66 kg/m2 28.21 kg/m2 27.39 kg/m2 27.26 kg/m2   Some recent data might be hidden       Family History   Problem Relation Age of Onset    Breast Cancer Mother     Emphysema Father        Social History     Tobacco Use    Smoking status: Current Every Day Smoker Packs/day: 1.00     Years: 30.00     Pack years: 30.00     Types: Cigarettes    Smokeless tobacco: Never Used   Substance Use Topics    Alcohol use: No      Current Outpatient Medications   Medication Sig Dispense Refill    celecoxib (CELEBREX) 100 MG capsule Take 1 capsule by mouth daily 60 capsule 3    lovastatin (MEVACOR) 20 MG tablet TAKE ONE TABLET BY MOUTH NIGHTLY 90 tablet 3    ADVAIR DISKUS 250-50 MCG/DOSE AEPB INHALE 1 DOSE BY MOUTH TWICE DAILY 180 each 5    lisinopril (PRINIVIL;ZESTRIL) 2.5 MG tablet Take 1 tablet by mouth daily 90 tablet 3    clopidogrel (PLAVIX) 75 MG tablet Take 1 tablet by mouth once daily 90 tablet 3    azelastine (ASTELIN) 0.1 % nasal spray 1 spray by Nasal route 2 times daily Use in each nostril as directed 60 mL 5    fluticasone (FLONASE) 50 MCG/ACT nasal spray 2 sprays by Nasal route daily 16 g 5    nitroGLYCERIN (NITROSTAT) 0.4 MG SL tablet Place 1 tablet under the tongue every 5 minutes as needed for Chest pain up to max of 3 total doses. If no relief after 1 dose, call 911. 25 tablet 1    gabapentin (NEURONTIN) 300 MG capsule TAKE 1 CAPSULE BY MOUTH EVERY 8 HOURS AS NEEDED  5    Cyanocobalamin (VITAMIN B 12 PO) Take 1 tablet by mouth daily      aspirin 81 MG EC tablet Take 81 mg by mouth daily      oxyCODONE-acetaminophen (PERCOCET) 7.5-325 MG per tablet Take 1 tablet by mouth 2 times daily as needed.  ibuprofen (ADVIL;MOTRIN) 800 MG tablet Take 1 tablet by mouth 2 times daily as needed for Pain 120 tablet 1    vitamin D (ERGOCALCIFEROL) 1.25 MG (79364 UT) CAPS capsule Take 1 capsule by mouth once a week 12 capsule 1     No current facility-administered medications for this visit.      No Known Allergies    Health Maintenance   Topic Date Due    DTaP/Tdap/Td vaccine (1 - Tdap) Never done    COVID-19 Vaccine (1) 07/11/2022 (Originally 4/3/1950)    Shingles vaccine (1 of 2) 12/15/2022 (Originally 4/3/1994)    Low dose CT lung screening  07/22/2022    Flu vaccine (Season Ended) 09/01/2022    Annual Wellness Visit (AWV)  12/16/2022    Lipids  06/08/2023    Depression Screen  06/13/2023    Colorectal Cancer Screen  01/22/2029    Pneumococcal 65+ years Vaccine  Completed    Hepatitis C screen  Completed    Hepatitis A vaccine  Aged Out    Hepatitis B vaccine  Aged Out    Hib vaccine  Aged Out    Meningococcal (ACWY) vaccine  Aged Out       No results found for: LABA1C  Lab Results   Component Value Date    PSA 1.37 06/08/2022    PSA 1.60 12/30/2020    PSA 1.71 12/30/2019     TSH   Date Value Ref Range Status   06/08/2022 2.160 0.270 - 4.200 uIU/mL Final   ]  Lab Results   Component Value Date     06/08/2022    K 4.8 06/08/2022     06/08/2022    CO2 25 06/08/2022    BUN 17 06/08/2022    CREATININE 0.9 06/08/2022    GLUCOSE 92 06/08/2022    CALCIUM 9.6 06/08/2022    PROT 6.8 06/08/2022    LABALBU 4.3 06/08/2022    BILITOT 1.0 06/08/2022    ALKPHOS 78 06/08/2022    AST 16 06/08/2022    ALT 19 06/08/2022    LABGLOM >60 06/08/2022    GFRAA >59 06/08/2022     Lab Results   Component Value Date    CHOL 109 (L) 06/08/2022    CHOL 105 (L) 06/25/2021    CHOL 109 (L) 12/30/2020     Lab Results   Component Value Date    TRIG 77 06/08/2022    TRIG 89 06/25/2021    TRIG 99 12/30/2020     Lab Results   Component Value Date    HDL 39 (L) 06/08/2022    HDL 37 (L) 06/25/2021    HDL 39 (L) 12/30/2020     Lab Results   Component Value Date    LDLCALC 55 06/08/2022    LDLCALC 50 06/25/2021    LDLCALC 50 12/30/2020     Lab Results   Component Value Date     06/08/2022    K 4.8 06/08/2022     06/08/2022    CO2 25 06/08/2022    BUN 17 06/08/2022    CREATININE 0.9 06/08/2022    GLUCOSE 92 06/08/2022    CALCIUM 9.6 06/08/2022      Lab Results   Component Value Date    WBC 6.7 06/08/2022    HGB 14.2 06/08/2022    HCT 44.0 06/08/2022    MCV 96.7 (H) 06/08/2022     06/08/2022    LYMPHOPCT 30.9 06/08/2022    RBC 4.55 (L) 06/08/2022    MCH 31.2 (H) 06/08/2022    MCHC 32.3 (L) 06/08/2022    RDW 12.6 06/08/2022     Lab Results   Component Value Date    VITD25 56.6 06/08/2022     Labs reviewed from 6/8/2022    Subjective:      Review of Systems   Constitutional: Negative for fatigue, fever and unexpected weight change. HENT: Negative for ear discharge, ear pain, mouth sores, sore throat and trouble swallowing. Eyes: Negative for discharge, itching and visual disturbance. Respiratory: Negative for cough, choking, shortness of breath, wheezing and stridor. Cardiovascular: Negative for chest pain, palpitations and leg swelling. Gastrointestinal: Negative for abdominal distention, abdominal pain, blood in stool, constipation, diarrhea, nausea and vomiting. Endocrine: Negative for cold intolerance, polydipsia and polyuria. Genitourinary: Negative for difficulty urinating, dysuria, frequency and urgency. Musculoskeletal: Negative for arthralgias and gait problem. Skin: Negative for color change and rash. Allergic/Immunologic: Negative for food allergies and immunocompromised state. Neurological: Negative for dizziness, tremors, syncope, speech difficulty, weakness and headaches. Hematological: Negative for adenopathy. Does not bruise/bleed easily. Psychiatric/Behavioral: Negative for confusion and hallucinations. Objective:     Physical Exam  Constitutional:       General: He is not in acute distress. Appearance: He is well-developed. HENT:      Head: Normocephalic and atraumatic. Eyes:      General: No scleral icterus. Right eye: No discharge. Left eye: No discharge. Pupils: Pupils are equal, round, and reactive to light. Neck:      Thyroid: No thyromegaly. Vascular: No JVD. Cardiovascular:      Rate and Rhythm: Normal rate and regular rhythm. Heart sounds: Normal heart sounds. No murmur heard. Pulmonary:      Effort: Pulmonary effort is normal. No respiratory distress.       Breath sounds: Normal breath sounds. No wheezing or rales. Abdominal:      General: Bowel sounds are normal. There is no distension. Palpations: Abdomen is soft. There is no mass. Tenderness: There is no abdominal tenderness. There is no guarding or rebound. Musculoskeletal:         General: No tenderness. Normal range of motion. Cervical back: Normal range of motion and neck supple. Skin:     General: Skin is warm and dry. Findings: No erythema or rash. Neurological:      Mental Status: He is alert and oriented to person, place, and time. Cranial Nerves: No cranial nerve deficit. Coordination: Coordination normal.      Deep Tendon Reflexes: Reflexes are normal and symmetric. Reflexes normal.   Psychiatric:         Mood and Affect: Mood is not depressed. Behavior: Behavior normal.         Thought Content:  Thought content normal.         Judgment: Judgment normal.       /68   Pulse 72   Ht 6' (1.829 m)   Wt 200 lb (90.7 kg)   SpO2 98%   BMI 27.12 kg/m²           Assessment:      Problem List     Abdominal aortic aneurysm (AAA) without rupture (HCC)    Chronic coronary artery disease               Relevant Medications    aspirin 81 MG EC tablet    nitroGLYCERIN (NITROSTAT) 0.4 MG SL tablet    lisinopril (PRINIVIL;ZESTRIL) 2.5 MG tablet    lovastatin (MEVACOR) 20 MG tablet    COPD (chronic obstructive pulmonary disease) (HCC)    Relevant Medications    azelastine (ASTELIN) 0.1 % nasal spray    fluticasone (FLONASE) 50 MCG/ACT nasal spray    ADVAIR DISKUS 250-50 MCG/DOSE AEPB    HTN (hypertension), benign     Stable with lisinopril 2.5          Relevant Orders    CBC with Auto Differential    Comprehensive Metabolic Panel    Urinalysis with Reflex to Culture    TSH    Pure hypercholesterolemia     Stable on Mevacor 20 triglycerides are 77 cholesterol 109          Relevant Medications    aspirin 81 MG EC tablet    nitroGLYCERIN (NITROSTAT) 0.4 MG SL tablet    lisinopril Much of this encounter note is electronic transcription/translation of spoken language to printed texts. The electronic translation of spoken language may be erroneous, or at times,nonsensical words or phrases may be inadvertently transcribed.   Although I have reviewed the note for such errors, some may still exist.

## 2022-06-14 ENCOUNTER — TELEPHONE (OUTPATIENT)
Dept: PRIMARY CARE CLINIC | Age: 77
End: 2022-06-14

## 2022-06-14 NOTE — TELEPHONE ENCOUNTER
I attempted to reach pt to notify him of the appointments for imaging:    NPO 8 hours prior to test US AA on 7/28/2022 at 7:45am [address] at [facility], [place] Lung to follow. I left patient message requesting they call back to be notified of appt information.

## 2022-06-20 ENCOUNTER — OFFICE VISIT (OUTPATIENT)
Dept: ENT CLINIC | Age: 77
End: 2022-06-20
Payer: MEDICARE

## 2022-06-20 VITALS
HEIGHT: 72 IN | DIASTOLIC BLOOD PRESSURE: 68 MMHG | SYSTOLIC BLOOD PRESSURE: 118 MMHG | WEIGHT: 200 LBS | BODY MASS INDEX: 27.09 KG/M2

## 2022-06-20 DIAGNOSIS — H60.311 ACUTE DIFFUSE OTITIS EXTERNA OF RIGHT EAR: Primary | ICD-10-CM

## 2022-06-20 DIAGNOSIS — H61.22 IMPACTED CERUMEN OF LEFT EAR: ICD-10-CM

## 2022-06-20 PROCEDURE — 4130F TOPICAL PREP RX AOE: CPT | Performed by: PHYSICIAN ASSISTANT

## 2022-06-20 PROCEDURE — 69210 REMOVE IMPACTED EAR WAX UNI: CPT | Performed by: PHYSICIAN ASSISTANT

## 2022-06-20 PROCEDURE — 1123F ACP DISCUSS/DSCN MKR DOCD: CPT | Performed by: PHYSICIAN ASSISTANT

## 2022-06-20 PROCEDURE — G8417 CALC BMI ABV UP PARAM F/U: HCPCS | Performed by: PHYSICIAN ASSISTANT

## 2022-06-20 PROCEDURE — G8427 DOCREV CUR MEDS BY ELIG CLIN: HCPCS | Performed by: PHYSICIAN ASSISTANT

## 2022-06-20 PROCEDURE — 4004F PT TOBACCO SCREEN RCVD TLK: CPT | Performed by: PHYSICIAN ASSISTANT

## 2022-06-20 PROCEDURE — 99203 OFFICE O/P NEW LOW 30 MIN: CPT | Performed by: PHYSICIAN ASSISTANT

## 2022-06-20 RX ORDER — OFLOXACIN 3 MG/ML
4 SOLUTION/ DROPS OPHTHALMIC 3 TIMES DAILY
Qty: 10 ML | Refills: 0 | Status: SHIPPED | OUTPATIENT
Start: 2022-06-20 | End: 2022-06-30

## 2022-06-20 ASSESSMENT — ENCOUNTER SYMPTOMS
VOICE CHANGE: 0
EYE PAIN: 0
SINUS PAIN: 0
TROUBLE SWALLOWING: 0
PHOTOPHOBIA: 0
FACIAL SWELLING: 0
RHINORRHEA: 0
SORE THROAT: 0
SINUS PRESSURE: 0

## 2022-06-20 NOTE — ASSESSMENT & PLAN NOTE
Cerumen impaction of the left ear-successfully removed with microscopic guidance  Plan: Patient is follow-up in 6 months for cerumen check due to his history.

## 2022-06-20 NOTE — PROGRESS NOTES
[facility] OTOLARYNGOLOGY/ENT  Mr. Hossein Waggoner is a pleasant 62-year-old  male that was referred by Julien Mccray due to problems with recurrent cerumen impactions. Patient has a history of a questionable tympanomastoidectomy to the left ear that was performed at [facility]. Since the original surgery, patient has been going to [facility] every 6 months for cerumen disimpactions. Due to the [place] traffic as well as gas prices, he is requesting for this to be done locally. Patient reports of near-total loss of hearing to the left ear since the original surgery. He reports it has been several years since he has had his hearing checked but was told that he did not qualify for a cochlear implant. He is frustrated due to depending on his right ear for all hearing. He admits to muffled hearing to the right ear which has been occurring over the last several months. Allergies: Patient has no known allergies. Current Outpatient Medications   Medication Sig Dispense Refill    ofloxacin (OCUFLOX) 0.3 % solution Place 4 drops in ear(s) 3 times daily for 10 days Right ear 10 mL 0    celecoxib (CELEBREX) 100 MG capsule Take 1 capsule by mouth daily 60 capsule 3    lovastatin (MEVACOR) 20 MG tablet TAKE ONE TABLET BY MOUTH NIGHTLY 90 tablet 3    ADVAIR DISKUS 250-50 MCG/DOSE AEPB INHALE 1 DOSE BY MOUTH TWICE DAILY 180 each 5    lisinopril (PRINIVIL;ZESTRIL) 2.5 MG tablet Take 1 tablet by mouth daily 90 tablet 3    clopidogrel (PLAVIX) 75 MG tablet Take 1 tablet by mouth once daily 90 tablet 3    azelastine (ASTELIN) 0.1 % nasal spray 1 spray by Nasal route 2 times daily Use in each nostril as directed 60 mL 5    fluticasone (FLONASE) 50 MCG/ACT nasal spray 2 sprays by Nasal route daily 16 g 5    nitroGLYCERIN (NITROSTAT) 0.4 MG SL tablet Place 1 tablet under the tongue every 5 minutes as needed for Chest pain up to max of 3 total doses.  If no relief after 1 dose, call 021. 25 tablet 1    gabapentin (NEURONTIN) 300 MG capsule TAKE 1 CAPSULE BY MOUTH EVERY 8 HOURS AS NEEDED  5    Cyanocobalamin (VITAMIN B 12 PO) Take 1 tablet by mouth daily      aspirin 81 MG EC tablet Take 81 mg by mouth daily      oxyCODONE-acetaminophen (PERCOCET) 7.5-325 MG per tablet Take 1 tablet by mouth 2 times daily as needed.  ibuprofen (ADVIL;MOTRIN) 800 MG tablet Take 1 tablet by mouth 2 times daily as needed for Pain 120 tablet 1    vitamin D (ERGOCALCIFEROL) 1.25 MG (71902 UT) CAPS capsule Take 1 capsule by mouth once a week 12 capsule 1     No current facility-administered medications for this visit. Past Surgical History:   Procedure Laterality Date    MASTOIDECTOMY      left ear 3/15 tympanomastoidectomy, Dr Amalia Woodard, [facility] ENT       Past Medical History:   Diagnosis Date    Adenomatous colon polyp     CAD (coronary artery disease)     LAD stent 3/2005 [facility]    COPD ([facility] obstructive [facility]) ([facility] [address]     Hypertension     Male erectile dysfunction     Pure hypercholesterolemia     Sick sinus syndrome ([facility] [address]     Vertigo        Family History   Problem Relation Age of Onset    Breast Cancer Mother     Emphysema Father        Social History     Tobacco Use    Smoking status: Current Every Day Smoker     Packs/day: 1.00     Years: 30.00     Pack years: 30.00     Types: Cigarettes    Smokeless tobacco: Never Used   Substance Use Topics    Alcohol use: No           REVIEW OF SYSTEMS:  all other systems reviewed and are negative  Review of Systems   Constitutional: Negative for chills and fever. HENT: Positive for hearing loss (Left ear - since his surgery). Negative for congestion, dental problem, ear discharge, ear pain, facial swelling, postnasal drip, rhinorrhea, sinus pressure, sinus pain, sore throat, tinnitus, trouble swallowing and voice change. Eyes: Negative for photophobia and pain. Neurological: Negative for dizziness and headaches.            Comments: PHYSICAL EXAM:    /68   Ht 6' (1.829 m)   Wt 200 lb (90.7 kg)   BMI 27.12 kg/m²   Body mass index is 27.12 kg/m². General Appearance: well developed  and well nourished  Head/ Face: normocephalic and atraumatic  Vocal Quality: good/ normal  Ears: Right Ear: External: external ears normal Otoscopy Ear Canal: purulent discharge Otoscopy TM: TM's normal and TM's mobile Left Ear: External: external ears normal Otoscopy Ear Canal: cerumen impaction Otoscopy TM: TM's normal and TM's mobile  Hearing: Rinne A>B: Right, Rinne A<B: Left and Ellison R  Nose: nares normal and septum midline  Neck: supple and adenopathy none palpable  Thyroid: normal and nodules No    Assessment & Plan:    Problem List Items Addressed This Visit     Impacted cerumen of left ear     Cerumen impaction of the left ear-successfully removed with microscopic guidance  Plan: Patient is follow-up in 6 months for cerumen check due to his history. Relevant Orders    19861 - [place] REMOVE IMPACTED EAR WAX (Completed)    Acute diffuse otitis externa of right ear - Primary     Otitis externa of the right ear  Plan: I will place the patient on ofloxacin eardrops 3 times a day for 10 days. Patient is to follow-up in 2 weeks for reevaluation. Once the infection has resolved, I will have him to see [facility] for repeat audiology screening to see if he qualifies for a possible cochlear implant to the left ear. Orders Placed This Encounter   Procedures    74228 - [place] REMOVE IMPACTED EAR WAX     With microscopic guidance, the cerumen impaction was removed from the left ear with assistance of alligator graspers without any complications. Patient has had a prior mastoidectomy to the left side. The right ear exam demonstrated evidence of otitis externa with no evidence of cerumen.        Orders Placed This Encounter   Medications    ofloxacin (OCUFLOX) 0.3 % solution     Sig: Place 4 drops in ear(s) 3 times daily for 10 days Right ear Dispense:  10 mL     Refill:  0       Electronically signed by Lázaro Cedeno PA-C on 6/20/22 at 7:00 PM CDT          Please note that this chart was generated using dragon dictation software. Although every effort was made to ensure the accuracy of this automated transcription, some errors in transcription may have occurred.

## 2022-06-21 NOTE — ASSESSMENT & PLAN NOTE
Otitis externa of the right ear  Plan: I will place the patient on ofloxacin eardrops 3 times a day for 10 days. Patient is to follow-up in 2 weeks for reevaluation. Once the infection has resolved, I will have him to see Verónica for repeat audiology screening to see if he qualifies for a possible cochlear implant to the left ear.

## 2022-06-28 ENCOUNTER — HOSPITAL ENCOUNTER (OUTPATIENT)
Dept: CT IMAGING | Age: 77
Discharge: HOME | End: 2022-06-28
Payer: MEDICARE

## 2022-06-28 ENCOUNTER — HOSPITAL ENCOUNTER (OUTPATIENT)
Dept: ULTRASOUND IMAGING | Age: 77
Discharge: HOME | End: 2022-06-28
Payer: MEDICARE

## 2022-06-28 DIAGNOSIS — Z12.2 ENCOUNTER FOR SCREENING FOR LUNG CANCER: ICD-10-CM

## 2022-06-28 DIAGNOSIS — R91.1 PULMONARY NODULE: ICD-10-CM

## 2022-06-28 DIAGNOSIS — Z13.6 ENCOUNTER FOR SCREENING FOR ABDOMINAL AORTIC ANEURYSM (AAA) IN PATIENT 50 YEARS OF AGE OR OLDER WITHOUT OTHER RISK FACTORS FOR AAA: ICD-10-CM

## 2022-06-28 DIAGNOSIS — J44.9 CHRONIC OBSTRUCTIVE PULMONARY DISEASE, UNSPECIFIED COPD TYPE (HCC): ICD-10-CM

## 2022-06-28 PROCEDURE — 76775 US EXAM ABDO BACK WALL LIM: CPT

## 2022-06-28 PROCEDURE — 71271 CT THORAX LUNG CANCER SCR C-: CPT | Performed by: RADIOLOGY

## 2022-06-28 PROCEDURE — 76775 US EXAM ABDO BACK WALL LIM: CPT | Performed by: RADIOLOGY

## 2022-06-28 PROCEDURE — 71271 CT THORAX LUNG CANCER SCR C-: CPT

## 2022-06-29 DIAGNOSIS — E27.8 RIGHT ADRENAL MASS (HCC): Primary | ICD-10-CM

## 2022-07-05 ENCOUNTER — PROCEDURE VISIT (OUTPATIENT)
Dept: ENT CLINIC | Age: 77
End: 2022-07-05
Payer: MEDICARE

## 2022-07-05 ENCOUNTER — OFFICE VISIT (OUTPATIENT)
Dept: ENT CLINIC | Age: 77
End: 2022-07-05
Payer: MEDICARE

## 2022-07-05 ENCOUNTER — TELEPHONE (OUTPATIENT)
Dept: OTHER | Age: 77
End: 2022-07-05

## 2022-07-05 VITALS
DIASTOLIC BLOOD PRESSURE: 64 MMHG | WEIGHT: 200 LBS | BODY MASS INDEX: 27.09 KG/M2 | SYSTOLIC BLOOD PRESSURE: 122 MMHG | HEIGHT: 72 IN

## 2022-07-05 DIAGNOSIS — H90.A31 MIXED CONDUCTIVE AND SENSORINEURAL HEARING LOSS OF RIGHT EAR WITH RESTRICTED HEARING OF LEFT EAR: Primary | ICD-10-CM

## 2022-07-05 DIAGNOSIS — H72.92 PERFORATED TYMPANIC MEMBRANE ON EXAMINATION, LEFT: ICD-10-CM

## 2022-07-05 DIAGNOSIS — H60.311 ACUTE DIFFUSE OTITIS EXTERNA OF RIGHT EAR: Primary | ICD-10-CM

## 2022-07-05 DIAGNOSIS — H91.8X2 FLAT HEARING LOSS OF LEFT EAR: ICD-10-CM

## 2022-07-05 DIAGNOSIS — H90.A31 MIXED CONDUCTIVE AND SENSORINEURAL HEARING LOSS OF RIGHT EAR WITH RESTRICTED HEARING OF LEFT EAR: ICD-10-CM

## 2022-07-05 PROBLEM — H90.72 MIXED HEARING LOSS OF LEFT EAR: Status: RESOLVED | Noted: 2017-11-06 | Resolved: 2022-07-05

## 2022-07-05 PROBLEM — H65.91 RIGHT NON-SUPPURATIVE OTITIS MEDIA: Status: RESOLVED | Noted: 2021-07-01 | Resolved: 2022-07-05

## 2022-07-05 PROBLEM — H61.22 IMPACTED CERUMEN OF LEFT EAR: Status: RESOLVED | Noted: 2022-06-20 | Resolved: 2022-07-05

## 2022-07-05 PROCEDURE — G8417 CALC BMI ABV UP PARAM F/U: HCPCS | Performed by: PHYSICIAN ASSISTANT

## 2022-07-05 PROCEDURE — 1123F ACP DISCUSS/DSCN MKR DOCD: CPT | Performed by: PHYSICIAN ASSISTANT

## 2022-07-05 PROCEDURE — 4004F PT TOBACCO SCREEN RCVD TLK: CPT | Performed by: PHYSICIAN ASSISTANT

## 2022-07-05 PROCEDURE — 4130F TOPICAL PREP RX AOE: CPT | Performed by: PHYSICIAN ASSISTANT

## 2022-07-05 PROCEDURE — 99213 OFFICE O/P EST LOW 20 MIN: CPT | Performed by: PHYSICIAN ASSISTANT

## 2022-07-05 PROCEDURE — 92553 AUDIOMETRY AIR & BONE: CPT | Performed by: AUDIOLOGIST

## 2022-07-05 PROCEDURE — G8427 DOCREV CUR MEDS BY ELIG CLIN: HCPCS | Performed by: PHYSICIAN ASSISTANT

## 2022-07-05 NOTE — TELEPHONE ENCOUNTER
Patient had his ct lung screening and showed additional finding. I marked the \"s\" box so a comment will show on the findings to alert the Olga Beverly. I called the office the day the report was read to notify office staff.   Reviewing the notes, Saundra Stewart is aware of finding and orders have been placed

## 2022-07-05 NOTE — PROGRESS NOTES
Mr. Juancho Ingram is a pleasant 76-year-old  male that presents for a 2-week follow-up after treatment for right-sided otitis externa. Patient reported that he was unable to  the Cortisporin drops due to cost.  He has been utilizing Ciprodex and feels like the ear is better. He denies any drainage or any new issues. Audiology studies were completed today and reviewed with the patient. Patient was noted with severe to profound flat hearing loss to the left ear with the right ear demonstrating mild to profound sloping mixed hearing loss. Overall is felt that the TM perforation is contributing to his hearing loss. Physical examination with microscope revealed the otitis externa to be resolved. The residual medication was suction down to the TM without any complications. The left ear demonstrated evidence of a chronic TM perforation of about 50% in the central portion that appears to be noninfected. Neck exam demonstrated no lymphadenopathy or thyromegaly. Impression: Resolved right-sided otitis externa, chronic TM perforation of left ear-currently stable    Plan: I recommended a 3-month follow-up for monitoring of the left TM perforation. He was advised to call if he has any questions or problems.       Electronically signed by Dainel Duron PA-C on 7/5/22 at 11:54 AM GOGO

## 2022-07-05 NOTE — ASSESSMENT & PLAN NOTE
Chronic left-sided TM perforation with no evidence of infection  Plan: I advised the patient to wear earplugs when he swims or showers. He is to follow-up in 3 months for reevaluation.

## 2022-07-05 NOTE — ASSESSMENT & PLAN NOTE
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   We will continue monitoring his hearing

## 2022-07-05 NOTE — PROGRESS NOTES
History   Mariusz Salvador is a 65 y.o. male who presented to the clinic this date with complaints of decreased hearing. He reported history of left ear surgery several years ago and has had decreased hearing in that ear since that time. He also reported decreased hearing in his right ear. He currently wears a hearing aid in the right ear. He has one for his left ear but does not wear it due to insufficient benefit. He has recently been treated for otitis externa in his right ear. Summary   Did not perform tympanometry due to slight otorrhea right and surgically altered EAC left. Pure tone testing indicates mild to profound mixed hearing loss right and severe to profound hearing loss left. Could not mask left ear bone thresholds. Results   Otoscopy:    Right: Otorrhea   Left: Surgically altered EAC    Audiometry:    Right: Mild to profound sloping mixed hearing loss   Left: Severe to profound flat hearing loss           Plan   Results of today's testing were discussed with Mr. Clarke Ordaz and the following recommendations were made:    1. Follow up with ENT as scheduled. 2. Recheck hearing following medical management.         Audiogram and Acoustic Immittance

## 2022-07-20 ENCOUNTER — HOSPITAL ENCOUNTER (OUTPATIENT)
Dept: CT IMAGING | Age: 77
Discharge: HOME | End: 2022-07-20
Payer: MEDICARE

## 2022-07-20 DIAGNOSIS — E27.8 RIGHT ADRENAL MASS (HCC): ICD-10-CM

## 2022-07-20 LAB
GFR AFRICAN AMERICAN: >60
GFR NON-AFRICAN AMERICAN: >60
PERFORMED ON: NORMAL
POC CREATININE: 1 MG/DL (ref 0.3–1.3)
POC SAMPLE TYPE: NORMAL

## 2022-07-20 PROCEDURE — 82565 ASSAY OF CREATININE: CPT

## 2022-07-20 PROCEDURE — 74178 CT ABD&PLV WO CNTR FLWD CNTR: CPT | Performed by: RADIOLOGY

## 2022-07-20 PROCEDURE — 74178 CT ABD&PLV WO CNTR FLWD CNTR: CPT

## 2022-07-20 PROCEDURE — 6360000004 HC RX CONTRAST MEDICATION: Performed by: NURSE PRACTITIONER

## 2022-07-20 RX ADMIN — IOPAMIDOL 90 ML: 755 INJECTION, SOLUTION INTRAVENOUS at 08:40

## 2022-09-06 RX ORDER — ERGOCALCIFEROL 1.25 MG/1
CAPSULE ORAL
Qty: 12 CAPSULE | Refills: 1 | Status: SHIPPED | OUTPATIENT
Start: 2022-09-06

## 2022-09-06 NOTE — TELEPHONE ENCOUNTER
Horacio Saldana called requesting a refill of the below medication which has been pended for you:     Requested Prescriptions     Pending Prescriptions Disp Refills    vitamin D (ERGOCALCIFEROL) 1.25 MG (31637 UT) CAPS capsule [Pharmacy Med Name: VITAMIN D2 50,000IU (ERGO) CAP RX] 12 capsule 1     Sig: TAKE 1 CAPSULE BY MOUTH 1 TIME A WEEK       Last Appointment Date: 6/13/2022  Next Appointment Date: 12/13/2022    No Known Allergies

## 2022-10-03 RX ORDER — FLUTICASONE PROPIONATE AND SALMETEROL 250; 50 UG/1; UG/1
POWDER RESPIRATORY (INHALATION)
Qty: 60 EACH | Refills: 2 | Status: SHIPPED | OUTPATIENT
Start: 2022-10-03

## 2022-10-03 RX ORDER — CLOPIDOGREL BISULFATE 75 MG/1
TABLET ORAL
Qty: 90 TABLET | Refills: 1 | Status: SHIPPED | OUTPATIENT
Start: 2022-10-03

## 2022-10-03 NOTE — TELEPHONE ENCOUNTER
Anne Hollins called requesting a refill of the below medication which has been pended for you:     Requested Prescriptions     Pending Prescriptions Disp Refills    clopidogrel (PLAVIX) 75 MG tablet [Pharmacy Med Name: CLOPIDOGREL 75MG TABLETS] 90 tablet 1     Sig: TAKE 1 TABLET BY MOUTH EVERY DAY       Last Appointment Date: 6/13/2022  Next Appointment Date: 12/13/2022    No Known Allergies

## 2022-10-13 ENCOUNTER — OFFICE VISIT (OUTPATIENT)
Dept: ENT CLINIC | Age: 77
End: 2022-10-13
Payer: MEDICARE

## 2022-10-13 VITALS
BODY MASS INDEX: 27.9 KG/M2 | HEIGHT: 72 IN | WEIGHT: 206 LBS | DIASTOLIC BLOOD PRESSURE: 74 MMHG | SYSTOLIC BLOOD PRESSURE: 126 MMHG

## 2022-10-13 DIAGNOSIS — H91.8X2 FLAT HEARING LOSS OF LEFT EAR: ICD-10-CM

## 2022-10-13 DIAGNOSIS — H60.311 ACUTE DIFFUSE OTITIS EXTERNA OF RIGHT EAR: Primary | ICD-10-CM

## 2022-10-13 PROBLEM — H60.8X1 CHRONIC ACTINIC OTITIS EXTERNA OF RIGHT EAR: Status: ACTIVE | Noted: 2022-10-13

## 2022-10-13 PROCEDURE — G8427 DOCREV CUR MEDS BY ELIG CLIN: HCPCS | Performed by: PHYSICIAN ASSISTANT

## 2022-10-13 PROCEDURE — G8417 CALC BMI ABV UP PARAM F/U: HCPCS | Performed by: PHYSICIAN ASSISTANT

## 2022-10-13 PROCEDURE — G8484 FLU IMMUNIZE NO ADMIN: HCPCS | Performed by: PHYSICIAN ASSISTANT

## 2022-10-13 PROCEDURE — 99213 OFFICE O/P EST LOW 20 MIN: CPT | Performed by: PHYSICIAN ASSISTANT

## 2022-10-13 PROCEDURE — 4004F PT TOBACCO SCREEN RCVD TLK: CPT | Performed by: PHYSICIAN ASSISTANT

## 2022-10-13 PROCEDURE — 4130F TOPICAL PREP RX AOE: CPT | Performed by: PHYSICIAN ASSISTANT

## 2022-10-13 PROCEDURE — 1123F ACP DISCUSS/DSCN MKR DOCD: CPT | Performed by: PHYSICIAN ASSISTANT

## 2022-10-13 RX ORDER — OFLOXACIN 3 MG/ML
SOLUTION/ DROPS OPHTHALMIC
Qty: 10 ML | Refills: 0 | Status: SHIPPED | OUTPATIENT
Start: 2022-10-13

## 2022-10-13 ASSESSMENT — ENCOUNTER SYMPTOMS
TROUBLE SWALLOWING: 0
SINUS PAIN: 0
RHINORRHEA: 0
SINUS PRESSURE: 0
VOICE CHANGE: 0
PHOTOPHOBIA: 0
EYE PAIN: 0
FACIAL SWELLING: 0
SORE THROAT: 0

## 2022-10-13 NOTE — PROGRESS NOTES
[facility] OTOLARYNGOLOGY/ENT  Mr. Caitlyn Dominguez is a pleasant 78-year-old  male that presents for a 2-week follow-up due to right-sided otitis externa. Patient reports that he feels like the ear is better and he is able to wear his hearing aid again. He reports that he is still frustrated due to having no hearing from the left ear. He was inquiring about a potential cochlear implant. Previous audiology screening demonstrated severe to profound flat hearing loss to the left with the right demonstrating mild to profound sloping mixed hearing loss. Patient reports that he has done a trial with a hearing aid to the left ear and reports that he had very poor improvement. Patient reports that he has a previous patient of ENT at [facility] due to prior surgeries. Allergies: Patient has no known allergies. Current Outpatient Medications   Medication Sig Dispense Refill    ofloxacin (OCUFLOX) 0.3 % solution 4 drops to the right ear twice a day x10 days 10 mL 0    fluticasone-salmeterol (ADVAIR DISKUS) 250-50 MCG/ACT AEPB diskus inhaler INHALE 1 PUFF BY MOUTH TWICE DAILY 60 each 2    clopidogrel (PLAVIX) 75 MG tablet TAKE 1 TABLET BY MOUTH EVERY DAY 90 tablet 1    vitamin D (ERGOCALCIFEROL) 1.25 MG (16472 UT) CAPS capsule TAKE 1 CAPSULE BY MOUTH 1 TIME A WEEK 12 capsule 1    celecoxib (CELEBREX) 100 MG capsule Take 1 capsule by mouth daily 60 capsule 3    lovastatin (MEVACOR) 20 MG tablet TAKE ONE TABLET BY MOUTH NIGHTLY 90 tablet 3    lisinopril (PRINIVIL;ZESTRIL) 2.5 MG tablet Take 1 tablet by mouth daily 90 tablet 3    azelastine (ASTELIN) 0.1 % nasal spray 1 spray by Nasal route 2 times daily Use in each nostril as directed 60 mL 5    fluticasone (FLONASE) 50 MCG/ACT nasal spray 2 sprays by Nasal route daily 16 g 5    nitroGLYCERIN (NITROSTAT) 0.4 MG SL tablet Place 1 tablet under the tongue every 5 minutes as needed for Chest pain up to max of 3 total doses.  If no relief after 1 dose, call 652. 96 tablet 1    gabapentin (NEURONTIN) 300 MG capsule TAKE 1 CAPSULE BY MOUTH EVERY 8 HOURS AS NEEDED  5    Cyanocobalamin (VITAMIN B 12 PO) Take 1 tablet by mouth daily      aspirin 81 MG EC tablet Take 81 mg by mouth daily      oxyCODONE-acetaminophen (PERCOCET) 7.5-325 MG per tablet Take 1 tablet by mouth 2 times daily as needed. ibuprofen (ADVIL;MOTRIN) 800 MG tablet Take 1 tablet by mouth 2 times daily as needed for Pain 120 tablet 1     No current facility-administered medications for this visit. Past Surgical History:   Procedure Laterality Date    MASTOIDECTOMY      left ear 3/15 tympanomastoidectomy, Dr Jason Oliver, [address] [facility]       Past Medical History:   Diagnosis Date    Adenomatous colon polyp     CAD (coronary artery disease)     LAD stent 3/2005 [facility]    COPD (chronic obstructive pulmonary disease) ([facility] [address]     Hypertension     Male erectile dysfunction     Pure hypercholesterolemia     Sick sinus syndrome ([facility] [address]     Vertigo        Family History   Problem Relation Age of Onset    Breast Cancer Mother     Emphysema Father        Social History     Tobacco Use    Smoking status: Every Day     Packs/day: 1.00     Years: 30.00     Pack years: 30.00     Types: Cigarettes    Smokeless tobacco: Never   Substance Use Topics    Alcohol use: No           REVIEW OF SYSTEMS:  all other systems reviewed and are negative  Review of Systems   Constitutional:  Negative for chills and fever. HENT:  Positive for hearing loss (Left). Negative for congestion, dental problem, ear discharge, ear pain, facial swelling, postnasal drip, rhinorrhea, sinus pressure, sinus pain, sore throat, tinnitus, trouble swallowing and voice change. Eyes:  Negative for photophobia and pain. Neurological:  Negative for dizziness and headaches. Comments:     PHYSICAL EXAM:    /74   Ht 6' (1.829 m)   Wt 206 lb (93.4 kg)   BMI 27.94 kg/m²   Body mass index is 27.94 kg/m².     General Appearance: well developed  and well nourished  Head/ Face: normocephalic and atraumatic  Vocal Quality: good/ normal  Ears: Right Ear: External: external ears normal Otoscopy Ear Canal: purulent discharge Otoscopy TM: TM's normal and TM's mobile Left Ear: External: external ears normal Otoscopy Ear Canal: canal clear Otoscopy TM: TM's normal and TM's mobile  Hearing: see audiogram  Nose: nares normal and septum midline  Neck: supple and adenopathy none palpable  Thyroid: normal and nodules No  Oral exam demonstrated the tongue to be midline with no abnormalities to the posterior pharynx. Of note, patient admits to using the eardrops once a week since last seen. Assessment & Plan:    Problem List Items Addressed This Visit       Acute diffuse otitis externa of right ear - Primary      Otitis externa of the right ear-still persisting due to noncompliance  Plan: I will place the patient on ofloxacin eardrops twice a day for 10 days. Patient was advised to call if he is still having drainage. I will have him to follow-up in 3 months for cerumen check due to his history of a mastoidectomy. Flat hearing loss of left ear     Flat hearing loss to the left ear-confirmed by audiology screening with failed hearing aid trial  Plan: I advised the patient to consider a cochlear implant at [facility]. He reports that he will call if he wishes to proceed. No orders of the defined types were placed in this encounter. Orders Placed This Encounter   Medications    ofloxacin (OCUFLOX) 0.3 % solution     Si drops to the right ear twice a day x10 days     Dispense:  10 mL     Refill:  0       Electronically signed by Denisa Crawford PA-C on 10/13/22 at 4:32 PM CDT        Please note that this chart was generated using dragon dictation software. Although every effort was made to ensure the accuracy of this automated transcription, some errors in transcription may have occurred.

## 2022-10-13 NOTE — ASSESSMENT & PLAN NOTE
Flat hearing loss to the left ear-confirmed by audiology screening with failed hearing aid trial  Plan: I advised the patient to consider a cochlear implant at [address] [place] [address]. He reports that he will call if he wishes to proceed.

## 2022-10-13 NOTE — ASSESSMENT & PLAN NOTE
Otitis externa of the right ear-still persisting due to noncompliance  Plan: I will place the patient on ofloxacin eardrops twice a day for 10 days. Patient was advised to call if he is still having drainage. I will have him to follow-up in 3 months for cerumen check due to his history of a mastoidectomy.

## 2022-10-20 ENCOUNTER — TELEMEDICINE (OUTPATIENT)
Dept: INTERNAL MEDICINE | Age: 77
End: 2022-10-20
Payer: MEDICARE

## 2022-10-20 DIAGNOSIS — J40 BRONCHITIS: ICD-10-CM

## 2022-10-20 PROCEDURE — 99442 PR PHYS/QHP TELEPHONE EVALUATION 11-20 MIN: CPT | Performed by: NURSE PRACTITIONER

## 2022-10-20 RX ORDER — LEVOFLOXACIN 500 MG/1
500 TABLET, FILM COATED ORAL DAILY
Qty: 7 TABLET | Refills: 0 | Status: SHIPPED | OUTPATIENT
Start: 2022-10-20 | End: 2022-10-27

## 2022-10-20 RX ORDER — METHYLPREDNISOLONE 4 MG/1
TABLET ORAL
Qty: 1 KIT | Refills: 0 | Status: SHIPPED | OUTPATIENT
Start: 2022-10-20 | End: 2022-10-26

## 2022-10-20 NOTE — PATIENT INSTRUCTIONS
Cough congestion acute sinusitis  Levaquin 500 daily for 7 days  Medrol Dosepak as directed  Mucinex 1200 twice a day for 7 days

## 2022-10-20 NOTE — PROGRESS NOTES
10/20/2022    TELEHEALTH EVALUATION -- Audio/Visual (During [facility] public health emergency)    HPI:    Mahsa Bennett (:  1945) has requested an audio/video evaluation for the following concern(s):     Sore throat;   Congestion and sneezing      Review of Systems    Prior to Visit Medications    Medication Sig Taking? Authorizing Provider   levoFLOXacin (LEVAQUIN) 500 MG tablet Take 1 tablet by mouth daily for 7 days Yes ALLYN Saxena   methylPREDNISolone (MEDROL DOSEPACK) 4 MG tablet Take by mouth. Yes ALLYN Saxena   ofloxacin (OCUFLOX) 0.3 % solution 4 drops to the right ear twice a day x10 days  Razia Merino PA-C   fluticasone-salmeterol (ADVAIR DISKUS) 250-50 MCG/ACT AEPB diskus inhaler INHALE 1 PUFF BY MOUTH TWICE DAILY  ALLYN Saxena   clopidogrel (PLAVIX) 75 MG tablet TAKE 1 TABLET BY MOUTH EVERY DAY  ALLYN Saxena   vitamin D (ERGOCALCIFEROL) 1.25 MG (33100 UT) CAPS capsule TAKE 1 CAPSULE BY MOUTH 1 TIME A WEEK  ALLYN Saxena   celecoxib (CELEBREX) 100 MG capsule Take 1 capsule by mouth daily  ALLYN Saxena   lovastatin (MEVACOR) 20 MG tablet TAKE ONE TABLET BY MOUTH NIGHTLY  ALLYN Saxena   lisinopril (PRINIVIL;ZESTRIL) 2.5 MG tablet Take 1 tablet by mouth daily  ALLYN Saxena   azelastine (ASTELIN) 0.1 % nasal spray 1 spray by Nasal route 2 times daily Use in each nostril as directed  ALLYN Saxena   fluticasone (FLONASE) 50 MCG/ACT nasal spray 2 sprays by Nasal route daily  ALLYN Saxena   nitroGLYCERIN (NITROSTAT) 0.4 MG SL tablet Place 1 tablet under the tongue every 5 minutes as needed for Chest pain up to max of 3 total doses. If no relief after 1 dose, call 911.   ALLYN Saxena   gabapentin (NEURONTIN) 300 MG capsule TAKE 1 CAPSULE BY MOUTH EVERY 8 HOURS AS NEEDED  Historical Provider, MD   Cyanocobalamin (VITAMIN B 12 PO) Take 1 tablet by mouth daily  Historical Provider, MD   aspirin 81 MG EC tablet Take 81 mg by mouth daily  Historical Provider, MD   oxyCODONE-acetaminophen (PERCOCET) 7.5-325 MG per tablet Take 1 tablet by mouth 2 times daily as needed. Historical Provider, MD   ibuprofen (ADVIL;MOTRIN) 800 MG tablet Take 1 tablet by mouth 2 times daily as needed for Pain  Stephanie Marie MD       Social History     Tobacco Use    Smoking status: Every Day     Packs/day: 1.00     Years: 30.00     Pack years: 30.00     Types: Cigarettes    Smokeless tobacco: Never   Substance Use Topics    Alcohol use: No            PHYSICAL EXAMINATION:  [ INSTRUCTIONS:  \"[x]\" Indicates a positive item  \"[]\" Indicates a negative item  -- DELETE ALL ITEMS NOT EXAMINED]  Vital Signs: (As obtained by patient/caregiver or practitioner observation)    Blood pressure-  Heart rate-    Respiratory rate-    Temperature-  Pulse oximetry-     Constitutional: [x] Appears well-developed and well-nourished [x] No apparent distress      [] Abnormal-   Mental status  [] Alert and awake  [] Oriented to person/place/time []Able to follow commands      Eyes:  EOM    [x]  Normal  [] Abnormal-  Sclera  [x]  Normal  [] Abnormal -         Discharge []  None visible  [] Abnormal -    HENT:   [x] Normocephalic, atraumatic.   [] Abnormal   [] Mouth/Throat: Mucous membranes are moist.     External Ears [] Normal  [] Abnormal-     Neck: [] No visualized mass     Pulmonary/Chest: [x] Respiratory effort normal.  [] No visualized signs of difficulty breathing or respiratory distress        [] Abnormal-      Musculoskeletal:   [] Normal gait with no signs of ataxia         [] Normal range of motion of neck        [] Abnormal-       Neurological:        [x] No Facial Asymmetry (Cranial nerve 7 motor function) (limited exam to video visit)          [] No gaze palsy        [] Abnormal-         Skin:        [] No significant exanthematous lesions or discoloration noted on facial skin         [] Abnormal-            Psychiatric:       [] Normal Affect [] No Hallucinations        [] Abnormal-     Other pertinent observable physical exam findings-     ASSESSMENT/PLAN:  1. Bronchitis  Medrol Dosepak and Levaquin with Mucinex    No follow-ups on file. Uday Muller, was evaluated through a synchronous (real-time) audio-video encounter. The patient (or guardian if applicable) is aware that this is a billable service, which includes applicable co-pays. This Virtual Visit was conducted with patient's (and/or legal guardian's) consent. The visit was conducted pursuant to the emergency declaration under the [facility], [address] [place] [address] authority and the [employer] and [employer] General Act. Patient identification was verified, and a caregiver was present when appropriate. The patient was located at Home: [address] [place] [address]. Provider was located at [place] [id number]): [address] [place] [address]. Total time spent on this encounter: Not billed by time    --ALLYN Uriarte on 10/20/2022 at 11:42 AM    An electronic signature was used to authenticate this note.

## 2022-11-21 RX ORDER — FLUTICASONE PROPIONATE 50 MCG
SPRAY, SUSPENSION (ML) NASAL
Qty: 16 G | Refills: 5 | Status: SHIPPED | OUTPATIENT
Start: 2022-11-21

## 2022-11-21 NOTE — TELEPHONE ENCOUNTER
Godfrey Jc called requesting a refill of the below medication which has been pended for you:     Requested Prescriptions     Pending Prescriptions Disp Refills    fluticasone (FLONASE) 50 MCG/ACT nasal spray [Pharmacy Med Name: FLUTICASONE 50MCG NASAL SP (120) RX] 16 g 5     Sig: SHAKE LIQUID AND USE 2 SPRAYS IN EACH NOSTRIL DAILY       Last Appointment Date: 10/20/2022  Next Appointment Date: 12/13/2022    No Known Allergies

## 2022-12-01 DIAGNOSIS — E55.9 VITAMIN D DEFICIENCY: ICD-10-CM

## 2022-12-01 DIAGNOSIS — I10 HTN (HYPERTENSION), BENIGN: ICD-10-CM

## 2022-12-01 DIAGNOSIS — E78.00 PURE HYPERCHOLESTEROLEMIA: ICD-10-CM

## 2022-12-01 LAB
ALBUMIN SERPL-MCNC: 5 G/DL (ref 3.5–5.2)
ALP BLD-CCNC: 77 U/L (ref 40–130)
ALT SERPL-CCNC: 18 U/L (ref 5–41)
ANION GAP SERPL CALCULATED.3IONS-SCNC: 11 MMOL/L (ref 7–19)
AST SERPL-CCNC: 17 U/L (ref 5–40)
BASOPHILS ABSOLUTE: 0 K/UL (ref 0–0.2)
BASOPHILS RELATIVE PERCENT: 0.7 % (ref 0–1)
BILIRUB SERPL-MCNC: 1.6 MG/DL (ref 0.2–1.2)
BILIRUBIN URINE: NEGATIVE
BLOOD, URINE: NEGATIVE
BUN BLDV-MCNC: 21 MG/DL (ref 8–23)
CALCIUM SERPL-MCNC: 10.2 MG/DL (ref 8.8–10.2)
CHLORIDE BLD-SCNC: 105 MMOL/L (ref 98–111)
CHOLESTEROL, TOTAL: 129 MG/DL (ref 160–199)
CLARITY: CLEAR
CO2: 26 MMOL/L (ref 22–29)
COLOR: YELLOW
CREAT SERPL-MCNC: 1 MG/DL (ref 0.5–1.2)
EOSINOPHILS ABSOLUTE: 0.1 K/UL (ref 0–0.6)
EOSINOPHILS RELATIVE PERCENT: 0.9 % (ref 0–5)
GFR SERPL CREATININE-BSD FRML MDRD: >60 ML/MIN/{1.73_M2}
GLUCOSE BLD-MCNC: 88 MG/DL (ref 74–109)
GLUCOSE URINE: NEGATIVE MG/DL
HCT VFR BLD CALC: 48.9 % (ref 42–52)
HDLC SERPL-MCNC: 45 MG/DL (ref 55–121)
HEMOGLOBIN: 15.4 G/DL (ref 14–18)
IMMATURE GRANULOCYTES #: 0 K/UL
KETONES, URINE: NEGATIVE MG/DL
LDL CHOLESTEROL CALCULATED: 59 MG/DL
LEUKOCYTE ESTERASE, URINE: NEGATIVE
LYMPHOCYTES ABSOLUTE: 1.5 K/UL (ref 1.1–4.5)
LYMPHOCYTES RELATIVE PERCENT: 26.7 % (ref 20–40)
MCH RBC QN AUTO: 30.3 PG (ref 27–31)
MCHC RBC AUTO-ENTMCNC: 31.5 G/DL (ref 33–37)
MCV RBC AUTO: 96.3 FL (ref 80–94)
MONOCYTES ABSOLUTE: 0.4 K/UL (ref 0–0.9)
MONOCYTES RELATIVE PERCENT: 6.4 % (ref 0–10)
NEUTROPHILS ABSOLUTE: 3.7 K/UL (ref 1.5–7.5)
NEUTROPHILS RELATIVE PERCENT: 65.1 % (ref 50–65)
NITRITE, URINE: NEGATIVE
PDW BLD-RTO: 13 % (ref 11.5–14.5)
PH UA: 6 (ref 5–8)
PLATELET # BLD: 235 K/UL (ref 130–400)
PMV BLD AUTO: 9.9 FL (ref 9.4–12.4)
POTASSIUM SERPL-SCNC: 4.5 MMOL/L (ref 3.5–5)
PROTEIN UA: NEGATIVE MG/DL
RBC # BLD: 5.08 M/UL (ref 4.7–6.1)
SODIUM BLD-SCNC: 142 MMOL/L (ref 136–145)
SPECIFIC GRAVITY UA: 1.02 (ref 1–1.03)
TOTAL PROTEIN: 7.3 G/DL (ref 6.6–8.7)
TRIGL SERPL-MCNC: 123 MG/DL (ref 0–149)
TSH SERPL DL<=0.05 MIU/L-ACNC: 2.14 UIU/ML (ref 0.27–4.2)
UROBILINOGEN, URINE: 1 E.U./DL
VITAMIN D 25-HYDROXY: 64.4 NG/ML
WBC # BLD: 5.7 K/UL (ref 4.8–10.8)

## 2022-12-13 ENCOUNTER — OFFICE VISIT (OUTPATIENT)
Dept: INTERNAL MEDICINE | Age: 77
End: 2022-12-13
Payer: MEDICARE

## 2022-12-13 VITALS
SYSTOLIC BLOOD PRESSURE: 126 MMHG | TEMPERATURE: 97.1 F | WEIGHT: 206 LBS | HEART RATE: 65 BPM | DIASTOLIC BLOOD PRESSURE: 74 MMHG | BODY MASS INDEX: 27.9 KG/M2 | OXYGEN SATURATION: 98 % | HEIGHT: 72 IN

## 2022-12-13 DIAGNOSIS — Z00.00 MEDICARE ANNUAL WELLNESS VISIT, SUBSEQUENT: Primary | ICD-10-CM

## 2022-12-13 DIAGNOSIS — Z12.5 ENCOUNTER FOR PROSTATE CANCER SCREENING: ICD-10-CM

## 2022-12-13 DIAGNOSIS — J44.9 CHRONIC OBSTRUCTIVE PULMONARY DISEASE, UNSPECIFIED COPD TYPE (HCC): ICD-10-CM

## 2022-12-13 DIAGNOSIS — I25.10 CHRONIC CORONARY ARTERY DISEASE: ICD-10-CM

## 2022-12-13 DIAGNOSIS — I10 HTN (HYPERTENSION), BENIGN: ICD-10-CM

## 2022-12-13 DIAGNOSIS — R25.2 LEG CRAMPS: ICD-10-CM

## 2022-12-13 DIAGNOSIS — G89.4 CHRONIC PAIN SYNDROME: ICD-10-CM

## 2022-12-13 DIAGNOSIS — E78.00 PURE HYPERCHOLESTEROLEMIA: ICD-10-CM

## 2022-12-13 DIAGNOSIS — E55.9 VITAMIN D DEFICIENCY: ICD-10-CM

## 2022-12-13 DIAGNOSIS — K21.9 GASTROESOPHAGEAL REFLUX DISEASE WITHOUT ESOPHAGITIS: ICD-10-CM

## 2022-12-13 PROCEDURE — G8484 FLU IMMUNIZE NO ADMIN: HCPCS | Performed by: NURSE PRACTITIONER

## 2022-12-13 PROCEDURE — 3078F DIAST BP <80 MM HG: CPT | Performed by: NURSE PRACTITIONER

## 2022-12-13 PROCEDURE — 3074F SYST BP LT 130 MM HG: CPT | Performed by: NURSE PRACTITIONER

## 2022-12-13 PROCEDURE — 1123F ACP DISCUSS/DSCN MKR DOCD: CPT | Performed by: NURSE PRACTITIONER

## 2022-12-13 PROCEDURE — G0439 PPPS, SUBSEQ VISIT: HCPCS | Performed by: NURSE PRACTITIONER

## 2022-12-13 SDOH — ECONOMIC STABILITY: FOOD INSECURITY: WITHIN THE PAST 12 MONTHS, YOU WORRIED THAT YOUR FOOD WOULD RUN OUT BEFORE YOU GOT MONEY TO BUY MORE.: NEVER TRUE

## 2022-12-13 SDOH — ECONOMIC STABILITY: FOOD INSECURITY: WITHIN THE PAST 12 MONTHS, THE FOOD YOU BOUGHT JUST DIDN'T LAST AND YOU DIDN'T HAVE MONEY TO GET MORE.: NEVER TRUE

## 2022-12-13 ASSESSMENT — PATIENT HEALTH QUESTIONNAIRE - PHQ9
2. FEELING DOWN, DEPRESSED OR HOPELESS: 0
1. LITTLE INTEREST OR PLEASURE IN DOING THINGS: 0
SUM OF ALL RESPONSES TO PHQ QUESTIONS 1-9: 0
SUM OF ALL RESPONSES TO PHQ9 QUESTIONS 1 & 2: 0
SUM OF ALL RESPONSES TO PHQ QUESTIONS 1-9: 0

## 2022-12-13 ASSESSMENT — SOCIAL DETERMINANTS OF HEALTH (SDOH): HOW HARD IS IT FOR YOU TO PAY FOR THE VERY BASICS LIKE FOOD, HOUSING, MEDICAL CARE, AND HEATING?: NOT HARD AT ALL

## 2022-12-13 ASSESSMENT — LIFESTYLE VARIABLES
HOW OFTEN DO YOU HAVE A DRINK CONTAINING ALCOHOL: NEVER
HOW MANY STANDARD DRINKS CONTAINING ALCOHOL DO YOU HAVE ON A TYPICAL DAY: PATIENT DOES NOT DRINK

## 2022-12-13 NOTE — PROGRESS NOTES
Medicare Annual Wellness Visit    Evaristo Galeano is here for Medicare AWV    Assessment & Plan       Medicare annual wellness visit, subsequent  Chronic coronary artery disease  Assessment & Plan:  Stable with stent and Plavix     HTN (hypertension), benign  Assessment & Plan:  Stable with Zestril five 2.5     Orders:  -     CBC with Auto Differential; Future  -     Comprehensive Metabolic Panel; Future  -     Urinalysis with Reflex to Culture; Future  -     TSH; Future  Chronic obstructive pulmonary disease, unspecified COPD type ([address]  Assessment & Plan:  Stable and controlled continues to smoke   Gastroesophageal reflux disease without esophagitis  Assessment & Plan:  Occasionally uses Tums   Chronic pain syndrome  Assessment & Plan:  He goes to pain management with gabapentin   Pure hypercholesterolemia  Assessment & Plan:  Stable with lovastatin 20 cholesterol 129 triglycerides 123   Orders:  -     Lipid Panel; Future  Vitamin D deficiency  Assessment & Plan:  Stable with 50,000 units weekly level 64   Orders:  -     Vitamin D 25 Hydroxy; Future  Leg cramps  Assessment & Plan:  Continue with potassium and magnesium   Encounter for prostate cancer screening  -     PSA Screening; Future    Recommendations for Preventive Services Due: see orders and patient instructions/AVS.  Recommended screening schedule for the next 5-10 years is provided to the patient in written form: see Patient Instructions/AVS.     Return in 6 months (on 6/13/2023) for have labs done prior to appt.      Subjective      CAD stable he has a stent and is on Plavix  Hypertension with Zestril 2.5  COPD continue to smoke  GERD uses Tums as needed  Osteoarthritis;  stable with clebrex 100 daily  helps back pain   Hyperlipidemia he is stable with lovastatin 20 cholesterol is 129 triglycerides are 123  Vitamin D deficiency stable with 50,000 units weekly level 64.4  Leg cramps he has been using potassium and magnesium over-the-counter and they work for him so we will just leave it with that      Patient's complete Health Risk Assessment and screening values have been reviewed and are found in Flowsheets. The following problems were reviewed today and where indicated follow up appointments were made and/or referrals ordered. Positive Risk Factor Screenings with Interventions:                Opioid Risk: (Low risk score <55) Opioid risk score: 21    Patient is low risk for opioid use disorder or overdose. Last PDMP Esteban as Reviewed:  Review User Review Instant Review Result                  Weight and Activity:  Physical Activity: Sufficiently Active    Days of Exercise per Week: 7 days    Minutes of Exercise per Session: 60 min     On average, how many days per week do you engage in moderate to strenuous exercise (like a brisk walk)?: 7 days  Have you lost any weight without trying in the past 3 months?: No  Body mass index: (!) 27.94      Dentist Screen:  Have you seen the dentist within the past year?: (!) No    Intervention:  Patient declines any further evaluation or treatment     Vision Screen:  Do you have difficulty driving, watching TV, or doing any of your daily activities because of your eyesight?: No  Have you had an eye exam within the past year?: (!) No  No results found.     Interventions:   Patient encouraged to make appointment with their eye specialist  See AVS for additional education material  See A/P for any pertinent orders       Tobacco Use:  Tobacco Use: High Risk    Smoking Tobacco Use: Every Day    Smokeless Tobacco Use: Never    Passive Exposure: Not on file     E-cigarette/Vaping       Questions Responses    E-cigarette/Vaping Use     Start Date     Passive Exposure     Quit Date     Counseling Given     Comments         Interventions:  Patient declined any further intervention or treatment                        Objective   Vitals:    12/13/22 0955   BP: 126/74   Pulse: 65   Temp: 97.1 °F (36.2 °C)   SpO2: 98%   Weight: 206 lb (93.4 kg)   Height: 6' (1.829 m)      Body mass index is 27.94 kg/m². General Appearance: alert and oriented to person, place and time, well developed and well- nourished, in no acute distress  Skin: warm and dry, no rash or erythema  Head: normocephalic and atraumatic  Pulmonary/Chest: clear to auscultation bilaterally- no wheezes, rales or rhonchi, normal air movement, no respiratory distress  Cardiovascular: normal rate, regular rhythm, normal S1 and S2, no murmurs, rubs, clicks, or gallops, distal pulses intact, no carotid bruits  Abdomen: soft, non-tender, non-distended, normal bowel sounds, no masses or organomegaly  Extremities: no cyanosis, clubbing or edema  Musculoskeletal: normal range of motion, no joint swelling, deformity or tenderness  Neurologic: reflexes normal and symmetric, no cranial nerve deficit, gait, coordination and speech normal       No Known Allergies  Prior to Visit Medications    Medication Sig Taking?  Authorizing Provider   fluticasone (FLONASE) 50 MCG/ACT nasal spray SHAKE LIQUID AND USE 2 SPRAYS IN EACH NOSTRIL DAILY Yes ALLYN Armijo   ofloxacin (OCUFLOX) 0.3 % solution 4 drops to the right ear twice a day x10 days Yes Zacarias Ochoa PA-C   fluticasone-salmeterol (ADVAIR DISKUS) 250-50 MCG/ACT AEPB diskus inhaler INHALE 1 PUFF BY MOUTH TWICE DAILY Yes ALLYN Armijo   clopidogrel (PLAVIX) 75 MG tablet TAKE 1 TABLET BY MOUTH EVERY DAY Yes ALLYN Armijo   vitamin D (ERGOCALCIFEROL) 1.25 MG (03854 UT) CAPS capsule TAKE 1 CAPSULE BY MOUTH 1 TIME A WEEK Yes ALLYN Armijo   celecoxib (CELEBREX) 100 MG capsule Take 1 capsule by mouth daily Yes ALLYN Armijo   lovastatin (MEVACOR) 20 MG tablet TAKE ONE TABLET BY MOUTH NIGHTLY Yes ALLYN Armijo   lisinopril (PRINIVIL;ZESTRIL) 2.5 MG tablet Take 1 tablet by mouth daily Yes ALLYN Armijo   azelastine (ASTELIN) 0.1 % nasal spray 1 spray by Nasal route 2 times daily Use in each nostril as directed Yes ALLYN Jeffrey   nitroGLYCERIN (NITROSTAT) 0.4 MG SL tablet Place 1 tablet under the tongue every 5 minutes as needed for Chest pain up to max of 3 total doses. If no relief after 1 dose, call 911. Yes ALLYN Jeffrey   gabapentin (NEURONTIN) 300 MG capsule TAKE 1 CAPSULE BY MOUTH EVERY 8 HOURS AS NEEDED Yes Historical Provider, MD   Cyanocobalamin (VITAMIN B 12 PO) Take 1 tablet by mouth daily Yes Historical Provider, MD   aspirin 81 MG EC tablet Take 81 mg by mouth daily Yes Historical Provider, MD   oxyCODONE-acetaminophen (PERCOCET) 7.5-325 MG per tablet Take 1 tablet by mouth 2 times daily as needed. Yes Historical Provider, MD   ibuprofen (ADVIL;MOTRIN) 800 MG tablet Take 1 tablet by mouth 2 times daily as needed for Pain Yes Katelin Cornell MD       CareTe (Including outside providers/suppliers regularly involved in providing care):   Patient Care Team:  ALLYN Jeffrey as PCP - General (Nurse Practitioner Acute Care)  ALLYN Jeffrey as PCP - [facility] Empaneled Provider     Reviewed and updated this visit:  Allergies  Meds       Coronary disease stable no changes   2 hypertension stable with current meds no changes  3. COPD stable he would benefit from decreasing his cigarettes  4. GERD stable with times  5. Osteoarthritis stable Celebrex  6. Hyperlipidemia stable on current dose of lovastatin no changes  7. Vitamin D deficiency continue usual dose  8.   Right leg cramps it is okay to go ahead and use the potassium and magnesium

## 2022-12-13 NOTE — PATIENT INSTRUCTIONS
Coronary disease stable no changes   2 hypertension stable with current meds no changes  3. COPD stable he would benefit from decreasing his cigarettes  4. GERD stable with times  5. Osteoarthritis stable Celebrex  6. Hyperlipidemia stable on current dose of lovastatin no changes  7. Vitamin D deficiency continue usual dose  8. Right leg cramps it is okay to go ahead and use the potassium and magnesium       Learning About Dental Care for Older Adults  Dental care for older adults: Overview  Dental care for older people is much the same as for younger adults. But older adults do have concerns that younger adults do not. Older adults may have problems with gum disease and decay on the roots of their teeth. They may need missing teeth replaced or broken fillings fixed. Or they may have dentures that need to be cared for. Some older adults may have trouble holding a toothbrush. You can help remind the person you are caring for to brush and floss their teeth or to clean their dentures. In some cases, you may need to do the brushing and other dental care tasks. People who have trouble using their hands or who have dementia may need this extra help. How can you help with dental care? Normal dental care  To keep the teeth and gums healthy:  Brush the teeth with fluoride toothpaste twice a day--in the morning and at night--and floss at least once a day. Plaque can quickly build up on the teeth of older adults. Watch for the signs of gum disease. These signs include gums that bleed after brushing or after eating hard foods, such as apples. See a dentist regularly. Many experts recommend checkups every 6 months. Keep the dentist up to date on any new medications the person is taking. Encourage a balanced diet that includes whole grains, vegetables, and fruits, and that is low in saturated fat and sodium. Encourage the person you're caring for not to use tobacco products.  They can affect dental and general health. Many older adults have a fixed income and feel that they can't afford dental care. But most towns and cities have programs in which dentists help older adults by lowering fees. Contact your area's public health offices or  for information about dental care in your area. Using a toothbrush  Older adults with arthritis sometimes have trouble brushing their teeth because they can't easily hold the toothbrush. Their hands and fingers may be stiff, painful, or weak. If this is the case, you can: Offer an electric toothbrush. Enlarge the handle of a non-electric toothbrush by wrapping a sponge, an elastic bandage, or adhesive tape around it. Push the toothbrush handle through a ball made of rubber or soft foam.  Make the handle longer and thicker by taping Popsicle sticks or tongue depressors to it. You may also be able to buy special toothbrushes, toothpaste dispensers, and floss holders. Your doctor may recommend a soft-bristle toothbrush if the person you care for bleeds easily. Bleeding can happen because of a health problem or from certain medicines. A toothpaste for sensitive teeth may help if the person you care for has sensitive teeth. How do you brush and floss someone's teeth? If the person you are caring for has a hard time cleaning their teeth on their own, you may need to brush and floss their teeth for them. It may be easiest to have the person sit and face away from you, and to sit or stand behind them. That way you can steady their head against your arm as you reach around to floss and brush their teeth. Choose a place that has good lighting and is comfortable for both of you. Before you begin, gather your supplies. You will need gloves, floss, a toothbrush, and a container to hold water if you are not near a sink. Wash and dry your hands well and put on gloves. Start by flossing:  Gently work a piece of floss between each of the teeth toward the gums.  A plastic flossing tool may make this easier, and they are available at most [facility]. Curve the floss around each tooth into a U-shape and gently slide it under the gum line. Move the floss firmly up and down several times to scrape off the plaque. After you've finished flossing, throw away the used floss and begin brushing:  Wet the brush and apply toothpaste. Place the brush at a 45-degree angle where the teeth meet the gums. Press firmly, and move the brush in small circles over the surface of the teeth. Be careful not to brush too hard. Vigorous brushing can make the gums pull away from the teeth and can scratch the tooth enamel. Brush all surfaces of the teeth, on the tongue side and on the cheek side. Pay special attention to the front teeth and all surfaces of the back teeth. Brush chewing surfaces with short back-and-forth strokes. After you've finished, help the person rinse the remaining toothpaste from their mouth. Where can you learn more? Go to http://www.woods.com/ and enter F944 to learn more about \"Learning About Dental Care for Older Adults. \"  Current as of: June 16, 2022               Content Version: 13.5  © 4775-5079 Healthwise, Incorporated. Care instructions adapted under license by [facility] ([facility]). If you have questions about a medical condition or this instruction, always ask your healthcare professional. Fran Irvin any warranty or liability for your use of this information. Learning About Vision Tests  What are vision tests? The four most common vision tests are visual acuity tests, refraction, visual field tests, and color vision tests. Visual acuity (sharpness) tests  These tests are used: To see if you need glasses or contact lenses. To monitor an eye problem. To check an eye injury. Visual acuity tests are done as part of routine exams.  You may also have this test when you get your 's license or apply for some types of jobs.  Visual field tests  These tests are used: To check for vision loss in any area of your range of vision. To screen for certain eye diseases. To look for nerve damage after a stroke, head injury, or other problem that could reduce blood flow to the brain. Refraction and color tests  A refraction test is done to find the right prescription for glasses and contact lenses. A color vision test is done to check for color blindness. Color vision is often tested as part of a routine exam. You may also have this test when you apply for a job where recognizing different colors is important, such as , electronics, or the [employer]. How are vision tests done? Visual acuity test   You cover one eye at a time. You read aloud from a wall chart across the room. You read aloud from a small card that you hold in your hand. Refraction   You look into a special device. The device puts lenses of different strengths in front of each eye to see how strong your glasses or contact lenses need to be. Visual field tests   Your doctor may have you look through special machines. Or your doctor may simply have you stare straight ahead while they move a finger into and out of your field of vision. Color vision test   You look at pieces of printed test patterns in various colors. You say what number or symbol you see. Your doctor may have you trace the number or symbol using a pointer. How do these tests feel? There is very little chance of having a problem from this test. If dilating drops are used for a vision test, they may make the eyes sting and cause a medicine taste in the mouth. Follow-up care is a key part of your treatment and safety. Be sure to make and go to all appointments, and call your doctor if you are having problems. It's also a good idea to know your test results and keep a list of the medicines you take. Where can you learn more?   Go to http://www.alvarado.com/ and enter T845 to learn more about \"Learning About Vision Tests. \"  Current as of: October 12, 2022               Content Version: 13.5  © 2006-2022 [employer]. Care instructions adapted under license by [facility] ([facility]). If you have questions about a medical condition or this instruction, always ask your healthcare professional. [address] any warranty or liability for your use of this information. Advance Directives: Care Instructions  Overview  An advance directive is a legal way to state your wishes at the end of your life. It tells your family and your doctor what to do if you can't say what you want. There are two main types of advance directives. You can change them any time your wishes change. Living will. This form tells your family and your doctor your wishes about life support and other treatment. The form is also called a declaration. Medical power of . This form lets you name a person to make treatment decisions for you when you can't speak for yourself. This person is called a health care agent (health care proxy, health care surrogate). The form is also called a durable power of  for health care. If you do not have an advance directive, decisions about your medical care may be made by a family member, or by a doctor or a  who doesn't know you. It may help to think of an advance directive as a gift to the people who care for you. If you have one, they won't have to make tough decisions by themselves. For more information, including forms for your state, see the [address] [facility] website (www.caringinfo.org/planning/advance-directives/). Follow-up care is a key part of your treatment and safety. Be sure to make and go to all appointments, and call your doctor if you are having problems. It's also a good idea to know your test results and keep a list of the medicines you take. What should you include in an advance directive?   Many states have a unique advance directive form. (It may ask you to address specific issues.) Or you might use a universal form that's approved by many states. If your form doesn't tell you what to address, it may be hard to know what to include in your advance directive. Use the questions below to help you get started. Who do you want to make decisions about your medical care if you are not able to? What life-support measures do you want if you have a serious illness that gets worse over time or can't be cured? What are you most afraid of that might happen? (Maybe you're afraid of having pain, losing your independence, or being kept alive by machines.)  Where would you prefer to die? (Your home? A hospital? A nursing home?)  Do you want to donate your organs when you die? Do you want certain [employer] practices performed before you die? When should you call for help? Be sure to contact your doctor if you have any questions. Where can you learn more? Go to http://www.alvarado.com/ and enter R264 to learn more about \"Advance Directives: Care Instructions. \"  Current as of: June 16, 2022               Content Version: 13.5  © 0037-7721 Healthwise, Incorporated. Care instructions adapted under license by [facility] [address] If you have questions about a medical condition or this instruction, always ask your healthcare professional. [address] any warranty or liability for your use of this information. Personalized Preventive Plan for Matilde Keene - 12/13/2022  Medicare offers a range of preventive health benefits. Some of the tests and screenings are paid in full while other may be subject to a deductible, co-insurance, and/or copay. Some of these benefits include a comprehensive review of your medical history including lifestyle, illnesses that may run in your family, and various assessments and screenings as appropriate.     After reviewing your medical record and screening and assessments performed today your provider may have ordered immunizations, labs, imaging, and/or referrals for you. A list of these orders (if applicable) as well as your Preventive Care list are included within your After Visit Summary for your review. Other Preventive Recommendations:    A preventive eye exam performed by an eye specialist is recommended every 1-2 years to screen for glaucoma; cataracts, macular degeneration, and other eye disorders. A preventive dental visit is recommended every 6 months. Try to get at least 150 minutes of exercise per week or 10,000 steps per day on a pedometer . Order or download the FREE \"Exercise & Physical Activity: Your Everyday Guide\" from [employer]. Call 3-908.240.7031 or search [employer] online. You need 9343-5422 mg of calcium and 2761-6630 IU of vitamin D per day. It is possible to meet your calcium requirement with diet alone, but a vitamin D supplement is usually necessary to meet this goal.  When exposed to the sun, use a sunscreen that protects against both UVA and UVB radiation with an SPF of 30 or greater. Reapply every 2 to 3 hours or after sweating, drying off with a towel, or swimming. Always wear a seat belt when traveling in a car. Always wear a helmet when riding a bicycle or motorcycle.

## 2022-12-27 RX ORDER — LISINOPRIL 2.5 MG/1
2.5 TABLET ORAL DAILY
Qty: 90 TABLET | Refills: 3 | Status: SHIPPED | OUTPATIENT
Start: 2022-12-27

## 2023-01-09 ENCOUNTER — TELEPHONE (OUTPATIENT)
Dept: ENT CLINIC | Age: 78
End: 2023-01-09

## 2023-01-09 NOTE — TELEPHONE ENCOUNTER
Patient's wife, Jermain Lees, called and said patient wants to schedule an appointment with dr Shyanne Marcum to discuss his hearing and getting cochlear implants. Patient currently sees raymond brian. Please return Sigrid's call @ [phone].      Thank you

## 2023-01-12 ENCOUNTER — OFFICE VISIT (OUTPATIENT)
Dept: ENT CLINIC | Age: 78
End: 2023-01-12
Payer: MEDICARE

## 2023-01-12 VITALS — BODY MASS INDEX: 28.07 KG/M2 | SYSTOLIC BLOOD PRESSURE: 136 MMHG | WEIGHT: 207 LBS | DIASTOLIC BLOOD PRESSURE: 66 MMHG

## 2023-01-12 DIAGNOSIS — H61.23 BILATERAL IMPACTED CERUMEN: Primary | ICD-10-CM

## 2023-01-12 PROCEDURE — 1123F ACP DISCUSS/DSCN MKR DOCD: CPT | Performed by: PHYSICIAN ASSISTANT

## 2023-01-12 PROCEDURE — G8484 FLU IMMUNIZE NO ADMIN: HCPCS | Performed by: PHYSICIAN ASSISTANT

## 2023-01-12 PROCEDURE — G8427 DOCREV CUR MEDS BY ELIG CLIN: HCPCS | Performed by: PHYSICIAN ASSISTANT

## 2023-01-12 PROCEDURE — 3078F DIAST BP <80 MM HG: CPT | Performed by: PHYSICIAN ASSISTANT

## 2023-01-12 PROCEDURE — 3075F SYST BP GE 130 - 139MM HG: CPT | Performed by: PHYSICIAN ASSISTANT

## 2023-01-12 PROCEDURE — 69210 REMOVE IMPACTED EAR WAX UNI: CPT | Performed by: PHYSICIAN ASSISTANT

## 2023-01-12 PROCEDURE — G8417 CALC BMI ABV UP PARAM F/U: HCPCS | Performed by: PHYSICIAN ASSISTANT

## 2023-01-12 PROCEDURE — 4004F PT TOBACCO SCREEN RCVD TLK: CPT | Performed by: PHYSICIAN ASSISTANT

## 2023-01-12 NOTE — PROGRESS NOTES
Mr. Viky Mcbride is a pleasant 71-year-old  male that presents for a 3-month cerumen check. Patient has a history of having mastoid surgery to the left side years ago which has resulted in recurring cerumen impactions. Patient also reports that he has very little hearing from the left ear since his mastoidectomy. He currently wears hearing aids to the right ear and reports that his hearing aid has been acting up and he believes it is secondary to the wax. Physical examination with the microscope revealed the patient to have bilateral cerumen impactions. Both were removed with alligator graspers and suction with no complications. After the canal was disimpacted, the TMs appear to be normal with notice of infection or perforation. Neck exam demonstrated no lymphadenopathy or thyromegaly. Impression: Successful cerumen disimpaction bilaterally with history of mastoid surgery to the left ear    Plan: Patient is to follow-up in 3 months for cerumen check. We rediscussed the possibilities of the patient having a cochlear implant to the left ear. He reports he is still thinking about it but does not want to commit. Patient was reminded to call if he has any questions or problems.       Electronically signed by Jose Francois PA-C on 1/12/23 at 2:12 PM CST

## 2023-02-11 DIAGNOSIS — E78.00 PURE HYPERCHOLESTEROLEMIA: ICD-10-CM

## 2023-02-13 ENCOUNTER — OFFICE VISIT (OUTPATIENT)
Dept: ENT CLINIC | Age: 78
End: 2023-02-13

## 2023-02-13 VITALS
HEIGHT: 72 IN | SYSTOLIC BLOOD PRESSURE: 128 MMHG | WEIGHT: 209 LBS | DIASTOLIC BLOOD PRESSURE: 72 MMHG | BODY MASS INDEX: 28.31 KG/M2

## 2023-02-13 DIAGNOSIS — H66.90 EAR INFECTION: ICD-10-CM

## 2023-02-13 DIAGNOSIS — H90.3 SENSORINEURAL HEARING LOSS (SNHL) OF BOTH EARS: Primary | ICD-10-CM

## 2023-02-13 RX ORDER — LOVASTATIN 20 MG/1
TABLET ORAL
Qty: 90 TABLET | Refills: 1 | Status: SHIPPED | OUTPATIENT
Start: 2023-02-13

## 2023-02-13 ASSESSMENT — ENCOUNTER SYMPTOMS
RESPIRATORY NEGATIVE: 1
EYES NEGATIVE: 1
ALLERGIC/IMMUNOLOGIC NEGATIVE: 1
GASTROINTESTINAL NEGATIVE: 1

## 2023-02-13 NOTE — PROGRESS NOTES
2023    Allen1 Lonny MADRIGAL (:  1945) is a 65 y.o. male, Established patient, here for evaluation of the following chief complaint(s):  New Patient (Ears)      Vitals:    23 1139   BP: 128/72   Weight: 209 lb (94.8 kg)   Height: 6' (1.829 m)       Wt Readings from Last 3 Encounters:   23 209 lb (94.8 kg)   23 207 lb (93.9 kg)   22 206 lb (93.4 kg)       BP Readings from Last 3 Encounters:   23 128/72   23 136/66   22 126/74         SUBJECTIVE/OBJECTIVE:    New patient seen today for his years. About 15 years ago the patient had cholesteatoma surgery on the left. Since that time he had very low hearing on that side. Most recent hearing test shows profound hearing loss on that side. Right side has a mixed loss which is severe to profound. He wears a hearing on the right and chronically has infections on the side. Review of Systems   Constitutional: Negative. HENT:  Positive for hearing loss. Eyes: Negative. Respiratory: Negative. Cardiovascular: Negative. Gastrointestinal: Negative. Endocrine: Negative. Musculoskeletal: Negative. Skin: Negative. Allergic/Immunologic: Negative. Neurological: Negative. Hematological: Negative. Psychiatric/Behavioral: Negative. Physical Exam  Vitals reviewed. Constitutional:       Appearance: Normal appearance. He is normal weight. HENT:      Head: Normocephalic and atraumatic. Right Ear: Tympanic membrane, ear canal and external ear normal. Drainage present. Left Ear: Ear canal and external ear normal.      Ears:      Comments: Postsurgical changes left     Nose: Nose normal.      Mouth/Throat:      Mouth: Mucous membranes are moist.      Pharynx: Oropharynx is clear. Eyes:      Extraocular Movements: Extraocular movements intact. Pupils: Pupils are equal, round, and reactive to light. Cardiovascular:      Rate and Rhythm: Normal rate and regular rhythm. Pulmonary:      Effort: Pulmonary effort is normal.      Breath sounds: Normal breath sounds. Musculoskeletal:      Cervical back: Normal range of motion. Skin:     General: Skin is warm and dry. Neurological:      General: No focal deficit present. Mental Status: He is alert and oriented to person, place, and time. Psychiatric:         Mood and Affect: Mood normal.         Behavior: Behavior normal.      Procedure Note:    Otomicroscopy     An operating microscope was utilized to examine the left ear. Infectious debris noted culture taken and suctioned out. .       ASSESSMENT/PLAN:    1. Sensorineural hearing loss (SNHL) of both ears  2. Ear infection  Culture taken of the right ear today. I will call them once I have results back and change his antibiotics to something that might work and if no infection we will put him on something to control the dermatitis. See him back in about a month. We talked about cochlear implants and a referral down to [facility] and they will think about this. Return in about 4 weeks (around 3/13/2023). An electronic signature was used to authenticate this note. Cong Borrero MD       Please note that this chart was generated using dragon dictation software. Although every effort was made to ensure the accuracy of this automated transcription, some errors in transcription may have occurred.

## 2023-02-13 NOTE — TELEPHONE ENCOUNTER
Juanito Ceballos called requesting a refill of the below medication which has been pended for you:     Requested Prescriptions     Pending Prescriptions Disp Refills    lovastatin (MEVACOR) 20 MG tablet [Pharmacy Med Name: LOVASTATIN 20MG TABLETS] 90 tablet 1     Sig: TAKE 1 TABLET BY MOUTH EVERY NIGHT       Last Appointment Date: 12/13/2022  Next Appointment Date: 6/13/2023    No Known Allergies

## 2023-02-14 ENCOUNTER — OFFICE VISIT (OUTPATIENT)
Dept: INTERNAL MEDICINE | Age: 78
End: 2023-02-14
Payer: MEDICARE

## 2023-02-14 ENCOUNTER — HOSPITAL ENCOUNTER (OUTPATIENT)
Dept: CT IMAGING | Age: 78
Discharge: HOME | End: 2023-02-14
Payer: MEDICARE

## 2023-02-14 VITALS
SYSTOLIC BLOOD PRESSURE: 122 MMHG | BODY MASS INDEX: 27.94 KG/M2 | DIASTOLIC BLOOD PRESSURE: 68 MMHG | HEART RATE: 78 BPM | TEMPERATURE: 97.4 F | OXYGEN SATURATION: 96 % | WEIGHT: 206 LBS

## 2023-02-14 DIAGNOSIS — R41.9 COGNITIVE COMPLAINTS: ICD-10-CM

## 2023-02-14 DIAGNOSIS — R41.3 MEMORY LOSS: ICD-10-CM

## 2023-02-14 DIAGNOSIS — R41.0 CONFUSION: ICD-10-CM

## 2023-02-14 DIAGNOSIS — R41.0 CONFUSION: Primary | ICD-10-CM

## 2023-02-14 PROCEDURE — 99214 OFFICE O/P EST MOD 30 MIN: CPT | Performed by: NURSE PRACTITIONER

## 2023-02-14 PROCEDURE — 70470 CT HEAD/BRAIN W/O & W/DYE: CPT

## 2023-02-14 PROCEDURE — G8484 FLU IMMUNIZE NO ADMIN: HCPCS | Performed by: NURSE PRACTITIONER

## 2023-02-14 PROCEDURE — G8427 DOCREV CUR MEDS BY ELIG CLIN: HCPCS | Performed by: NURSE PRACTITIONER

## 2023-02-14 PROCEDURE — 3074F SYST BP LT 130 MM HG: CPT | Performed by: NURSE PRACTITIONER

## 2023-02-14 PROCEDURE — 1123F ACP DISCUSS/DSCN MKR DOCD: CPT | Performed by: NURSE PRACTITIONER

## 2023-02-14 PROCEDURE — G8417 CALC BMI ABV UP PARAM F/U: HCPCS | Performed by: NURSE PRACTITIONER

## 2023-02-14 PROCEDURE — 4004F PT TOBACCO SCREEN RCVD TLK: CPT | Performed by: NURSE PRACTITIONER

## 2023-02-14 PROCEDURE — 3078F DIAST BP <80 MM HG: CPT | Performed by: NURSE PRACTITIONER

## 2023-02-14 PROCEDURE — 6360000004 HC RX CONTRAST MEDICATION: Performed by: NURSE PRACTITIONER

## 2023-02-14 RX ADMIN — IOPAMIDOL 60 ML: 755 INJECTION, SOLUTION INTRAVENOUS at 10:03

## 2023-02-14 SDOH — ECONOMIC STABILITY: HOUSING INSECURITY
IN THE LAST 12 MONTHS, WAS THERE A TIME WHEN YOU DID NOT HAVE A STEADY PLACE TO SLEEP OR SLEPT IN A SHELTER (INCLUDING NOW)?: NO

## 2023-02-14 SDOH — ECONOMIC STABILITY: INCOME INSECURITY: HOW HARD IS IT FOR YOU TO PAY FOR THE VERY BASICS LIKE FOOD, HOUSING, MEDICAL CARE, AND HEATING?: NOT HARD AT ALL

## 2023-02-14 SDOH — ECONOMIC STABILITY: FOOD INSECURITY: WITHIN THE PAST 12 MONTHS, THE FOOD YOU BOUGHT JUST DIDN'T LAST AND YOU DIDN'T HAVE MONEY TO GET MORE.: NEVER TRUE

## 2023-02-14 SDOH — ECONOMIC STABILITY: FOOD INSECURITY: WITHIN THE PAST 12 MONTHS, YOU WORRIED THAT YOUR FOOD WOULD RUN OUT BEFORE YOU GOT MONEY TO BUY MORE.: NEVER TRUE

## 2023-02-14 ASSESSMENT — ENCOUNTER SYMPTOMS
EYE ITCHING: 0
EYE DISCHARGE: 0
NAUSEA: 0
ABDOMINAL PAIN: 0
DIARRHEA: 0
SORE THROAT: 0
SHORTNESS OF BREATH: 0
BLOOD IN STOOL: 0
CHOKING: 0
VOMITING: 0
COLOR CHANGE: 0
TROUBLE SWALLOWING: 0
WHEEZING: 0
CONSTIPATION: 0
STRIDOR: 0
ABDOMINAL DISTENTION: 0
COUGH: 0

## 2023-02-14 ASSESSMENT — PATIENT HEALTH QUESTIONNAIRE - PHQ9
SUM OF ALL RESPONSES TO PHQ QUESTIONS 1-9: 0
SUM OF ALL RESPONSES TO PHQ QUESTIONS 1-9: 0
2. FEELING DOWN, DEPRESSED OR HOPELESS: 0
SUM OF ALL RESPONSES TO PHQ QUESTIONS 1-9: 0
SUM OF ALL RESPONSES TO PHQ QUESTIONS 1-9: 0
1. LITTLE INTEREST OR PLEASURE IN DOING THINGS: 0
SUM OF ALL RESPONSES TO PHQ9 QUESTIONS 1 & 2: 0

## 2023-02-14 NOTE — PATIENT INSTRUCTIONS
1.  Confusion we will get labs and a CT today  2. Memory loss CT and labs today if all these are normal he will certainly need to see neurology and most likely get an MRI as his this is concern for cerebral event.   We will also need to do carotid artery studies

## 2023-02-14 NOTE — PROGRESS NOTES
[facility]  [facility]  [address] [place] [address] DRIVE  [address]  [place] [address]  Dept: [phone]  Dept Fax: [phone]  Loc: [phone]    Luis A Keene (:  1945) is a 77 y.o. male,Established patient  with green, here for evaluation of the following chief complaint(s): Other (Pt is requaetuing ct scan he has had a lot of memory issues lately and that is abnormal for him )      Luis A Keene is a 77 y.o. male who presents today for his medical conditions/complaints as noted below.  Luis A Keene is c/robinson Other (Pt is requaetuing ct scan he has had a lot of memory issues lately and that is abnormal for him )        HPI:     Chief Complaint   Patient presents with    Other     Pt is requaetuing ct scan he has had a lot of memory issues lately and that is abnormal for him      HPI    Friday awoke with no real memory and he continues to have memory issues  he cane to the ear doctor yesterday and doesn't remember;   his wife said she is really concerned as  it seems he is different since Friday with short term  memory;      I spoke with Sheyla Calixto and she will see him on Friday  .  Her office will call the [facility] with an appointment  A Zio patch probably an MRI MRA of his head neck and ultrasound of his carotids.  He is already on Plavix  I called and gave this information to Sigrid Atwood's wife  Past Medical History:   Diagnosis Date    Adenomatous colon polyp     CAD (coronary artery disease)     LAD stent 3/2005 [facility]    COPD (chronic obstructive pulmonary disease) (HCC)     Hypertension     Male erectile dysfunction     Pure hypercholesterolemia     Sick sinus syndrome (HCC)     Vertigo       Past Surgical History:   Procedure Laterality Date    MASTOIDECTOMY      left ear 3/15 tympanomastoidectomy, Dr Lui, [facility]       Vitals 2023 2023 2023 2022 10/13/2022 2022   SYSTOLIC 122 128 136 126 126 122   DIASTOLIC 68 72 66 74 74  64   Pulse 78 - - 65 - -   Temp 97.4 - - 97.1 - -   Resp - - - - - -   SpO2 96 - - 98 - -   Weight 206 lb 209 lb 207 lb 206 lb 206 lb 200 lb   Height - 6' 0\" - 6' 0\" 6' 0\" 6' 0\"   Body mass index - 28.34 kg/m2 - 27.94 kg/m2 27.94 kg/m2 27.12 kg/m2   Some recent data might be hidden       Family History   Problem Relation Age of Onset    Breast Cancer Mother     Emphysema Father        Social History     Tobacco Use    Smoking status: Every Day     Packs/day: 1.00     Years: 30.00     Pack years: 30.00     Types: Cigarettes    Smokeless tobacco: Never   Substance Use Topics    Alcohol use: No      Current Outpatient Medications   Medication Sig Dispense Refill    lovastatin (MEVACOR) 20 MG tablet TAKE 1 TABLET BY MOUTH EVERY NIGHT 90 tablet 1    lisinopril (PRINIVIL;ZESTRIL) 2.5 MG tablet TAKE 1 TABLET BY MOUTH DAILY 90 tablet 3    fluticasone (FLONASE) 50 MCG/ACT nasal spray SHAKE LIQUID AND USE 2 SPRAYS IN EACH NOSTRIL DAILY 16 g 5    ofloxacin (OCUFLOX) 0.3 % solution 4 drops to the right ear twice a day x10 days 10 mL 0    fluticasone-salmeterol (ADVAIR DISKUS) 250-50 MCG/ACT AEPB diskus inhaler INHALE 1 PUFF BY MOUTH TWICE DAILY 60 each 2    clopidogrel (PLAVIX) 75 MG tablet TAKE 1 TABLET BY MOUTH EVERY DAY 90 tablet 1    vitamin D (ERGOCALCIFEROL) 1.25 MG (62114 UT) CAPS capsule TAKE 1 CAPSULE BY MOUTH 1 TIME A WEEK 12 capsule 1    celecoxib (CELEBREX) 100 MG capsule Take 1 capsule by mouth daily 60 capsule 3    azelastine (ASTELIN) 0.1 % nasal spray 1 spray by Nasal route 2 times daily Use in each nostril as directed 60 mL 5    nitroGLYCERIN (NITROSTAT) 0.4 MG SL tablet Place 1 tablet under the tongue every 5 minutes as needed for Chest pain up to max of 3 total doses.  If no relief after 1 dose, call 911. 25 tablet 1    gabapentin (NEURONTIN) 300 MG capsule TAKE 1 CAPSULE BY MOUTH EVERY 8 HOURS AS NEEDED  5    Cyanocobalamin (VITAMIN B 12 PO) Take 1 tablet by mouth daily      aspirin 81 MG EC tablet Take 81 mg by mouth daily      ibuprofen (ADVIL;MOTRIN) 800 MG tablet Take 1 tablet by mouth 2 times daily as needed for Pain 120 tablet 1    oxyCODONE-acetaminophen (PERCOCET) 7.5-325 MG per tablet Take 1 tablet by mouth 2 times daily as needed. (Patient not taking: Reported on 2/14/2023)       No current facility-administered medications for this visit.      No Known Allergies    Health Maintenance   Topic Date Due    Shingles vaccine (1 of 2) Never done    DTaP/Tdap/Td vaccine (1 - Tdap) 12/13/2023 (Originally 4/3/1964)    COVID-19 Vaccine (1) 12/24/2024 (Originally 1945)    Low dose CT lung screening  06/28/2023    Lipids  12/01/2023    Annual Wellness Visit (AWV)  12/14/2023    Depression Screen  02/14/2024    Colorectal Cancer Screen  01/22/2029    Flu vaccine  Completed    Pneumococcal 65+ years Vaccine  Completed    Hepatitis C screen  Completed    Hepatitis A vaccine  Aged Out    Hib vaccine  Aged Out    Meningococcal (ACWY) vaccine  Aged Out       No results found for: LABA1C  Lab Results   Component Value Date    PSA 1.37 06/08/2022    PSA 1.60 12/30/2020    PSA 1.71 12/30/2019     TSH   Date Value Ref Range Status   12/01/2022 2.140 0.270 - 4.200 uIU/mL Final   ]  Lab Results   Component Value Date     02/14/2023    K 4.3 02/14/2023     02/14/2023    CO2 26 02/14/2023    BUN 22 02/14/2023    CREATININE 0.9 02/14/2023    GLUCOSE 97 02/14/2023    CALCIUM 9.9 02/14/2023    PROT 6.5 (L) 02/14/2023    LABALBU 4.1 02/14/2023    BILITOT 0.9 02/14/2023    ALKPHOS 66 02/14/2023    AST 14 02/14/2023    ALT 14 02/14/2023    LABGLOM >60 02/14/2023    GFRAA >60 07/20/2022     Lab Results   Component Value Date    CHOL 129 (L) 12/01/2022    CHOL 109 (L) 06/08/2022    CHOL 105 (L) 06/25/2021     Lab Results   Component Value Date    TRIG 123 12/01/2022    TRIG 77 06/08/2022    TRIG 89 06/25/2021     Lab Results   Component Value Date    HDL 45 (L) 12/01/2022    HDL 39 (L) 06/08/2022    HDL 37 (L) 06/25/2021     Lab Results   Component Value Date    LDLCALC 59 12/01/2022    LDLCALC 55 06/08/2022    LDLCALC 50 06/25/2021     Lab Results   Component Value Date/Time     02/14/2023 09:04 AM    K 4.3 02/14/2023 09:04 AM     02/14/2023 09:04 AM    CO2 26 02/14/2023 09:04 AM    BUN 22 02/14/2023 09:04 AM    CREATININE 0.9 02/14/2023 09:04 AM    GLUCOSE 97 02/14/2023 09:04 AM    CALCIUM 9.9 02/14/2023 09:04 AM      Lab Results   Component Value Date    WBC 6.5 02/14/2023    HGB 14.3 02/14/2023    HCT 43.9 02/14/2023    MCV 96.1 (H) 02/14/2023     02/14/2023    LYMPHOPCT 29.7 02/14/2023    RBC 4.57 (L) 02/14/2023    MCH 31.3 (H) 02/14/2023    MCHC 32.6 (L) 02/14/2023    RDW 12.5 02/14/2023     Lab Results   Component Value Date    VITD25 64.4 12/01/2022     Labs reviewed from today   Diagnostics reviewed from today     IMPRESSION:          Symmetric thalamic hypoattenuation medially.This could represent an  age-indeterminate infarct.MRI of the brain with and without contrast is  recommended to further assess.  Subjective:      Review of Systems   Constitutional:  Negative for fatigue, fever and unexpected weight change.   HENT:  Negative for ear discharge, ear pain, mouth sores, sore throat and trouble swallowing.    Eyes:  Negative for discharge, itching and visual disturbance.   Respiratory:  Negative for cough, choking, shortness of breath, wheezing and stridor.    Cardiovascular:  Negative for chest pain, palpitations and leg swelling.   Gastrointestinal:  Negative for abdominal distention, abdominal pain, blood in stool, constipation, diarrhea, nausea and vomiting.   Endocrine: Negative for cold intolerance, polydipsia and polyuria.   Genitourinary:  Negative for difficulty urinating, dysuria, frequency and urgency.   Musculoskeletal:  Negative for arthralgias and gait problem.   Skin:  Negative for color change and rash.   Allergic/Immunologic: Negative for food allergies and immunocompromised state.  Neurological:  Negative for dizziness, tremors, syncope, speech difficulty, weakness and headaches. Memory loss   Hematological:  Negative for adenopathy. Does not bruise/bleed easily. Psychiatric/Behavioral:  Positive for confusion. Negative for hallucinations. Objective:     Physical Exam  Constitutional:       General: He is not in acute distress. Appearance: He is well-developed. HENT:      Head: Normocephalic and atraumatic. Eyes:      General: No scleral icterus. Right eye: No discharge. Left eye: No discharge. Pupils: Pupils are equal, round, and reactive to light. Neck:      Thyroid: No thyromegaly. Vascular: No JVD. Cardiovascular:      Rate and Rhythm: Normal rate and regular rhythm. Heart sounds: Normal heart sounds. No murmur heard. Pulmonary:      Effort: Pulmonary effort is normal. No respiratory distress. Breath sounds: Normal breath sounds. No wheezing or rales. Abdominal:      General: Bowel sounds are normal. There is no distension. Palpations: Abdomen is soft. There is no mass. Tenderness: There is no abdominal tenderness. There is no guarding or rebound. Musculoskeletal:         General: No tenderness. Normal range of motion. Cervical back: Normal range of motion and neck supple. Skin:     General: Skin is warm and dry. Findings: No erythema or rash. Neurological:      General: No focal deficit present. Mental Status: He is alert and oriented to person, place, and time. Cranial Nerves: No cranial nerve deficit. Sensory: No sensory deficit. Motor: No weakness. Coordination: Coordination normal.      Gait: Gait normal.      Deep Tendon Reflexes: Reflexes are normal and symmetric. Reflexes normal.   Psychiatric:         Mood and Affect: Mood is not depressed. Behavior: Behavior normal.         Thought Content:  Thought content normal.         Judgment: Judgment normal.     BP 122/68   Pulse 78   Temp 97.4 °F (36.3 °C)   Wt 206 lb (93.4 kg)   SpO2 96%   BMI 27.94 kg/m²           Assessment:      Problem List       Confusion - Primary     This is been just since Friday. We will get a CT and labs today          Relevant Orders    CBC with Auto Differential (Completed)    Comprehensive Metabolic Panel (Completed)    Urinalysis with Reflex to Culture (Completed)    CT HEAD W WO CONTRAST (Completed)    [address], ALLYN Jeronimo, Neurology, [place]    Memory loss     We will get labs and a CT of head today          Relevant Orders    CBC with Auto Differential (Completed)    Comprehensive Metabolic Panel (Completed)    Urinalysis with Reflex to Culture (Completed)    CT HEAD W WO CONTRAST (Completed)    ALLYN [facility], Neurology, [place]    Cognitive complaints    Relevant Orders    ALLYN Lopez, Neurology, [address] [place] [address]       Plan:        Patient given educational materials - see patient instructions. Discussed use, benefit, and side effects of prescribed medications. Allpatient questions answered. Pt voiced understanding. Reviewed health maintenance. Instructed to continue current medications, diet and exercise. Patient agreed with treatment plan. Follow up as directed. MEDICATIONS:  No orders of the defined types were placed in this encounter. ORDERS:  Orders Placed This Encounter   Procedures    CT HEAD W WO CONTRAST    CBC with Auto Differential    Comprehensive Metabolic Panel    Urinalysis with Reflex to [address], ALLYN Jeronimo, Neurology, [address] [place] [address]         Follow-up:  No follow-ups on file. PATIENT INSTRUCTIONS:  Patient Instructions   1. Confusion we will get labs and a CT today  2. Memory loss CT and labs today if all these are normal he will certainly need to see neurology and most likely get an MRI as his this is concern for cerebral event.   We will also need to do carotid artery studies  Electronically signed by ALLYN Jimenez on 2/14/2023 at 4:00 PM    @    Chata/transcription disclaimer:  Much of this encounter note is electronic transcription/translation of spoken language to printed texts. The electronic translation of spoken language may be erroneous, or at times,nonsensical words or phrases may be inadvertently transcribed.   Although I have reviewed the note for such errors, some may still exist.

## 2023-02-16 LAB
CULTURE EAR OR EYE: ABNORMAL
GRAM STAIN RESULT: ABNORMAL
ORGANISM: ABNORMAL

## 2023-02-17 ENCOUNTER — OFFICE VISIT (OUTPATIENT)
Dept: NEUROLOGY | Age: 78
End: 2023-02-17

## 2023-02-17 ENCOUNTER — TELEPHONE (OUTPATIENT)
Dept: NEUROLOGY | Age: 78
End: 2023-02-17

## 2023-02-17 VITALS
WEIGHT: 206 LBS | BODY MASS INDEX: 27.9 KG/M2 | SYSTOLIC BLOOD PRESSURE: 143 MMHG | OXYGEN SATURATION: 98 % | HEART RATE: 108 BPM | HEIGHT: 72 IN | DIASTOLIC BLOOD PRESSURE: 91 MMHG

## 2023-02-17 DIAGNOSIS — R25.1 TREMOR: ICD-10-CM

## 2023-02-17 DIAGNOSIS — I63.9 CEREBROVASCULAR ACCIDENT (CVA), UNSPECIFIED MECHANISM (HCC): ICD-10-CM

## 2023-02-17 DIAGNOSIS — I63.9 ACUTE CEREBROVASCULAR ACCIDENT (HCC): ICD-10-CM

## 2023-02-17 DIAGNOSIS — R41.3 MEMORY IMPAIRMENT: ICD-10-CM

## 2023-02-17 DIAGNOSIS — R41.3 MEMORY IMPAIRMENT: Primary | ICD-10-CM

## 2023-02-17 DIAGNOSIS — I70.90 ATHEROSCLEROSIS: ICD-10-CM

## 2023-02-17 LAB
C-REACTIVE PROTEIN: <0.3 MG/DL (ref 0–0.5)
SEDIMENTATION RATE, ERYTHROCYTE: 6 MM/HR (ref 0–15)
TSH REFLEX FT4: 2.33 UIU/ML (ref 0.35–5.5)
VITAMIN B-12: 407 PG/ML (ref 211–946)

## 2023-02-17 NOTE — TELEPHONE ENCOUNTER
----- Message from Jourdan Bajwa DO sent at 2/14/2023  4:38 PM CST -----  Pilar Recio,     Work this patient in soon, he will need an MRI to clarify the CT findings, he needs to go to the ED if worsens in the meantime. Travis Phillips   ----- Message -----  From: ALLYN Pierre  Sent: 2/14/2023  10:48 AM CST  To: Jourdan Bajwa DO    This  fellow likely had stroke thurs night/ Friday early am;  he is stable, short term memory and a little confusion but otherwise neuro intact ; he had labs and Ct and then went home to await results;        IMPRESSION: of ct done this am;      Symmetric thalamic hypoattenuation medially. This could represent an  age-indeterminate infarct. MRI of the brain with and without contrast is  recommended to further assess. He really doesn't need to go to the ER  he is stable and too late for meds;  can you see him or do you want me to get MRI?  MRA head and neck first?

## 2023-02-17 NOTE — PROGRESS NOTES
[facility] Neurology Office Note      Patient:   Zuly Sow  MR#:    [id number]  Account Number:                         YOB: 1945  Date of Evaluation:  2/17/2023  Time of Note:                          11:02 AM  Primary/Referring Physician:  ALLYN Torre   Consulting Physician:  ALLYN Argueta    NEW PATIENT CONSULTATION      Chief Complaint   Patient presents with    New Patient    Memory Loss       HISTORY OF PRESENT ILLNESS    Zuly Sow is a 65y.o. year old male here for evaluation of ongoing memory loss and new CVA. Memory loss has been ongoing for some time now. Here today with wife. On 2/11 he had episode of acute confusion. Wife denies other symptoms- weakness, trouble with speech, vision change, gait change, facial droop. Was seen by PCP on Tuesday and CT head noted acute CVA. Wife states confusion has not fully resolved. She feels like short term is worse, unsure of long term. There is word finding issues and repetition of questions. No issues with mood. Feels he is more quiet overall. Denies hallucinations. Appetite is decreased. Sleep is typically normal, wife does relate recently had a night where he was restless and had dreams through the night. He does not recall this. No falls. No medicine changes. Uses pill organizer for medicines, he is doing medicines without issues. He has had previous MRI with old CVA noted about 5 years ago. Family history of memory loss with brother. No known history of atrial fibrillation. Is on Plavix and ASA.        Past Medical History:   Diagnosis Date    Adenomatous colon polyp     CAD (coronary artery disease)     LAD stent 3/2005 [facility]    COPD (chronic obstructive pulmonary disease) ([facility] [address]     Hypertension     Male erectile dysfunction     Pure hypercholesterolemia     Sick sinus syndrome ([facility] [address]     Vertigo        Past Surgical History:   Procedure Laterality Date    MASTOIDECTOMY      left ear 3/15 tympanomastoidectomy, Dr Eliezer Jensen, [facility]       Family History   Problem Relation Age of Onset    Breast Cancer Mother     Emphysema Father        Social History     Socioeconomic History    Marital status:      Spouse name: Not on file    Number of children: Not on file    Years of education: Not on file    Highest education level: Not on file   Occupational History    Not on file   Tobacco Use    Smoking status: Every Day     Packs/day: 1.00     Years: 30.00     Pack years: 30.00     Types: Cigarettes    Smokeless tobacco: Never   Substance and Sexual Activity    Alcohol use: No    Drug use: Not on file    Sexual activity: Not on file   Other Topics Concern    Not on file   Social History Narrative    Not on file     Social Determinants of Health     Financial Resource Strain: Low Risk     Difficulty of Paying Living Expenses: Not hard at all   Food Insecurity: No Food Insecurity    Worried About [address] in the Last Year: Never true    [address] in the Last Year: Never true   Transportation Needs: Unknown    Lack of Transportation (Medical): Not on file    Lack of Transportation (Non-Medical):  No   Physical Activity: Sufficiently Active    Days of Exercise per Week: 7 days    Minutes of Exercise per Session: 60 min   Stress: Not on file   Social Connections: Not on file   Intimate Partner Violence: Not on file   Housing Stability: Unknown    Unable to Pay for Housing in the Last Year: Not on file    Number of Places Lived in the Last Year: Not on file    Unstable Housing in the Last Year: No       Current Outpatient Medications   Medication Sig Dispense Refill    lovastatin (MEVACOR) 20 MG tablet TAKE 1 TABLET BY MOUTH EVERY NIGHT 90 tablet 1    lisinopril (PRINIVIL;ZESTRIL) 2.5 MG tablet TAKE 1 TABLET BY MOUTH DAILY 90 tablet 3    fluticasone (FLONASE) 50 MCG/ACT nasal spray SHAKE LIQUID AND USE 2 SPRAYS IN EACH NOSTRIL DAILY 16 g 5    ofloxacin (OCUFLOX) 0.3 % solution 4 drops to the right ear twice a day x10 days 10 mL 0    fluticasone-salmeterol (ADVAIR DISKUS) 250-50 MCG/ACT AEPB diskus inhaler INHALE 1 PUFF BY MOUTH TWICE DAILY 60 each 2    clopidogrel (PLAVIX) 75 MG tablet TAKE 1 TABLET BY MOUTH EVERY DAY 90 tablet 1    vitamin D (ERGOCALCIFEROL) 1.25 MG (30364 UT) CAPS capsule TAKE 1 CAPSULE BY MOUTH 1 TIME A WEEK 12 capsule 1    nitroGLYCERIN (NITROSTAT) 0.4 MG SL tablet Place 1 tablet under the tongue every 5 minutes as needed for Chest pain up to max of 3 total doses. If no relief after 1 dose, call 911. 25 tablet 1    gabapentin (NEURONTIN) 300 MG capsule TAKE 1 CAPSULE BY MOUTH EVERY 8 HOURS AS NEEDED  5    Cyanocobalamin (VITAMIN B 12 PO) Take 1 tablet by mouth daily      aspirin 81 MG EC tablet Take 81 mg by mouth daily      oxyCODONE-acetaminophen (PERCOCET) 7.5-325 MG per tablet Take 1 tablet by mouth 2 times daily as needed. ibuprofen (ADVIL;MOTRIN) 800 MG tablet Take 1 tablet by mouth 2 times daily as needed for Pain 120 tablet 1    celecoxib (CELEBREX) 100 MG capsule Take 1 capsule by mouth daily (Patient not taking: Reported on 2/17/2023) 60 capsule 3    azelastine (ASTELIN) 0.1 % nasal spray 1 spray by Nasal route 2 times daily Use in each nostril as directed (Patient not taking: Reported on 2/17/2023) 60 mL 5     No current facility-administered medications for this visit.        No Known Allergies      REVIEW OF SYSTEMS  Constitutional: []Fever []Sweat []Chills [] Recent Injury [x] Denies all unless marked  HEENT:[]Headache  [] Head Injury/Hearing Loss  [] Sore Throat  [] Ear Ache/Dizziness  [x] Denies all unless marked  Spine:  [] Neck pain  [] Back pain  [] Sciaticia  [x] Denies all unless marked  Cardiovascular:[]Heart Disease []Chest Pain [] Palpitations  [x] Denies all unless marked  Pulmonary: []Shortness of Breath []Cough   [x] Denies all unless marke  Gastrointestinal: []Nausea  []Vomiting  []Abdominal Pain  []Constipation  []Diarrhea  []Dark Bloody Stools  [x] Denies all unless marked  Psychiatric/Behavioral:[] Depression [] Anxiety [x] Denies all unless marked  Genitourinary:   [] Frequency  [] Urgency  [] Incontinence [] Pain with Urination  [x] Denies all unless marked  Extremities: []Pain  []Swelling  [x] Denies all unless marked  Musculoskeletal: [] Muscle Pain  [] Joint Pain  [] Arthritis [] Muscle Cramps [] Muscle Twitches  [x] Denies all unless marked  Sleep: [] Insomnia [] Snoring [] Restless Legs [] Sleep Apnea  [] Daytime Sleepiness  [x] Denies all unless marked  Skin:[] Rash [] Skin Discoloration [x] Denies all unless marked   Neurological: [x]Visual Disturbance/Memory Loss [] Loss of Balance [] Slurred Speech/Weakness [] Seizures  [] Vertigo/Dizziness [x] Denies all unless marked    The MA has completed the ROS with the patient. I have reviewed it in its' entirety with the patient and agree with the documentation. PHYSICAL EXAM  BP (!) 143/91   Pulse (!) 108   Ht 6' (1.829 m)   Wt 206 lb (93.4 kg)   SpO2 98%   BMI 27.94 kg/m²       Constitutional - No acute distress    HEENT- Conjunctiva normal.  No scars, masses, or lesions over external nose or ears, no neck masses noted, no jugular vein distension, no bruit  Cardiac- Regular rate and rhythm  Pulmonary- Good expansion, normal effort without use of accessory muscles  Musculoskeletal - No significant wasting of muscles noted, no bony deformities  Extremities - No clubbing, cyanosis or edema  Skin - Warm, dry, and intact. No rash, erythema, or pallor  Psychiatric - Mood, affect, and behavior appear normal      NEUROLOGICAL EXAM     Mental status   [x] Awake, alert, oriented   [x]Affect attention and concentration appear appropriate  [x]Recent and remote memory appears unremarkable  [x]Speech normal without dysarthria or aphasia, comprehension and repetition intact.    COMMENTS:MoCa 15/30- year 2005, month unsure, age- correct 65, place was unsure but correct- hospital    Cranial Nerves [x]No VF deficit to confrontation  [x]PERRLA, EOMI, no nystagmus, conjugate eye movements, no ptosis  [x]Face symmetric  [x]Facial sensation intact  [x]Tongue midline no atrophy or fasciculations present  [x]Palate midline, hearing to finger rub normal bilaterally  [x]Shoulder shrug and SCM testing normal bilaterally  COMMENTS:   Motor   [x]5/5 strength x 4 extremities  [x]Normal bulk and tone  []No tremor present  [x]No rigidity or bradykinesia noted  COMMENTS:L hand tremor with intention   Sensory  [x]Sensation intact to light touch, vibration, and proprioception BLE  [x]Sensation intact to light touch, vibration, and proprioception BUE  COMMENTS:   Coordination [x]FTN normal bilaterally   [x]HTS normal bilaterally  [x]CHARLES normal bilaterally.    COMMENTS:   Reflexes  [x]Symmetric and non-pathological  [x]Toes down going bilaterally  [x]No clonus present  COMMENTS:   Gait                  [x]Normal steady gait    []Ataxic    []Spastic     []Magnetic     []Shuffling  COMMENTS:cautious         LABS RECORD AND IMAGING REVIEW (As below and per HPI)    Lab Results   Component Value Date    KRIZKLPF23 407 02/17/2023     Lab Results   Component Value Date    WBC 6.5 02/14/2023    HGB 14.3 02/14/2023    HCT 43.9 02/14/2023    MCV 96.1 (H) 02/14/2023     02/14/2023     Lab Results   Component Value Date     02/14/2023    K 4.3 02/14/2023     02/14/2023    CO2 26 02/14/2023    BUN 22 02/14/2023    CREATININE 0.9 02/14/2023    GLUCOSE 97 02/14/2023    CALCIUM 9.9 02/14/2023    PROT 6.5 (L) 02/14/2023    LABALBU 4.1 02/14/2023    BILITOT 0.9 02/14/2023    ALKPHOS 66 02/14/2023    AST 14 02/14/2023    ALT 14 02/14/2023    LABGLOM >60 02/14/2023    GFRAA >60 07/20/2022     Lab Results   Component Value Date    CHOL 129 (L) 12/01/2022    TRIG 123 12/01/2022    HDL 45 (L) 12/01/2022    LDLCALC 59 12/01/2022     Lab Results   Component Value Date    TSH 2.140 12/01/2022     Lab Results   Component Value Date    SEDRATE 6 02/17/2023 CT HEAD W WO CONTRAST    Result Date: 2/17/2023  -----ADDENDUM-----: Receipt of report was confirmed 2/14/23 10:18a ROLANDO GRUBER -----ORIGINAL REPORT-----: Exam: CT head with and without contrast History: Confusion Prior Exam: None. Technique: Contiguous axial CT images of the head were obtained with and without contrast, per the ordering facility protocol. Findings: The scalp is within normal limits. There is no acute intracranial hemorrhage. No CT evidence of acute major vascular territory infarct. There is symmetric hypoattenuation in the bilateral thalamus. This location of abnormal attenuation can be seen with the artery of [place]heron infarct. There is no mass-effect, midline shift, or herniation. No abnormal enhancement. No hydrocephalus. The included orbital structures are within normal limits. The included paranasal sinuses are grossly clear. The mastoids are unremarkable. Symmetric thalamic hypoattenuation medially. This could represent an age-indeterminate infarct. MRI of the brain with and without contrast is recommended to further assess. MRI brain W WO 5/25/17  Impression    1. Negative MR imaging of the internal auditory canals without masses or   abnormal enhancement. 2. Bilateral mastoid sinus disease. 3. Minimal paranasal sinus disease. 4. Old right thalamic lacunar type infarct. Minimal chronic ischemic   white matter change likely. 5. No MR evidence of acute intracranial process. This report was finalized on 05/26/2017 08:13 by Dr. Malini Padgett MD.    Reviewed referral records     ASSESSMENT:    Adelaida George is a 65y.o. year old male here for evaluation of memory loss and new CVA. He has had some mild progressive memory loss for several years. He does have history of CVA. Is being seen here today due to acute confusion that started on Saturday. Was evaluated by primary care doctor on Tuesday with no clear metabolic source identified and blood work and urine.   He did have CT of head that showed age indeterminant infarct in his thalamus. MRI brain that was completed in 2017 does note an old right thalamic lacunar infarct. Exam today reveals that he is neurologically intact with the exception of memory. He does have underlying memory loss, so hard to know how much of this is chronic. Wife does state he is more confused and has not returned to baseline since Saturday. We will complete CVA work-up including ultrasound carotids, MRI brain with and without, echocardiogram, and Zio patch. Lab work ordered today to further evaluate memory loss. ED for any new strokelike symptoms. ICD-10-CM    1. Memory impairment  R41.3 VL DUP CAROTID BILATERAL     MRI BRAIN W WO CONTRAST     ANTONIA Screen with Reflex     C-Reactive Protein     Methylmalonic Acid, Serum     Vitamin B12     Vitamin B1, Whole Blood     TSH with Reflex to FT4     Sedimentation Rate     RPR      2. Tremor  R25.1 MRI BRAIN W WO CONTRAST      3. Cerebrovascular accident (CVA), unspecified mechanism ([address]  I63.9 VL DUP CAROTID BILATERAL     Echo 2d w doppler w color w contrast     LONGTERM CONTINUOUS CARDIAC EVENT MONITOR (ZIO)     MRI BRAIN W WO CONTRAST     ANTONIA Screen with Reflex     C-Reactive Protein     Methylmalonic Acid, Serum     Vitamin B12     Vitamin B1, Whole Blood     TSH with Reflex to FT4     Sedimentation Rate     RPR      4. Acute cerebrovascular accident ([address]  I63.9 VL DUP CAROTID BILATERAL     Echo 2d w doppler w color w contrast     LONGTERM CONTINUOUS CARDIAC EVENT MONITOR (ZIO)     MRI BRAIN W WO CONTRAST     ANTONIA Screen with Reflex     C-Reactive Protein     Methylmalonic Acid, Serum     Vitamin B12     Vitamin B1, Whole Blood     TSH with Reflex to FT4     Sedimentation Rate     RPR      5. Atherosclerosis  I70.90 VL DUP CAROTID BILATERAL          PLAN:  MRI brain with without. Echocardiogram.  Zio patch. Ultrasound carotid arteries.   Maximize stroke risk factors through primary care doctor including blood pressure, cholesterol, and glucose control. Continue Plavix, aspirin, statin. 7.  Can consider adding in Namenda at next visit. 8. Discussed safety concerns, increase supervision, limited to no driving, medication monitoring/management. Recommend 30 minutes daily exercise, daily mental stimulation, and healthy diet with fish, fruits, vegetables, fiber and water   9. Return in about 3 months (around 5/17/2023).     ALLYN Fields

## 2023-02-17 NOTE — TELEPHONE ENCOUNTER
Called patients wife to schedule an appt they seen Bwalters today. BW ordered tests today also   All taken care of.

## 2023-02-19 LAB
ANA IGG, ELISA: NORMAL
METHYLMALONIC ACID: 0.15 UMOL/L (ref 0–0.4)

## 2023-02-20 LAB — RPR: NORMAL

## 2023-02-21 LAB — VITAMIN B1 WHOLE BLOOD: 159 NMOL/L (ref 70–180)

## 2023-02-26 ENCOUNTER — NURSE TRIAGE (OUTPATIENT)
Dept: CALL CENTER | Facility: HOSPITAL | Age: 78
End: 2023-02-26
Payer: MEDICARE

## 2023-02-26 NOTE — TELEPHONE ENCOUNTER
"Wife is caller  had a stroke a couple of weeks ago. Affected his memory  C/o H/A this morning, he has laid back down to sleep.  Triage done Care advice given  Discussed s/s to seek medical care.      Reason for Disposition  • [1] Headache AND [2] has not taken pain medications    Additional Information  • Negative: Difficult to awaken or acting confused (e.g., disoriented, slurred speech)  • Negative: [1] Weakness of the face, arm or leg on one side of the body AND [2] new-onset  • Negative: [1] Numbness of the face, arm or leg on one side of the body AND [2] new-onset  • Negative: [1] Loss of speech or garbled speech AND [2] new-onset  • Negative: Passed out (i.e., lost consciousness, collapsed and was not responding)  • Negative: Sounds like a life-threatening emergency to the triager  • Negative: Followed a head injury  • Negative: Pregnant  • Negative: Postpartum (from 0 to 6 weeks after delivery)  • Negative: Traumatic Brain Injury (TBI) is suspected  • Negative: Unable to walk, or can only walk with assistance (e.g., requires support)  • Negative: Stiff neck (can't touch chin to chest)  • Negative: Severe pain in one eye  • Negative: [1] Other family members (or roommates) with headaches AND [2] possibility of carbon monoxide exposure  • Negative: [1] SEVERE headache (e.g., excruciating) AND [2] \"worst headache\" of life  • Negative: [1] SEVERE headache AND [2] sudden-onset (i.e., reaching maximum intensity within seconds to 1 hour)  • Negative: [1] SEVERE headache AND [2] fever  • Negative: Loss of vision or double vision (Exception: same as prior migraines)  • Negative: [1] Fever > 100.0 F (37.8 C) AND [2] diabetes mellitus or weak immune system (e.g., HIV positive, cancer chemo, splenectomy, organ transplant, chronic steroids)  • Negative: Patient sounds very sick or weak to the triager  • Negative: [1] SEVERE headache (e.g., excruciating) AND [2] not improved after 2 hours of pain medicine  • " "Negative: [1] Vomiting AND [2] 2 or more times (Exception: similar to previous migraines)  • Negative: Fever > 104 F (40 C)  • Negative: [1] MODERATE headache (e.g., interferes with normal activities) AND [2] present > 24 hours AND [3] unexplained  (Exceptions: analgesics not tried, typical migraine, or headache part of viral illness)  • Negative: [1] New headache AND [2] weak immune system (e.g., HIV positive, cancer chemo, splenectomy, organ transplant, chronic steroids)  • Negative: [1] New headache AND [2] age > 50  • Negative: [1] Sinus pain of forehead AND [2] yellow or green nasal discharge  • Negative: Fever present > 3 days (72 hours)  • Negative: [1] MILD-MODERATE headache AND [2] present > 72 hours  • Negative: Headache started during exertion (e.g., sex, strenuous exercise, heavy lifting)  • Negative: Headache is a chronic symptom (recurrent or ongoing AND present > 4 weeks)  • Negative: Similar to previously diagnosed migraine headaches  • Negative: Similar to previously diagnosed muscle-tension headaches    Answer Assessment - Initial Assessment Questions  1. LOCATION: \"Where does it hurt?\"    headache, back of head  2. ONSET: \"When did the headache start?\" (Minutes, hours or days)       45 minutes  3. PATTERN: \"Does the pain come and go, or has it been constant since it started?\"    Wife is not sure  4. SEVERITY: \"How bad is the pain?\" and \"What does it keep you from doing?\"  (e.g., Scale 1-10; mild, moderate, or severe)    - MILD (1-3): doesn't interfere with normal activities     - MODERATE (4-7): interferes with normal activities or awakens from sleep     - SEVERE (8-10): excruciating pain, unable to do any normal activities     Not sure, he has gone back to sleep  5. RECURRENT SYMPTOM: \"Have you ever had headaches before?\" If Yes, ask: \"When was the last time?\" and \"What happened that time?\"   occ had h/a prior to stroke  6. CAUSE: \"What do you think is causing the headache?\"   dull pain  7. " "MIGRAINE: \"Have you been diagnosed with migraine headaches?\" If Yes, ask: \"Is this headache similar?\"       *No Answer*  8. HEAD INJURY: \"Has there been any recent injury to the head?\"   na  9. OTHER SYMPTOMS: \"Do you have any other symptoms?\" (fever, stiff neck, eye pain, sore throat, cold symptoms)     denies  10. PREGNANCY: \"Is there any chance you are pregnant?\" \"When was your last menstrual period?\"    na    Protocols used: HEADACHE-ADULT-AH      "

## 2023-02-27 ENCOUNTER — TELEPHONE (OUTPATIENT)
Dept: NEUROLOGY | Age: 78
End: 2023-02-27

## 2023-02-27 NOTE — TELEPHONE ENCOUNTER
Luis A's wife leslie requests that a nurse return their call. Per wife pt recently had stroke, has been having symptoms of tiredness, cold, dull pain in back of head, and loss of appetite. Se is concerned of how he is feeling and he has echo scheduled tomorrow. The best time to reach [facility]  is Anytime. Thank you.

## 2023-02-27 NOTE — TELEPHONE ENCOUNTER
Called and spoke with patients wife leslie. Explained that these are not symptoms concerning of a stroke. But he feels worse that the should go to the ER. She voiced her understanding and had no other questions at this time.

## 2023-02-28 ENCOUNTER — HOSPITAL ENCOUNTER (OUTPATIENT)
Dept: NON INVASIVE DIAGNOSTICS | Age: 78
Discharge: HOME | End: 2023-02-28
Payer: MEDICARE

## 2023-02-28 ENCOUNTER — TELEPHONE (OUTPATIENT)
Dept: NEUROLOGY | Age: 78
End: 2023-02-28

## 2023-02-28 DIAGNOSIS — I63.9 ACUTE CEREBROVASCULAR ACCIDENT (HCC): ICD-10-CM

## 2023-02-28 DIAGNOSIS — I25.10 CORONARY ARTERY DISEASE, UNSPECIFIED VESSEL OR LESION TYPE, UNSPECIFIED WHETHER ANGINA PRESENT, UNSPECIFIED WHETHER NATIVE OR TRANSPLANTED HEART: Primary | ICD-10-CM

## 2023-02-28 DIAGNOSIS — I63.9 CEREBROVASCULAR ACCIDENT (CVA), UNSPECIFIED MECHANISM (HCC): ICD-10-CM

## 2023-02-28 LAB
LV EF: 58 %
LVEF MODALITY: NORMAL

## 2023-02-28 PROCEDURE — C8929 TTE W OR WO FOL WCON,DOPPLER: HCPCS

## 2023-02-28 PROCEDURE — 93246 EXT ECG>7D<15D RECORDING: CPT

## 2023-02-28 PROCEDURE — 6360000004 HC RX CONTRAST MEDICATION: Performed by: NURSE PRACTITIONER

## 2023-02-28 RX ADMIN — PERFLUTREN 1.5 ML: 6.52 INJECTION, SUSPENSION INTRAVENOUS at 15:54

## 2023-02-28 NOTE — TELEPHONE ENCOUNTER
Patient and patients wife stopped by office. They state that when going to have patients echo performed and zio monitor placed that it was relayed to them that the zio monitor had been approved and the echo had not. Advised patient and his wife that per his chart it showed that the Echo had been authorized by insurance and ZIO monitor had not. Explained that insurance typically will want the echo done before ZIO monitor authorization. She states that they will not be able to have ECHO performed if not certain that it was authorized. Showed patient and his wife where the echo showed that it was authorized. Encouraged patient and his wife to get test completed. They voiced their understanding and voiced they would go talk to the Echo department again.

## 2023-03-01 DIAGNOSIS — R93.1 ABNORMAL ECHOCARDIOGRAM: Primary | ICD-10-CM

## 2023-03-02 ENCOUNTER — TELEPHONE (OUTPATIENT)
Dept: NEUROLOGY | Age: 78
End: 2023-03-02

## 2023-03-02 NOTE — TELEPHONE ENCOUNTER
----- Message from Renella Canavan, APRN - CNP sent at 3/1/2023 10:20 AM CST -----  Abnormal echocardiogram, there is evidence of significant intra-atrial communications. Will refer to cardiology.

## 2023-03-02 NOTE — TELEPHONE ENCOUNTER
Called and spoke with patients wife. Relayed to her BW note seen below. She voiced her understanding and had no questions at this time.

## 2023-03-06 ENCOUNTER — HOSPITAL ENCOUNTER (OUTPATIENT)
Dept: MRI IMAGING | Age: 78
Discharge: HOME | End: 2023-03-06
Payer: MEDICARE

## 2023-03-06 ENCOUNTER — OFFICE VISIT (OUTPATIENT)
Dept: INTERNAL MEDICINE | Age: 78
End: 2023-03-06
Payer: MEDICARE

## 2023-03-06 ENCOUNTER — TELEPHONE (OUTPATIENT)
Dept: NEUROLOGY | Age: 78
End: 2023-03-06

## 2023-03-06 VITALS
SYSTOLIC BLOOD PRESSURE: 138 MMHG | TEMPERATURE: 99.2 F | DIASTOLIC BLOOD PRESSURE: 64 MMHG | OXYGEN SATURATION: 97 % | HEART RATE: 60 BPM

## 2023-03-06 DIAGNOSIS — I63.9 ACUTE CEREBROVASCULAR ACCIDENT (HCC): ICD-10-CM

## 2023-03-06 DIAGNOSIS — R41.3 MEMORY IMPAIRMENT: ICD-10-CM

## 2023-03-06 DIAGNOSIS — R51.9 ACUTE INTRACTABLE HEADACHE, UNSPECIFIED HEADACHE TYPE: ICD-10-CM

## 2023-03-06 DIAGNOSIS — R25.1 TREMOR: ICD-10-CM

## 2023-03-06 DIAGNOSIS — I63.20 CEREBRAL INFARCTION DUE TO STENOSIS OF PRECEREBRAL ARTERY (HCC): ICD-10-CM

## 2023-03-06 DIAGNOSIS — I63.9 CEREBROVASCULAR ACCIDENT (CVA), UNSPECIFIED MECHANISM (HCC): ICD-10-CM

## 2023-03-06 PROCEDURE — 70553 MRI BRAIN STEM W/O & W/DYE: CPT

## 2023-03-06 PROCEDURE — 3075F SYST BP GE 130 - 139MM HG: CPT | Performed by: NURSE PRACTITIONER

## 2023-03-06 PROCEDURE — G8484 FLU IMMUNIZE NO ADMIN: HCPCS | Performed by: NURSE PRACTITIONER

## 2023-03-06 PROCEDURE — 4004F PT TOBACCO SCREEN RCVD TLK: CPT | Performed by: NURSE PRACTITIONER

## 2023-03-06 PROCEDURE — G8417 CALC BMI ABV UP PARAM F/U: HCPCS | Performed by: NURSE PRACTITIONER

## 2023-03-06 PROCEDURE — 6360000004 HC RX CONTRAST MEDICATION: Performed by: NURSE PRACTITIONER

## 2023-03-06 PROCEDURE — G8427 DOCREV CUR MEDS BY ELIG CLIN: HCPCS | Performed by: NURSE PRACTITIONER

## 2023-03-06 PROCEDURE — A9577 INJ MULTIHANCE: HCPCS | Performed by: NURSE PRACTITIONER

## 2023-03-06 PROCEDURE — 1123F ACP DISCUSS/DSCN MKR DOCD: CPT | Performed by: NURSE PRACTITIONER

## 2023-03-06 PROCEDURE — 3078F DIAST BP <80 MM HG: CPT | Performed by: NURSE PRACTITIONER

## 2023-03-06 RX ORDER — PROMETHAZINE HYDROCHLORIDE 25 MG/ML
25 INJECTION, SOLUTION INTRAMUSCULAR; INTRAVENOUS ONCE
Status: COMPLETED | OUTPATIENT
Start: 2023-03-06 | End: 2023-03-06

## 2023-03-06 RX ORDER — KETOROLAC TROMETHAMINE 30 MG/ML
60 INJECTION, SOLUTION INTRAMUSCULAR; INTRAVENOUS ONCE
Status: COMPLETED | OUTPATIENT
Start: 2023-03-06 | End: 2023-03-06

## 2023-03-06 RX ADMIN — GADOBENATE DIMEGLUMINE 19 ML: 529 INJECTION, SOLUTION INTRAVENOUS at 17:10

## 2023-03-06 RX ADMIN — PROMETHAZINE HYDROCHLORIDE 25 MG: 25 INJECTION, SOLUTION INTRAMUSCULAR; INTRAVENOUS at 14:39

## 2023-03-06 RX ADMIN — KETOROLAC TROMETHAMINE 60 MG: 30 INJECTION, SOLUTION INTRAMUSCULAR; INTRAVENOUS at 14:37

## 2023-03-06 ASSESSMENT — ENCOUNTER SYMPTOMS
BLOOD IN STOOL: 0
COLOR CHANGE: 0
VOMITING: 0
DIARRHEA: 0
WHEEZING: 0
CONSTIPATION: 0
SHORTNESS OF BREATH: 0
ABDOMINAL PAIN: 0
NAUSEA: 0
CHOKING: 0
EYE DISCHARGE: 0
SORE THROAT: 0
ABDOMINAL DISTENTION: 0
COUGH: 0
EYE ITCHING: 0
STRIDOR: 0
TROUBLE SWALLOWING: 0

## 2023-03-06 NOTE — PATIENT INSTRUCTIONS
1.  Recent CVA with new onset headache we will get an MRI today I just want to see if the lab will add some tests for me.  Can they do it?

## 2023-03-06 NOTE — TELEPHONE ENCOUNTER
Patients wife Ana Cristina Senters called stating that over the past week patient has had a constant headache in the lower neck. Patient has taken aleve and it hasnt helped with headache. Patient is seeing pcp today at 2.  Patient would like a call back

## 2023-03-06 NOTE — TELEPHONE ENCOUNTER
Spoke with patients wife and informed them of [facility] message    \" He was really not evaluated for headaches when he was here. He is in the process of testing, he has had abnormal echocardiogram and I have referred him to cardiology. I do not think this is related to headaches however. He still has ultrasound carotids, MRI brain, and Zio patch pending. I do agree that he should follow-up with primary care doctor today for further evaluation.  \"

## 2023-03-06 NOTE — PROGRESS NOTES
[address] [facility]  [address] [address] [place] [address]  Dept: [phone]  Dept Fax: [phone]  Loc: [phone]    Prince Henry (:  1945) is a 65 y.o. male,Established patient  with green , here for evaluation of the following chief complaint(s): Headache      Prince Henry is a 65 y.o. male who presents today for his medical conditions/complaints as noted below. Prince Henry is c/robinson Headache        HPI:     Chief Complaint   Patient presents with    Headache     HPI    Headache since sat;  day and night;  he never gone away  Recent CVA bilateral thalamus;  in the artery of [place] for infaract;  he had seen neurology;  and had MRI set up for the ; it was delayed as they had a Zio patch put on. With these new findings we will remove the Zio patch his wife has everything to put back on at home and get the MRI today and then she can replace it.         Past Medical History:   Diagnosis Date    Adenomatous colon polyp     CAD (coronary artery disease)     LAD stent 3/2005 [facility]    COPD (chronic obstructive pulmonary disease) ([facility] [address]     Hypertension     Male erectile dysfunction     Pure hypercholesterolemia     Sick sinus syndrome ([facility] [address]     Vertigo       Past Surgical History:   Procedure Laterality Date    MASTOIDECTOMY      left ear 3/15 tympanomastoidectomy, Dr Cathie Faulkner, [facility]       Vitals 3/6/2023 2023 2023 2023 2023    SYSTOLIC [phone] [phone]   DIASTOLIC 64 91 68 72 66 74   Pulse 60 108 78 - - 65   Temp 99.2 - 97.4 - - 97.1   Resp - - - - - -   SpO2 97 98 96 - - 98   Weight - 206 lb 206 lb 209 lb 207 lb 206 lb   Height - 6' 0\" - 6' 0\" - 6' 0\"   Body mass index - 27.94 kg/m2 - 28.34 kg/m2 - 27.94 kg/m2   Some recent data might be hidden       Family History   Problem Relation Age of Onset    Breast Cancer Mother     Emphysema Father        Social History     Tobacco Use Smoking status: Every Day     Packs/day: 1.00     Years: 30.00     Pack years: 30.00     Types: Cigarettes    Smokeless tobacco: Never   Substance Use Topics    Alcohol use: No      Current Outpatient Medications   Medication Sig Dispense Refill    lovastatin (MEVACOR) 20 MG tablet TAKE 1 TABLET BY MOUTH EVERY NIGHT 90 tablet 1    lisinopril (PRINIVIL;ZESTRIL) 2.5 MG tablet TAKE 1 TABLET BY MOUTH DAILY 90 tablet 3    fluticasone (FLONASE) 50 MCG/ACT nasal spray SHAKE LIQUID AND USE 2 SPRAYS IN EACH NOSTRIL DAILY 16 g 5    ofloxacin (OCUFLOX) 0.3 % solution 4 drops to the right ear twice a day x10 days 10 mL 0    fluticasone-salmeterol (ADVAIR DISKUS) 250-50 MCG/ACT AEPB diskus inhaler INHALE 1 PUFF BY MOUTH TWICE DAILY 60 each 2    clopidogrel (PLAVIX) 75 MG tablet TAKE 1 TABLET BY MOUTH EVERY DAY 90 tablet 1    vitamin D (ERGOCALCIFEROL) 1.25 MG (83866 UT) CAPS capsule TAKE 1 CAPSULE BY MOUTH 1 TIME A WEEK 12 capsule 1    celecoxib (CELEBREX) 100 MG capsule Take 1 capsule by mouth daily (Patient not taking: Reported on 2/17/2023) 60 capsule 3    azelastine (ASTELIN) 0.1 % nasal spray 1 spray by Nasal route 2 times daily Use in each nostril as directed (Patient not taking: Reported on 2/17/2023) 60 mL 5    nitroGLYCERIN (NITROSTAT) 0.4 MG SL tablet Place 1 tablet under the tongue every 5 minutes as needed for Chest pain up to max of 3 total doses. If no relief after 1 dose, call 911. 25 tablet 1    gabapentin (NEURONTIN) 300 MG capsule TAKE 1 CAPSULE BY MOUTH EVERY 8 HOURS AS NEEDED  5    Cyanocobalamin (VITAMIN B 12 PO) Take 1 tablet by mouth daily      aspirin 81 MG EC tablet Take 81 mg by mouth daily      oxyCODONE-acetaminophen (PERCOCET) 7.5-325 MG per tablet Take 1 tablet by mouth 2 times daily as needed.       ibuprofen (ADVIL;MOTRIN) 800 MG tablet Take 1 tablet by mouth 2 times daily as needed for Pain 120 tablet 1     Current Facility-Administered Medications   Medication Dose Route Frequency Provider Last Rate Last Admin    ketorolac (TORADOL) injection 60 mg  60 mg IntraMUSCular Once ALLYN Torre        promethazine (PHENERGAN) injection 25 mg  25 mg IntraMUSCular Once ALLYN Torre         No Known Allergies    Health Maintenance   Topic Date Due    Shingles vaccine (1 of 2) Never done    DTaP/Tdap/Td vaccine (1 - Tdap) 12/13/2023 (Originally 4/3/1964)    COVID-19 Vaccine (1) 12/24/2024 (Originally 1945)    Low dose CT lung screening  06/28/2023    Lipids  12/01/2023    Annual Wellness Visit (AWV)  12/14/2023    Depression Screen  02/14/2024    Colorectal Cancer Screen  01/22/2029    Flu vaccine  Completed    Pneumococcal 65+ years Vaccine  Completed    Hepatitis C screen  Completed    Hepatitis A vaccine  Aged Out    Hib vaccine  Aged Out    Meningococcal (ACWY) vaccine  Aged Out       No results found for: LABA1C  Lab Results   Component Value Date    PSA 1.37 06/08/2022    PSA 1.60 12/30/2020    PSA 1.71 12/30/2019     TSH   Date Value Ref Range Status   12/01/2022 2.140 0.270 - 4.200 uIU/mL Final   ]  Lab Results   Component Value Date     02/14/2023    K 4.3 02/14/2023     02/14/2023    CO2 26 02/14/2023    BUN 22 02/14/2023    CREATININE 0.9 02/14/2023    GLUCOSE 97 02/14/2023    CALCIUM 9.9 02/14/2023    PROT 6.5 (L) 02/14/2023    LABALBU 4.1 02/14/2023    BILITOT 0.9 02/14/2023    ALKPHOS 66 02/14/2023    AST 14 02/14/2023    ALT 14 02/14/2023    LABGLOM >60 02/14/2023    GFRAA >60 07/20/2022     Lab Results   Component Value Date    CHOL 129 (L) 12/01/2022    CHOL 109 (L) 06/08/2022    CHOL 105 (L) 06/25/2021     Lab Results   Component Value Date    TRIG 123 12/01/2022    TRIG 77 06/08/2022    TRIG 89 06/25/2021     Lab Results   Component Value Date    HDL 45 (L) 12/01/2022    HDL 39 (L) 06/08/2022    HDL 37 (L) 06/25/2021     Lab Results   Component Value Date    LDLCALC 59 12/01/2022    LDLCALC 55 06/08/2022    LDLCALC 50 06/25/2021     Lab Results   Component Value Date/Time     02/14/2023 09:04 AM    K 4.3 02/14/2023 09:04 AM     02/14/2023 09:04 AM    CO2 26 02/14/2023 09:04 AM    BUN 22 02/14/2023 09:04 AM    CREATININE 0.9 02/14/2023 09:04 AM    GLUCOSE 97 02/14/2023 09:04 AM    CALCIUM 9.9 02/14/2023 09:04 AM      Lab Results   Component Value Date    WBC 6.5 02/14/2023    HGB 14.3 02/14/2023    HCT 43.9 02/14/2023    MCV 96.1 (H) 02/14/2023     02/14/2023    LYMPHOPCT 29.7 02/14/2023    RBC 4.57 (L) 02/14/2023    MCH 31.3 (H) 02/14/2023    MCHC 32.6 (L) 02/14/2023    RDW 12.5 02/14/2023     Lab Results   Component Value Date    VITD25 64.4 12/01/2022     Diagnostics reviewed from today     Subjective:      Review of Systems   Constitutional:  Negative for fatigue, fever and unexpected weight change. HENT:  Negative for ear discharge, ear pain, mouth sores, sore throat and trouble swallowing. Eyes:  Negative for discharge, itching and visual disturbance. Respiratory:  Negative for cough, choking, shortness of breath, wheezing and stridor. Cardiovascular:  Negative for chest pain, palpitations and leg swelling. Gastrointestinal:  Negative for abdominal distention, abdominal pain, blood in stool, constipation, diarrhea, nausea and vomiting. Endocrine: Negative for cold intolerance, polydipsia and polyuria. Genitourinary:  Negative for difficulty urinating, dysuria, frequency and urgency. Musculoskeletal:  Negative for arthralgias and gait problem. Skin:  Negative for color change and rash. Allergic/Immunologic: Negative for food allergies and immunocompromised state. Neurological:  Positive for headaches. Negative for dizziness, tremors, syncope, speech difficulty and weakness. Hematological:  Negative for adenopathy. Does not bruise/bleed easily. Psychiatric/Behavioral:  Negative for confusion and hallucinations. Objective:     Physical Exam  Constitutional:       General: He is not in acute distress.      Appearance: He is well-developed. HENT:      Head: Normocephalic and atraumatic. Eyes:      General: No scleral icterus. Right eye: No discharge. Left eye: No discharge. Pupils: Pupils are equal, round, and reactive to light. Neck:      Thyroid: No thyromegaly. Vascular: No JVD. Cardiovascular:      Rate and Rhythm: Normal rate and regular rhythm. Heart sounds: Normal heart sounds. No murmur heard. Pulmonary:      Effort: Pulmonary effort is normal. No respiratory distress. Breath sounds: Normal breath sounds. No wheezing or rales. Abdominal:      General: Bowel sounds are normal. There is no distension. Palpations: Abdomen is soft. There is no mass. Tenderness: There is no abdominal tenderness. There is no guarding or rebound. Musculoskeletal:         General: No tenderness. Normal range of motion. Cervical back: Normal range of motion and neck supple. Skin:     General: Skin is warm and dry. Findings: No erythema or rash. Neurological:      Mental Status: He is alert and oriented to person, place, and time. Cranial Nerves: No cranial nerve deficit. Coordination: Coordination normal.      Deep Tendon Reflexes: Reflexes are normal and symmetric. Reflexes normal.   Psychiatric:         Mood and Affect: Mood is not depressed. Behavior: Behavior normal.         Thought Content: Thought content normal.         Judgment: Judgment normal.     /64   Pulse 60   Temp 99.2 °F (37.3 °C)   SpO2 97%           Assessment:      Problem List       Acute intractable headache     We will get his MRI moved up today in light of the recent CVA. Also been given Toradol and Phenergan.           Relevant Medications    ibuprofen (ADVIL;MOTRIN) 800 MG tablet    aspirin 81 MG EC tablet    oxyCODONE-acetaminophen (PERCOCET) 7.5-325 MG per tablet    gabapentin (NEURONTIN) 300 MG capsule    celecoxib (CELEBREX) 100 MG capsule    ketorolac (TORADOL) injection 60 mg (Start on 3/6/2023  2:45 PM)    Cerebral infarct [facility])     Recent;               Plan:        Patient given educational materials - see patient instructions. Discussed use, benefit, and side effects of prescribed medications. Allpatient questions answered. Pt voiced understanding. Reviewed health maintenance. Instructed to continue current medications, diet and exercise. Patient agreed with treatment plan. Follow up as directed. MEDICATIONS:  Orders Placed This Encounter   Medications    ketorolac (TORADOL) injection 60 mg    promethazine (PHENERGAN) injection 25 mg         ORDERS:  No orders of the defined types were placed in this encounter. Follow-up:  No follow-ups on file. PATIENT INSTRUCTIONS:  Patient Instructions   1. Recent CVA with new onset headache we will get an MRI today  Electronically signed by ALLYN Contreras on 3/6/2023 at 2:30 PM    @    [employer]/transcription disclaimer:  Much of this encounter note is electronic transcription/translation of spoken language to printed texts. The electronic translation of spoken language may be erroneous, or at times,nonsensical words or phrases may be inadvertently transcribed.   Although I have reviewed the note for such errors, some may still exist.

## 2023-03-06 NOTE — ASSESSMENT & PLAN NOTE
We will get his MRI moved up today in light of the recent CVA. Also been given Toradol and Phenergan.

## 2023-03-07 ENCOUNTER — TELEPHONE (OUTPATIENT)
Dept: INTERNAL MEDICINE | Age: 78
End: 2023-03-07

## 2023-03-07 RX ORDER — METHYLPREDNISOLONE 4 MG/1
TABLET ORAL
Qty: 1 KIT | Refills: 0 | Status: SHIPPED | OUTPATIENT
Start: 2023-03-07 | End: 2023-03-13

## 2023-03-07 RX ORDER — CEFDINIR 300 MG/1
300 CAPSULE ORAL 2 TIMES DAILY
Qty: 14 CAPSULE | Refills: 0 | Status: SHIPPED | OUTPATIENT
Start: 2023-03-07 | End: 2023-03-14

## 2023-03-07 RX ORDER — KETOROLAC TROMETHAMINE 10 MG/1
10 TABLET, FILM COATED ORAL 2 TIMES DAILY
Qty: 12 TABLET | Refills: 0 | Status: SHIPPED | OUTPATIENT
Start: 2023-03-07 | End: 2024-03-06

## 2023-03-07 NOTE — TELEPHONE ENCOUNTER
Sw pt's wife she states that pt was feeling better yesterday after the shots that were given . But today pt's headaches have started back and was told to call today for medication if pt wasn't feeling any better would like something called into [facility] on [address] .

## 2023-03-17 ENCOUNTER — TELEPHONE (OUTPATIENT)
Dept: NEUROLOGY | Age: 78
End: 2023-03-17

## 2023-03-17 NOTE — TELEPHONE ENCOUNTER
Called and spoke with patients wife. Advised her of  note seen below. She voiced that he does have and appointment with cardiology and they will be keeping it. So also wanted  to know that he is still experiencing memory loss and have headaches. I explained that I would pass this on to . She had no other questions at this time. ----- Message from ALLYN Daley CNP sent at 3/17/2023 12:53 PM CDT -----  Some abnormalities in his Zio patch. I have already placed referral for cardiology for him based off of his echocardiogram.  He needs to make sure he has an appointment.

## 2023-03-20 ENCOUNTER — APPOINTMENT (OUTPATIENT)
Dept: NON INVASIVE DIAGNOSTICS | Age: 78
End: 2023-03-20
Payer: MEDICARE

## 2023-03-20 ENCOUNTER — HOSPITAL ENCOUNTER (OUTPATIENT)
Dept: NON INVASIVE DIAGNOSTICS | Age: 78
Discharge: HOME | End: 2023-03-20
Payer: MEDICARE

## 2023-03-20 ENCOUNTER — TELEPHONE (OUTPATIENT)
Dept: NEUROLOGY | Age: 78
End: 2023-03-20

## 2023-03-20 DIAGNOSIS — I65.23 BILATERAL CAROTID ARTERY STENOSIS: Primary | ICD-10-CM

## 2023-03-20 PROCEDURE — 93880 EXTRACRANIAL BILAT STUDY: CPT

## 2023-03-20 NOTE — TELEPHONE ENCOUNTER
Employee in the vascular lab called and left vmail stating they need a new order dropped for the echocardiogram due to the diagnosis code not working on the first order.  She asks that the new order has the code I65.23 for the carotid bilateral.

## 2023-03-29 ENCOUNTER — OFFICE VISIT (OUTPATIENT)
Dept: INTERNAL MEDICINE | Age: 78
End: 2023-03-29
Payer: MEDICARE

## 2023-03-29 VITALS
OXYGEN SATURATION: 97 % | HEART RATE: 76 BPM | DIASTOLIC BLOOD PRESSURE: 60 MMHG | TEMPERATURE: 99 F | SYSTOLIC BLOOD PRESSURE: 124 MMHG

## 2023-03-29 DIAGNOSIS — G44.209 ACUTE NON INTRACTABLE TENSION-TYPE HEADACHE: ICD-10-CM

## 2023-03-29 DIAGNOSIS — I10 HTN (HYPERTENSION), BENIGN: ICD-10-CM

## 2023-03-29 PROBLEM — R51.9 ACUTE HEADACHE: Status: ACTIVE | Noted: 2023-03-29

## 2023-03-29 PROCEDURE — 1123F ACP DISCUSS/DSCN MKR DOCD: CPT | Performed by: NURSE PRACTITIONER

## 2023-03-29 PROCEDURE — 99213 OFFICE O/P EST LOW 20 MIN: CPT | Performed by: NURSE PRACTITIONER

## 2023-03-29 PROCEDURE — 3074F SYST BP LT 130 MM HG: CPT | Performed by: NURSE PRACTITIONER

## 2023-03-29 PROCEDURE — G8427 DOCREV CUR MEDS BY ELIG CLIN: HCPCS | Performed by: NURSE PRACTITIONER

## 2023-03-29 PROCEDURE — 4004F PT TOBACCO SCREEN RCVD TLK: CPT | Performed by: NURSE PRACTITIONER

## 2023-03-29 PROCEDURE — G8484 FLU IMMUNIZE NO ADMIN: HCPCS | Performed by: NURSE PRACTITIONER

## 2023-03-29 PROCEDURE — 3078F DIAST BP <80 MM HG: CPT | Performed by: NURSE PRACTITIONER

## 2023-03-29 PROCEDURE — G8417 CALC BMI ABV UP PARAM F/U: HCPCS | Performed by: NURSE PRACTITIONER

## 2023-03-29 RX ORDER — TIZANIDINE 4 MG/1
4 TABLET ORAL 3 TIMES DAILY PRN
Qty: 30 TABLET | Refills: 3 | Status: SHIPPED | OUTPATIENT
Start: 2023-03-29

## 2023-03-29 ASSESSMENT — ENCOUNTER SYMPTOMS
SHORTNESS OF BREATH: 0
TROUBLE SWALLOWING: 0
STRIDOR: 0
NAUSEA: 0
COUGH: 0
CONSTIPATION: 0
VOMITING: 0
EYE ITCHING: 0
ABDOMINAL DISTENTION: 0
WHEEZING: 0
BLOOD IN STOOL: 0
EYE DISCHARGE: 0
COLOR CHANGE: 0
SORE THROAT: 0
ABDOMINAL PAIN: 0
DIARRHEA: 0
CHOKING: 0

## 2023-03-29 NOTE — ASSESSMENT & PLAN NOTE
Review of his MRI nothing abnormal.  I thought this is more of a tension headache.   We will try Aspercreme warm compresses muscle relaxants

## 2023-03-29 NOTE — PROGRESS NOTES
99.2 - 97.4 - -   Resp - - - - - -   SpO2 97 97 98 96 - -   Weight - - 206 lb 206 lb 209 lb 207 lb   Height - - 6' 0\" - 6' 0\" -   Body mass index - - 27.94 kg/m2 - 28.34 kg/m2 -   Some recent data might be hidden       Family History   Problem Relation Age of Onset    Breast Cancer Mother     Emphysema Father        Social History     Tobacco Use    Smoking status: Every Day     Packs/day: 1.00     Years: 30.00     Pack years: 30.00     Types: Cigarettes    Smokeless tobacco: Never   Substance Use Topics    Alcohol use: No      Current Outpatient Medications   Medication Sig Dispense Refill    tiZANidine (ZANAFLEX) 4 MG tablet Take 1 tablet by mouth 3 times daily as needed (nec kppain) 30 tablet 3    lovastatin (MEVACOR) 20 MG tablet TAKE 1 TABLET BY MOUTH EVERY NIGHT 90 tablet 1    lisinopril (PRINIVIL;ZESTRIL) 2.5 MG tablet TAKE 1 TABLET BY MOUTH DAILY 90 tablet 3    fluticasone (FLONASE) 50 MCG/ACT nasal spray SHAKE LIQUID AND USE 2 SPRAYS IN EACH NOSTRIL DAILY 16 g 5    ofloxacin (OCUFLOX) 0.3 % solution 4 drops to the right ear twice a day x10 days 10 mL 0    fluticasone-salmeterol (ADVAIR DISKUS) 250-50 MCG/ACT AEPB diskus inhaler INHALE 1 PUFF BY MOUTH TWICE DAILY 60 each 2    clopidogrel (PLAVIX) 75 MG tablet TAKE 1 TABLET BY MOUTH EVERY DAY 90 tablet 1    vitamin D (ERGOCALCIFEROL) 1.25 MG (36547 UT) CAPS capsule TAKE 1 CAPSULE BY MOUTH 1 TIME A WEEK 12 capsule 1    nitroGLYCERIN (NITROSTAT) 0.4 MG SL tablet Place 1 tablet under the tongue every 5 minutes as needed for Chest pain up to max of 3 total doses. If no relief after 1 dose, call 911. 25 tablet 1    gabapentin (NEURONTIN) 300 MG capsule TAKE 1 CAPSULE BY MOUTH EVERY 8 HOURS AS NEEDED  5    Cyanocobalamin (VITAMIN B 12 PO) Take 1 tablet by mouth daily      aspirin 81 MG EC tablet Take 81 mg by mouth daily      oxyCODONE-acetaminophen (PERCOCET) 7.5-325 MG per tablet Take 1 tablet by mouth 2 times daily as needed.        No current

## 2023-03-29 NOTE — PATIENT INSTRUCTIONS
Headaches;   meloxicam;  add tizanidine;  every 8 hours    Aspecreme to the neck  then heat; Then use  voltaren gel 3-4 times a day between the heat as needed     2,  Hypertension; stable for now   3.  Keep minerva with cardiology

## 2023-04-05 RX ORDER — CLOPIDOGREL BISULFATE 75 MG/1
TABLET ORAL
Qty: 90 TABLET | Refills: 1 | Status: SHIPPED | OUTPATIENT
Start: 2023-04-05

## 2023-04-05 NOTE — TELEPHONE ENCOUNTER
Ghulam Thorntoners called requesting a refill of the below medication which has been pended for you:     Requested Prescriptions     Pending Prescriptions Disp Refills    clopidogrel (PLAVIX) 75 MG tablet [Pharmacy Med Name: CLOPIDOGREL 75MG TABLETS] 90 tablet 1     Sig: TAKE 1 TABLET BY MOUTH EVERY DAY       Last Appointment Date: 3/29/2023  Next Appointment Date: 6/13/2023    No Known Allergies

## 2023-04-12 PROBLEM — H61.23 BILATERAL IMPACTED CERUMEN: Status: ACTIVE | Noted: 2022-06-20

## 2023-04-25 ENCOUNTER — OFFICE VISIT (OUTPATIENT)
Dept: CARDIOLOGY CLINIC | Age: 78
End: 2023-04-25
Payer: MEDICARE

## 2023-04-25 VITALS
HEART RATE: 67 BPM | SYSTOLIC BLOOD PRESSURE: 134 MMHG | BODY MASS INDEX: 27.63 KG/M2 | HEIGHT: 72 IN | DIASTOLIC BLOOD PRESSURE: 88 MMHG | WEIGHT: 204 LBS | OXYGEN SATURATION: 98 %

## 2023-04-25 DIAGNOSIS — I25.10 CORONARY ARTERY DISEASE INVOLVING NATIVE CORONARY ARTERY OF NATIVE HEART WITHOUT ANGINA PECTORIS: ICD-10-CM

## 2023-04-25 DIAGNOSIS — I71.40 ABDOMINAL AORTIC ANEURYSM (AAA) WITHOUT RUPTURE, UNSPECIFIED PART (HCC): ICD-10-CM

## 2023-04-25 DIAGNOSIS — I63.9 CEREBROVASCULAR ACCIDENT (CVA), UNSPECIFIED MECHANISM (HCC): ICD-10-CM

## 2023-04-25 DIAGNOSIS — I25.10 CHRONIC CORONARY ARTERY DISEASE: Primary | ICD-10-CM

## 2023-04-25 PROCEDURE — G8427 DOCREV CUR MEDS BY ELIG CLIN: HCPCS | Performed by: INTERNAL MEDICINE

## 2023-04-25 PROCEDURE — 93000 ELECTROCARDIOGRAM COMPLETE: CPT | Performed by: INTERNAL MEDICINE

## 2023-04-25 PROCEDURE — 99204 OFFICE O/P NEW MOD 45 MIN: CPT | Performed by: INTERNAL MEDICINE

## 2023-04-25 PROCEDURE — 3075F SYST BP GE 130 - 139MM HG: CPT | Performed by: INTERNAL MEDICINE

## 2023-04-25 PROCEDURE — 3079F DIAST BP 80-89 MM HG: CPT | Performed by: INTERNAL MEDICINE

## 2023-04-25 PROCEDURE — G8417 CALC BMI ABV UP PARAM F/U: HCPCS | Performed by: INTERNAL MEDICINE

## 2023-04-25 RX ORDER — NITROGLYCERIN 0.4 MG/1
0.4 TABLET SUBLINGUAL EVERY 5 MIN PRN
Qty: 25 TABLET | Refills: 1 | Status: SHIPPED | OUTPATIENT
Start: 2023-04-25

## 2023-04-25 ASSESSMENT — ENCOUNTER SYMPTOMS
CHEST TIGHTNESS: 0
SORE THROAT: 0
FACIAL SWELLING: 0
EYE DISCHARGE: 0
APNEA: 0
EYE PAIN: 0
NAUSEA: 0
ABDOMINAL DISTENTION: 0
SHORTNESS OF BREATH: 0
COUGH: 0
ABDOMINAL PAIN: 0
VOMITING: 0
WHEEZING: 0
EYE REDNESS: 0
DIARRHEA: 0
BLOOD IN STOOL: 0
CONSTIPATION: 0

## 2023-04-25 NOTE — PROGRESS NOTES
erythema or rash. Neurological:      General: No focal deficit present. Mental Status: He is alert and oriented to person, place, and time. Mental status is at baseline. Psychiatric:         Mood and Affect: Mood normal.         Behavior: Behavior normal.         Thought Content: Thought content normal.         Labs:   CBC: No results found for: CBC   BMP: No results found for: BMP    BNP: No results found for: BNPINT   PT/INR: No results found for: PROTIME, INR, INR    APTT:  No results found for: APTT   CARDIAC ENZYMES: No results found for: CKTOTAL, CKMB, CKMBINDEX, TROPONINI   LIPID PANEL: No results found for: LIPIDPAN  LIVER PROFILE:   AST   Date Value Ref Range Status   02/14/2023 14 5 - 40 U/L Final     ALT   Date Value Ref Range Status   02/14/2023 14 5 - 41 U/L Final     Albumin   Date Value Ref Range Status   02/14/2023 4.1 3.5 - 5.2 g/dL Final            ASSESSMENT and PLAN    Status post recent bihemispheric CVA, while on aspirin and Plavix, possible embolic  Bilateral carotid artery ultrasound, mild nonobstructive disease  2D echocardiogram, normal ejection fraction, positive bubble study  Recommend transesophageal echocardiogram with Doppler for further evaluation of possible intracardiac source of thromboembolism. Findings of PFO/ASD noted however need to exclude possible intracardiac masses/ thrombus in patient with multiple comorbidities  2-week cardiac monitor negative for A-fib/atrial flutter. Considering possible loop recorder for further evaluation. Will defer until findings of SHERRY reviewed.   Further recommendations will follow    Paroxysmal supraventricular tachycardia  Rare nonsustained VT  Last labs including CMP and TSH within normal limits  Nuclear medicine stress test (patient unable to exercise)    Essential hypertension  Goal blood pressure less than 130/80  Continue lisinopril    Dyslipidemia  Continue lovastatin  LFT and lipid monitoring as per PCP        Electronically

## 2023-05-01 RX ORDER — ERGOCALCIFEROL 1.25 MG/1
CAPSULE ORAL
Qty: 12 CAPSULE | Refills: 1 | Status: SHIPPED | OUTPATIENT
Start: 2023-05-01

## 2023-05-04 ENCOUNTER — TELEPHONE (OUTPATIENT)
Dept: SURGERY | Age: 78
End: 2023-05-04

## 2023-05-04 NOTE — TELEPHONE ENCOUNTER
[facility] and [address] [facility] located on the first floor of [place] [address] through hospital main entrance and turn immediately to your left. Procedure Date: 05/24/23  Procedure Arrival Time: 11:00AM  Procedure Start Time: 1:00PM   ( Times are subject to change)        Procedure Instructions:   1) You will need to not have anything to eat or drink the morning before the procedure. 2) You may stay overnight for observation. 3) You can still take all of your morning medication with a sip of water   4) You will need a  for the procedure. Discussed the above instructions with Radha Skaggs, Pt's wife, who voiced her understanding.

## 2023-05-04 NOTE — TELEPHONE ENCOUNTER
Pt spouse called to follow up n a referral that was to be set up for a SHERRY scan? Please call patient spouse to let her know if this has happened. Thanks !

## 2023-05-08 RX ORDER — TIZANIDINE 4 MG/1
TABLET ORAL
Qty: 30 TABLET | Refills: 3 | Status: SHIPPED | OUTPATIENT
Start: 2023-05-08

## 2023-05-11 RX ORDER — FLUTICASONE PROPIONATE 50 MCG
SPRAY, SUSPENSION (ML) NASAL
Qty: 16 G | Refills: 5 | Status: SHIPPED | OUTPATIENT
Start: 2023-05-11

## 2023-05-18 ENCOUNTER — OFFICE VISIT (OUTPATIENT)
Dept: NEUROLOGY | Age: 78
End: 2023-05-18
Payer: MEDICARE

## 2023-05-18 VITALS
WEIGHT: 204 LBS | DIASTOLIC BLOOD PRESSURE: 77 MMHG | HEART RATE: 58 BPM | OXYGEN SATURATION: 97 % | BODY MASS INDEX: 27.63 KG/M2 | HEIGHT: 72 IN | SYSTOLIC BLOOD PRESSURE: 137 MMHG

## 2023-05-18 DIAGNOSIS — H53.2 DIPLOPIA: ICD-10-CM

## 2023-05-18 DIAGNOSIS — I67.9 CEREBROVASCULAR SMALL VESSEL DISEASE: ICD-10-CM

## 2023-05-18 DIAGNOSIS — R41.3 MEMORY IMPAIRMENT: ICD-10-CM

## 2023-05-18 DIAGNOSIS — I63.81 THALAMIC STROKE (HCC): Primary | ICD-10-CM

## 2023-05-18 PROCEDURE — 4004F PT TOBACCO SCREEN RCVD TLK: CPT | Performed by: NURSE PRACTITIONER

## 2023-05-18 PROCEDURE — 3078F DIAST BP <80 MM HG: CPT | Performed by: NURSE PRACTITIONER

## 2023-05-18 PROCEDURE — 1123F ACP DISCUSS/DSCN MKR DOCD: CPT | Performed by: NURSE PRACTITIONER

## 2023-05-18 PROCEDURE — 3074F SYST BP LT 130 MM HG: CPT | Performed by: NURSE PRACTITIONER

## 2023-05-18 PROCEDURE — G8427 DOCREV CUR MEDS BY ELIG CLIN: HCPCS | Performed by: NURSE PRACTITIONER

## 2023-05-18 PROCEDURE — G8417 CALC BMI ABV UP PARAM F/U: HCPCS | Performed by: NURSE PRACTITIONER

## 2023-05-18 PROCEDURE — 99214 OFFICE O/P EST MOD 30 MIN: CPT | Performed by: NURSE PRACTITIONER

## 2023-05-18 RX ORDER — MELOXICAM 15 MG/1
15 TABLET ORAL DAILY
COMMUNITY
Start: 2023-04-19

## 2023-05-18 NOTE — PROGRESS NOTES
[facility] Office Note      Patient:   Bindu Garcia  MR#:    [id number]  Account Number:                         YOB: 1945  Date of Evaluation:  5/19/2023  Time of Note:                          8:18 AM  Primary/Referring Physician:  ALLYN Ledesma   Consulting Physician:  ALLYN Garcia    Follow Up      Chief Complaint   Patient presents with    Follow-up     Memory impairment        HISTORY OF PRESENT ILLNESS    Bindu Garcia is a 77y.o. year old male here for evaluation of ongoing memory loss and CVA. Here today with wife. No new strokelike symptoms. There is ongoing memory loss and generalized lack of interest in things. He does note he is having intermittent posterior headaches with pains to left shoulder blade that radiated to back. Sometimes head pain is severe enough that he has to lay down. Denies photophobia, phonophobia, nausea or vomiting. No prior history of headaches. There is also ongoing double vision as well, this is intermittent. On 2/11 he had episode of acute confusion without associated weakness, trouble with speech, vision change, gait change, facial droop. Was seen by PCP the following Tuesday and CT head noted acute CVA. Following this there have been ongoing short-term memory issues, no clear overt progression. There is word finding issues and repetition of questions. No issues with mood. Feels he is more quiet overall. Denies hallucinations. Appetite is decreased. Sleep is typically normal, wife does relate recently had a night where he was restless and had dreams through the night. He does not recall this. No falls. No medicine changes. Uses pill organizer for medicines, he is doing medicines without issues. He has had previous MRI with old CVA noted about 5 years ago. Family history of memory loss with brother. No known history of atrial fibrillation. Is on Plavix and ASA.   MRI brain, ultrasound carotids, echocardiogram, and Zio patch as

## 2023-05-18 NOTE — PROGRESS NOTES
REVIEW OF SYSTEMS    Constitutional: []Fever []Sweat []Chills [] Recent Injury [x] Denies all unless marked  HEENT:[x]Headache  [] Head Injury/Hearing Loss  [] Sore Throat  [] Ear Ache/Dizziness  [x] Denies all unless marked  Spine:  [] Neck pain  [x] Back pain  [] Sciaticia  [x] Denies all unless marked  Cardiovascular:[]Heart Disease []Chest Pain [] Palpitations  [x] Denies all unless marked  Pulmonary: []Shortness of Breath []Cough   [x] Denies all unless marke  Gastrointestinal: []Nausea  []Vomiting  []Abdominal Pain  []Constipation  []Diarrhea  []Dark Bloody Stools  [x] Denies all unless marked  Psychiatric/Behavioral:[] Depression [] Anxiety [x] Denies all unless marked  Genitourinary:   [] Frequency  [] Urgency  [] Incontinence [] Pain with Urination  [x] Denies all unless marked  Extremities: []Pain  []Swelling  [x] Denies all unless marked  Musculoskeletal: [x] Muscle Pain  [x] Joint Pain  [] Arthritis [] Muscle Cramps [] Muscle Twitches  [x] Denies all unless marked  Sleep: [] Insomnia [] Snoring [] Restless Legs [] Sleep Apnea  [] Daytime Sleepiness  [x] Denies all unless marked  Skin:[] Rash [] Skin Discoloration [x] Denies all unless marked   Neurological: [x]Visual Disturbance/Memory Loss [] Loss of Balance [] Slurred Speech/Weakness [] Seizures  [] Vertigo/Dizziness [x] Denies all unless marked

## 2023-05-24 ENCOUNTER — HOSPITAL ENCOUNTER (OUTPATIENT)
Dept: CARDIAC CATH/INVASIVE PROCEDURES | Age: 78
Discharge: HOME HEALTH | End: 2023-05-24
Attending: INTERNAL MEDICINE | Admitting: INTERNAL MEDICINE
Payer: MEDICARE

## 2023-05-24 ENCOUNTER — ANESTHESIA (OUTPATIENT)
Dept: CARDIAC CATH/INVASIVE PROCEDURES | Age: 78
End: 2023-05-24
Payer: MEDICARE

## 2023-05-24 ENCOUNTER — ANESTHESIA EVENT (OUTPATIENT)
Dept: CARDIAC CATH/INVASIVE PROCEDURES | Age: 78
End: 2023-05-24
Payer: MEDICARE

## 2023-05-24 VITALS
WEIGHT: 204 LBS | HEIGHT: 72 IN | BODY MASS INDEX: 27.63 KG/M2 | TEMPERATURE: 97.5 F | OXYGEN SATURATION: 97 % | HEART RATE: 92 BPM | DIASTOLIC BLOOD PRESSURE: 68 MMHG | RESPIRATION RATE: 18 BRPM | SYSTOLIC BLOOD PRESSURE: 135 MMHG

## 2023-05-24 LAB
LV EF: 58 %
LVEF MODALITY: NORMAL

## 2023-05-24 PROCEDURE — 33285 INSJ SUBQ CAR RHYTHM MNTR: CPT

## 2023-05-24 PROCEDURE — 2500000003 HC RX 250 WO HCPCS: Performed by: NURSE ANESTHETIST, CERTIFIED REGISTERED

## 2023-05-24 PROCEDURE — 6360000002 HC RX W HCPCS: Performed by: NURSE ANESTHETIST, CERTIFIED REGISTERED

## 2023-05-24 PROCEDURE — 3700000000 HC ANESTHESIA ATTENDED CARE

## 2023-05-24 PROCEDURE — 93321 DOPPLER ECHO F-UP/LMTD STD: CPT

## 2023-05-24 PROCEDURE — 93325 DOPPLER ECHO COLOR FLOW MAPG: CPT

## 2023-05-24 PROCEDURE — 2500000003 HC RX 250 WO HCPCS

## 2023-05-24 PROCEDURE — 2580000003 HC RX 258: Performed by: INTERNAL MEDICINE

## 2023-05-24 PROCEDURE — 3700000001 HC ADD 15 MINUTES (ANESTHESIA)

## 2023-05-24 PROCEDURE — C1764 EVENT RECORDER, CARDIAC: HCPCS

## 2023-05-24 PROCEDURE — 6370000000 HC RX 637 (ALT 250 FOR IP): Performed by: NURSE ANESTHETIST, CERTIFIED REGISTERED

## 2023-05-24 PROCEDURE — 93312 ECHO TRANSESOPHAGEAL: CPT

## 2023-05-24 PROCEDURE — 6370000000 HC RX 637 (ALT 250 FOR IP)

## 2023-05-24 RX ORDER — SODIUM CHLORIDE 0.9 % (FLUSH) 0.9 %
5-40 SYRINGE (ML) INJECTION PRN
Status: DISCONTINUED | OUTPATIENT
Start: 2023-05-24 | End: 2023-05-24 | Stop reason: HOSPADM

## 2023-05-24 RX ORDER — SODIUM CHLORIDE 0.9 % (FLUSH) 0.9 %
5-40 SYRINGE (ML) INJECTION EVERY 12 HOURS SCHEDULED
OUTPATIENT
Start: 2023-05-24

## 2023-05-24 RX ORDER — PROPOFOL 10 MG/ML
INJECTION, EMULSION INTRAVENOUS PRN
Status: DISCONTINUED | OUTPATIENT
Start: 2023-05-24 | End: 2023-05-24 | Stop reason: SDUPTHER

## 2023-05-24 RX ORDER — LIDOCAINE HYDROCHLORIDE 10 MG/ML
1 INJECTION, SOLUTION EPIDURAL; INFILTRATION; INTRACAUDAL; PERINEURAL
Status: DISCONTINUED | OUTPATIENT
Start: 2023-05-24 | End: 2023-05-24 | Stop reason: HOSPADM

## 2023-05-24 RX ORDER — SODIUM CHLORIDE 0.9 % (FLUSH) 0.9 %
5-40 SYRINGE (ML) INJECTION EVERY 12 HOURS SCHEDULED
Status: DISCONTINUED | OUTPATIENT
Start: 2023-05-24 | End: 2023-05-24 | Stop reason: HOSPADM

## 2023-05-24 RX ORDER — MORPHINE SULFATE 2 MG/ML
2 INJECTION, SOLUTION INTRAMUSCULAR; INTRAVENOUS EVERY 5 MIN PRN
OUTPATIENT
Start: 2023-05-24

## 2023-05-24 RX ORDER — MEPERIDINE HYDROCHLORIDE 25 MG/ML
12.5 INJECTION INTRAMUSCULAR; INTRAVENOUS; SUBCUTANEOUS EVERY 5 MIN PRN
OUTPATIENT
Start: 2023-05-24

## 2023-05-24 RX ORDER — LORAZEPAM 2 MG/ML
0.5 INJECTION INTRAMUSCULAR
OUTPATIENT
Start: 2023-05-24 | End: 2023-05-25

## 2023-05-24 RX ORDER — SODIUM CHLORIDE 9 MG/ML
INJECTION, SOLUTION INTRAVENOUS CONTINUOUS
Status: DISCONTINUED | OUTPATIENT
Start: 2023-05-24 | End: 2023-05-24 | Stop reason: HOSPADM

## 2023-05-24 RX ORDER — MORPHINE SULFATE 4 MG/ML
4 INJECTION, SOLUTION INTRAMUSCULAR; INTRAVENOUS EVERY 5 MIN PRN
OUTPATIENT
Start: 2023-05-24

## 2023-05-24 RX ORDER — LABETALOL HYDROCHLORIDE 5 MG/ML
10 INJECTION, SOLUTION INTRAVENOUS
OUTPATIENT
Start: 2023-05-24

## 2023-05-24 RX ORDER — SODIUM CHLORIDE 9 MG/ML
INJECTION, SOLUTION INTRAVENOUS PRN
OUTPATIENT
Start: 2023-05-24

## 2023-05-24 RX ORDER — EPHEDRINE SULFATE 50 MG/ML
INJECTION, SOLUTION INTRAVENOUS PRN
Status: DISCONTINUED | OUTPATIENT
Start: 2023-05-24 | End: 2023-05-24 | Stop reason: SDUPTHER

## 2023-05-24 RX ORDER — MIDAZOLAM HYDROCHLORIDE 2 MG/2ML
2 INJECTION, SOLUTION INTRAMUSCULAR; INTRAVENOUS
Status: DISCONTINUED | OUTPATIENT
Start: 2023-05-24 | End: 2023-05-24 | Stop reason: HOSPADM

## 2023-05-24 RX ORDER — GLYCOPYRROLATE 0.2 MG/ML
INJECTION INTRAMUSCULAR; INTRAVENOUS PRN
Status: DISCONTINUED | OUTPATIENT
Start: 2023-05-24 | End: 2023-05-24 | Stop reason: SDUPTHER

## 2023-05-24 RX ORDER — SODIUM CHLORIDE 0.9 % (FLUSH) 0.9 %
5-40 SYRINGE (ML) INJECTION PRN
OUTPATIENT
Start: 2023-05-24

## 2023-05-24 RX ORDER — SCOLOPAMINE TRANSDERMAL SYSTEM 1 MG/1
1 PATCH, EXTENDED RELEASE TRANSDERMAL
Status: DISCONTINUED | OUTPATIENT
Start: 2023-05-24 | End: 2023-05-24 | Stop reason: HOSPADM

## 2023-05-24 RX ORDER — DROPERIDOL 2.5 MG/ML
0.62 INJECTION, SOLUTION INTRAMUSCULAR; INTRAVENOUS
OUTPATIENT
Start: 2023-05-24 | End: 2023-05-25

## 2023-05-24 RX ORDER — LIDOCAINE HYDROCHLORIDE 10 MG/ML
INJECTION, SOLUTION INFILTRATION; PERINEURAL PRN
Status: DISCONTINUED | OUTPATIENT
Start: 2023-05-24 | End: 2023-05-24 | Stop reason: SDUPTHER

## 2023-05-24 RX ORDER — FAMOTIDINE 20 MG/1
20 TABLET, FILM COATED ORAL ONCE
Status: DISCONTINUED | OUTPATIENT
Start: 2023-05-24 | End: 2023-05-24 | Stop reason: HOSPADM

## 2023-05-24 RX ORDER — METOCLOPRAMIDE HYDROCHLORIDE 5 MG/ML
10 INJECTION INTRAMUSCULAR; INTRAVENOUS
OUTPATIENT
Start: 2023-05-24 | End: 2023-05-25

## 2023-05-24 RX ORDER — DIPHENHYDRAMINE HYDROCHLORIDE 50 MG/ML
12.5 INJECTION INTRAMUSCULAR; INTRAVENOUS
OUTPATIENT
Start: 2023-05-24 | End: 2023-05-25

## 2023-05-24 RX ORDER — IPRATROPIUM BROMIDE AND ALBUTEROL SULFATE 2.5; .5 MG/3ML; MG/3ML
1 SOLUTION RESPIRATORY (INHALATION)
OUTPATIENT
Start: 2023-05-24 | End: 2023-05-25

## 2023-05-24 RX ORDER — SODIUM CHLORIDE 9 MG/ML
INJECTION, SOLUTION INTRAVENOUS PRN
Status: DISCONTINUED | OUTPATIENT
Start: 2023-05-24 | End: 2023-05-24 | Stop reason: HOSPADM

## 2023-05-24 RX ADMIN — GLYCOPYRROLATE 0.2 MG: 0.2 INJECTION, SOLUTION INTRAMUSCULAR; INTRAVENOUS at 14:02

## 2023-05-24 RX ADMIN — TOPICAL ANESTHETIC 1 EACH: 200 SPRAY DENTAL; PERIODONTAL at 13:38

## 2023-05-24 RX ADMIN — PHENYLEPHRINE HYDROCHLORIDE 100 MCG: 10 INJECTION INTRAVENOUS at 13:52

## 2023-05-24 RX ADMIN — PROPOFOL 200 MG: 10 INJECTION, EMULSION INTRAVENOUS at 13:42

## 2023-05-24 RX ADMIN — PHENYLEPHRINE HYDROCHLORIDE 100 MCG: 10 INJECTION INTRAVENOUS at 13:59

## 2023-05-24 RX ADMIN — EPHEDRINE SULFATE 10 MG: 50 INJECTION INTRAMUSCULAR; INTRAVENOUS; SUBCUTANEOUS at 14:07

## 2023-05-24 RX ADMIN — SODIUM CHLORIDE: 9 INJECTION, SOLUTION INTRAVENOUS at 11:47

## 2023-05-24 RX ADMIN — LIDOCAINE HYDROCHLORIDE 50 MG: 10 INJECTION, SOLUTION INFILTRATION; PERINEURAL at 13:42

## 2023-05-24 ASSESSMENT — ENCOUNTER SYMPTOMS: SHORTNESS OF BREATH: 0

## 2023-05-24 ASSESSMENT — LIFESTYLE VARIABLES: SMOKING_STATUS: 0

## 2023-05-24 NOTE — PROCEDURES
Patient Name: Malinda Joseph  Attending Provider: Elizabeth Richmond MD  Admit Date: [unfilled]  MRN: [id number]  Account: [de-identified]  : 1945    Procedure: Loop recorder implant    Indication: Cryptogenic stroke    Anesthesia: 1% Xylocaine without epinephrine, IV sedation    Complications: None    Blood loss: Minimal    Manufacture device implanted:   [employer]tronic model number:  Nuha Luque serial number:   [id number]    Report: After informed consent was obtained the patient was brought to the catheterization area where the chest was prepped and draped using sterile Betadine solution. After adequate intravenous sedation was achieved 1% Xylocaine with epinephrine was used to achieve local anesthesia. A stab incision was made over the left fourth intercostal space and using the tunneling device provided a tract was created from the incision and directed towards the apex. The loop recorder device was then injected subcutaneously and positioned appropriately. Tolerated uneventfully. The incision was then closed using 4-0 Vicryl. Return to their room in stable condition. An independent trained observer administered medications under my direction. The patient was continuously monitored with respect to level of consciousness, and vital signs/physiologic status throughout the case. For further details regarding specific medications and doses please refer to Cath Lab procedural notes.       Anesthesia start time:  Anesthesia stop time:      Chito Dunn MD

## 2023-05-24 NOTE — ANESTHESIA PRE PROCEDURE
Department of Anesthesiology  Preprocedure Note       Name:  Esme Boles   Age:  77 y.o.  :  1945                                          MRN:  [id number]         Date:  2023      Surgeon: * Surgery not found *    Procedure:     Medications prior to admission:   Prior to Admission medications    Medication Sig Start Date End Date Taking? Authorizing Provider   meloxicam (MOBIC) 15 MG tablet Take 1 tablet by mouth daily 23   Historical Provider, MD   fluticasone (FLONASE) 50 MCG/ACT nasal spray SHAKE LIQUID AND USE 2 SPRAYS IN EACH NOSTRIL DAILY 23   ALLYN Betancourt   tiZANidine (ZANAFLEX) 4 MG tablet TAKE 1 TABLET BY MOUTH THREE TIMES DAILY AS NEEDED 23   ALLYN Betancourt   vitamin D (ERGOCALCIFEROL) 1.25 MG (45078 UT) CAPS capsule TAKE 1 CAPSULE BY MOUTH 1 TIME A WEEK 23   ALLYN Betancourt   nitroGLYCERIN (NITROSTAT) 0.4 MG SL tablet Place 1 tablet under the tongue every 5 minutes as needed for Chest pain up to max of 3 total doses. If no relief after 1 dose, call 911. 23   Lázaro Laguna MD   clopidogrel (PLAVIX) 75 MG tablet TAKE 1 TABLET BY MOUTH EVERY DAY 23   ALLYN Betancourt   lovastatin (MEVACOR) 20 MG tablet TAKE 1 TABLET BY MOUTH EVERY NIGHT 23   ALLYN Betancourt   lisinopril (PRINIVIL;ZESTRIL) 2.5 MG tablet TAKE 1 TABLET BY MOUTH DAILY 22   ALLYN Betancourt   fluticasone-salmeterol (ADVAIR DISKUS) 250-50 MCG/ACT AEPB diskus inhaler INHALE 1 PUFF BY MOUTH TWICE DAILY  Patient taking differently: 1 puff 2 times daily INHALE 1 PUFF BY MOUTH TWICE DAILY 10/3/22   ALLYN Betancourt   gabapentin (NEURONTIN) 300 MG capsule TAKE 1 CAPSULE BY MOUTH EVERY 8 HOURS AS NEEDED 19   Historical Provider, MD   aspirin 81 MG EC tablet Take 1 tablet by mouth daily    Historical Provider, MD   oxyCODONE-acetaminophen (PERCOCET) 7.5-325 MG per tablet Take 1 tablet by mouth 2 times daily as needed.  17   Historical Provider, MD

## 2023-05-24 NOTE — ANESTHESIA POSTPROCEDURE EVALUATION
Department of Anesthesiology  Postprocedure Note    Patient: Vikash Barrientos  MRN: [id number]  YOB: 1945  Date of evaluation: 5/24/2023      Procedure Summary     Date: 05/24/23 Room / Location: [facility] CATH LAB    Anesthesia Start: 1330 Anesthesia Stop: 9591    Procedure: SHERRY W ANESTHESIA Diagnosis:       Abnormal echocardiogram      Cerebral infarction, unspecified    Scheduled Providers:  Responsible Provider: ALLYN Thakkar CRNA    Anesthesia Type: general ASA Status: 3          Anesthesia Type: No value filed.     Oziel Phase I:      Oziel Phase II:        Anesthesia Post Evaluation    Patient location during evaluation: bedside  Patient participation: complete - patient participated  Level of consciousness: awake and alert  Pain score: 0  Airway patency: patent  Nausea & Vomiting: no nausea and no vomiting  Complications: no  Cardiovascular status: hemodynamically stable and blood pressure returned to baseline  Respiratory status: acceptable, spontaneous ventilation, nasal cannula and nonlabored ventilation  Hydration status: stable  Comments: BP (!) 96/56   Pulse 86   Temp 97.5 °F (36.4 °C) (Skin)   Resp 23   Ht 6' (1.829 m)   Wt 204 lb (92.5 kg)   SpO2 99%   BMI 27.67 kg/m²

## 2023-05-24 NOTE — PROCEDURES
Patient Name: Ondina Be    Medical Record Number: [id number]  Date: 5/24/2023     Performing Cardiologist: Franco Ortiz   Procedure Start Time: 7651      Procedure End Time: 1       Linq Nursing Procedure Note    Indications: Cryptogenic Stroke    Consent: The patient was counseled regarding the procedure, its indications, risks, potential complications and alternatives, and any questions were answered. Consent was obtained to proceed. The patient was brought to [facility] Room: 11. Time Out Completed: All team members present. Correct patient, correct procedure, correct procedure site, and correct position verified. Allergies reviewed. Pertinent diagnostic results reviewed. Pre-Medication:  None    Procedure:  Ondina Be was supine. Site was marked pre-procedure. 20 mL Lidocaine with Epi injected subcutaneous by MD. A small incision was made and loop placed. Sutures and Steri-Strips placed by Jonny Steward. Dressing placed / Dry and Intact. Vital signs monitored throughout procedure  and remained stable . The patient tolerated the procedure well. Complications: None    Post procedure: Patient taken to  [facility] Holding Room # 6  in stable condition and report given.

## 2023-05-24 NOTE — PROCEDURES
Patient Name: Mariela Castañeda    Medical Record Number: [id number]  Date: 5/24/2023     Performing Cardiologist: Patricia Hernandez  Procedure Start Time: 3567     Procedure End Time: 6588       SHERRY Procedure Note    Indications: CVA    Consent: The patient was counseled regarding the procedure, its indications, risks, potential complications and alternatives, and any questions were answered. Consent was obtained to proceed. The patient was brought to CVI Room: 11. Time Out Completed: All team members present. Correct patient, correct procedure, correct procedure site, and correct position verified. Allergies reviewed. Pertinent diagnostic results reviewed. Pre-Medication: See Anesthesia Record    Procedure:  Mariela Castañeda was turned on left side. A bite block was inserted to facilitate passage of the HSERRY probe. The probe was inserted without difficulty and images acquired. Saline Bubble study was performed. Vital signs monitored throughout procedure See Anesthesia Record. Probe and bite block removed post procedure. The patient tolerated the procedure well. Complications: None    Post procedure: Patient taken to  [facility] Holding Room # 7  in stable condition and report given.

## 2023-05-24 NOTE — PROGRESS NOTES
Discharge instructions reviewed with patient and patient states understanding. Patient discharged from cath holding with all belongings and AVS. AMBULATED WITH PT TO CAR FOR DISCHARGE HOME PER WIFE.

## 2023-05-24 NOTE — PROGRESS NOTES
PT OOB WITH RN @ SIDE. PT AMBULATED TO BATHROOM TO VOID, THEN BACK TO ROOM TO REDRESS. WIFE ASSISTING WITH REDRESSING.

## 2023-05-31 ENCOUNTER — NURSE ONLY (OUTPATIENT)
Dept: CARDIOLOGY CLINIC | Age: 78
End: 2023-05-31

## 2023-05-31 DIAGNOSIS — Z51.89 VISIT FOR WOUND CHECK: Primary | ICD-10-CM

## 2023-05-31 PROCEDURE — 99024 POSTOP FOLLOW-UP VISIT: CPT | Performed by: CLINICAL NURSE SPECIALIST

## 2023-05-31 NOTE — PROGRESS NOTES
Patient here for a wound check visit status post LINQ implant. Incision dry and intact. No redness, swelling, or drainage noted  Edges well approximated  Instructed patient to wash area with antibacterial soap and keep it clean and dry. Reviewed discharged instructions and questions answered.   Reviewed [facility] home monitor and patient verbalized understanding  Follow up as scheduled

## 2023-06-07 DIAGNOSIS — E78.00 PURE HYPERCHOLESTEROLEMIA: ICD-10-CM

## 2023-06-07 DIAGNOSIS — Z12.5 ENCOUNTER FOR PROSTATE CANCER SCREENING: ICD-10-CM

## 2023-06-07 DIAGNOSIS — I10 HTN (HYPERTENSION), BENIGN: ICD-10-CM

## 2023-06-07 DIAGNOSIS — E55.9 VITAMIN D DEFICIENCY: ICD-10-CM

## 2023-06-07 LAB
25(OH)D3 SERPL-MCNC: 59.1 NG/ML
ALBUMIN SERPL-MCNC: 4.5 G/DL (ref 3.5–5.2)
ALP SERPL-CCNC: 65 U/L (ref 40–130)
ALT SERPL-CCNC: 13 U/L (ref 5–41)
ANION GAP SERPL CALCULATED.3IONS-SCNC: 10 MMOL/L (ref 7–19)
AST SERPL-CCNC: 16 U/L (ref 5–40)
BASOPHILS # BLD: 0.1 K/UL (ref 0–0.2)
BASOPHILS NFR BLD: 1 % (ref 0–1)
BILIRUB SERPL-MCNC: 1 MG/DL (ref 0.2–1.2)
BILIRUB UR QL STRIP: NEGATIVE
BUN SERPL-MCNC: 23 MG/DL (ref 8–23)
CALCIUM SERPL-MCNC: 9.9 MG/DL (ref 8.8–10.2)
CHLORIDE SERPL-SCNC: 108 MMOL/L (ref 98–111)
CHOLEST SERPL-MCNC: 105 MG/DL (ref 160–199)
CLARITY UR: CLEAR
CO2 SERPL-SCNC: 25 MMOL/L (ref 22–29)
COLOR UR: YELLOW
CREAT SERPL-MCNC: 1.1 MG/DL (ref 0.5–1.2)
EOSINOPHIL # BLD: 0.1 K/UL (ref 0–0.6)
EOSINOPHIL NFR BLD: 2.1 % (ref 0–5)
ERYTHROCYTE [DISTWIDTH] IN BLOOD BY AUTOMATED COUNT: 12.2 % (ref 11.5–14.5)
GLUCOSE SERPL-MCNC: 92 MG/DL (ref 74–109)
GLUCOSE UR STRIP.AUTO-MCNC: NEGATIVE MG/DL
HCT VFR BLD AUTO: 48.3 % (ref 42–52)
HDLC SERPL-MCNC: 38 MG/DL (ref 55–121)
HGB BLD-MCNC: 15.5 G/DL (ref 14–18)
HGB UR STRIP.AUTO-MCNC: NEGATIVE MG/L
IMM GRANULOCYTES # BLD: 0 K/UL
KETONES UR STRIP.AUTO-MCNC: NEGATIVE MG/DL
LDLC SERPL CALC-MCNC: 46 MG/DL
LEUKOCYTE ESTERASE UR QL STRIP.AUTO: NEGATIVE
LYMPHOCYTES # BLD: 2 K/UL (ref 1.1–4.5)
LYMPHOCYTES NFR BLD: 32.6 % (ref 20–40)
MCH RBC QN AUTO: 31 PG (ref 27–31)
MCHC RBC AUTO-ENTMCNC: 32.1 G/DL (ref 33–37)
MCV RBC AUTO: 96.6 FL (ref 80–94)
MONOCYTES # BLD: 0.4 K/UL (ref 0–0.9)
MONOCYTES NFR BLD: 6.7 % (ref 0–10)
NEUTROPHILS # BLD: 3.5 K/UL (ref 1.5–7.5)
NEUTS SEG NFR BLD: 57.4 % (ref 50–65)
NITRITE UR QL STRIP.AUTO: NEGATIVE
PH UR STRIP.AUTO: 6 [PH] (ref 5–8)
PLATELET # BLD AUTO: 209 K/UL (ref 130–400)
PMV BLD AUTO: 10.1 FL (ref 9.4–12.4)
POTASSIUM SERPL-SCNC: 4.6 MMOL/L (ref 3.5–5)
PROT SERPL-MCNC: 6.9 G/DL (ref 6.6–8.7)
PROT UR STRIP.AUTO-MCNC: ABNORMAL MG/DL
PSA SERPL-MCNC: 1.03 NG/ML (ref 0–4)
RBC # BLD AUTO: 5 M/UL (ref 4.7–6.1)
SODIUM SERPL-SCNC: 143 MMOL/L (ref 136–145)
SP GR UR STRIP.AUTO: 1.03 (ref 1–1.03)
TRIGL SERPL-MCNC: 104 MG/DL (ref 0–149)
TSH SERPL DL<=0.005 MIU/L-ACNC: 1.6 UIU/ML (ref 0.27–4.2)
UROBILINOGEN UR STRIP.AUTO-MCNC: 1 E.U./DL
WBC # BLD AUTO: 6.1 K/UL (ref 4.8–10.8)

## 2023-06-07 ASSESSMENT — ENCOUNTER SYMPTOMS
SORE THROAT: 0
ABDOMINAL PAIN: 0
FACIAL SWELLING: 0
EYE DISCHARGE: 0
WHEEZING: 0
NAUSEA: 0
ABDOMINAL DISTENTION: 0
SHORTNESS OF BREATH: 0
COUGH: 0
EYE REDNESS: 0
CONSTIPATION: 0
APNEA: 0
CHEST TIGHTNESS: 0
BLOOD IN STOOL: 0
DIARRHEA: 0
EYE PAIN: 0
VOMITING: 0

## 2023-06-13 PROBLEM — R51.9 ACUTE INTRACTABLE HEADACHE: Status: RESOLVED | Noted: 2023-03-06 | Resolved: 2023-06-13

## 2023-06-13 PROBLEM — R51.9 ACUTE HEADACHE: Status: RESOLVED | Noted: 2023-03-29 | Resolved: 2023-06-13

## 2023-06-26 DIAGNOSIS — Z95.818 IMPLANTABLE LOOP RECORDER PRESENT: Primary | ICD-10-CM

## 2023-06-26 DIAGNOSIS — I63.9 CEREBROVASCULAR ACCIDENT (CVA), UNSPECIFIED MECHANISM (HCC): ICD-10-CM

## 2023-06-26 PROCEDURE — 93298 REM INTERROG DEV EVAL SCRMS: CPT | Performed by: NURSE PRACTITIONER

## 2023-06-26 PROCEDURE — G2066 INTER DEVC REMOTE 30D: HCPCS | Performed by: NURSE PRACTITIONER

## 2023-06-28 ENCOUNTER — HOSPITAL ENCOUNTER (OUTPATIENT)
Dept: NUCLEAR MEDICINE | Age: 78
Discharge: HOME | End: 2023-06-30
Payer: MEDICARE

## 2023-06-28 DIAGNOSIS — I63.9 CEREBROVASCULAR ACCIDENT (CVA), UNSPECIFIED MECHANISM (HCC): ICD-10-CM

## 2023-06-28 DIAGNOSIS — I25.10 CHRONIC CORONARY ARTERY DISEASE: ICD-10-CM

## 2023-06-28 LAB
LV EF: 41 %
LVEF MODALITY: NORMAL

## 2023-06-28 PROCEDURE — 93018 CV STRESS TEST I&R ONLY: CPT | Performed by: INTERNAL MEDICINE

## 2023-06-28 PROCEDURE — 78452 HT MUSCLE IMAGE SPECT MULT: CPT | Performed by: INTERNAL MEDICINE

## 2023-06-28 PROCEDURE — 93016 CV STRESS TEST SUPVJ ONLY: CPT | Performed by: INTERNAL MEDICINE

## 2023-06-28 PROCEDURE — A9502 TC99M TETROFOSMIN: HCPCS | Performed by: INTERNAL MEDICINE

## 2023-06-28 PROCEDURE — 3430000000 HC RX DIAGNOSTIC RADIOPHARMACEUTICAL: Performed by: INTERNAL MEDICINE

## 2023-06-28 PROCEDURE — 93017 CV STRESS TEST TRACING ONLY: CPT

## 2023-06-28 PROCEDURE — 6360000002 HC RX W HCPCS: Performed by: INTERNAL MEDICINE

## 2023-06-28 RX ORDER — REGADENOSON 0.08 MG/ML
0.4 INJECTION, SOLUTION INTRAVENOUS
Status: COMPLETED | OUTPATIENT
Start: 2023-06-28 | End: 2023-06-28

## 2023-06-28 RX ADMIN — TETROFOSMIN 24 MILLICURIE: 1.38 INJECTION, POWDER, LYOPHILIZED, FOR SOLUTION INTRAVENOUS at 12:08

## 2023-06-28 RX ADMIN — REGADENOSON 0.4 MG: 0.08 INJECTION, SOLUTION INTRAVENOUS at 11:30

## 2023-06-28 RX ADMIN — TETROFOSMIN 8 MILLICURIE: 1.38 INJECTION, POWDER, LYOPHILIZED, FOR SOLUTION INTRAVENOUS at 10:20

## 2023-06-29 ENCOUNTER — TELEPHONE (OUTPATIENT)
Dept: CARDIOLOGY CLINIC | Age: 78
End: 2023-06-29

## 2023-06-29 RX ORDER — METOPROLOL SUCCINATE 25 MG/1
25 TABLET, EXTENDED RELEASE ORAL DAILY
Qty: 30 TABLET | Refills: 3 | Status: SHIPPED | OUTPATIENT
Start: 2023-06-29 | End: 2023-10-27

## 2023-07-12 ENCOUNTER — OFFICE VISIT (OUTPATIENT)
Dept: ENT CLINIC | Age: 78
End: 2023-07-12
Payer: MEDICARE

## 2023-07-12 VITALS
HEIGHT: 72 IN | SYSTOLIC BLOOD PRESSURE: 130 MMHG | DIASTOLIC BLOOD PRESSURE: 72 MMHG | WEIGHT: 195 LBS | BODY MASS INDEX: 26.41 KG/M2

## 2023-07-12 DIAGNOSIS — H61.23 BILATERAL IMPACTED CERUMEN: Primary | ICD-10-CM

## 2023-07-12 PROBLEM — H65.91 RIGHT NON-SUPPURATIVE OTITIS MEDIA: Status: RESOLVED | Noted: 2021-07-01 | Resolved: 2023-07-12

## 2023-07-12 PROCEDURE — 4004F PT TOBACCO SCREEN RCVD TLK: CPT | Performed by: PHYSICIAN ASSISTANT

## 2023-07-12 PROCEDURE — 3078F DIAST BP <80 MM HG: CPT | Performed by: PHYSICIAN ASSISTANT

## 2023-07-12 PROCEDURE — 3075F SYST BP GE 130 - 139MM HG: CPT | Performed by: PHYSICIAN ASSISTANT

## 2023-07-12 PROCEDURE — 99213 OFFICE O/P EST LOW 20 MIN: CPT | Performed by: PHYSICIAN ASSISTANT

## 2023-07-12 PROCEDURE — G8417 CALC BMI ABV UP PARAM F/U: HCPCS | Performed by: PHYSICIAN ASSISTANT

## 2023-07-12 PROCEDURE — G8427 DOCREV CUR MEDS BY ELIG CLIN: HCPCS | Performed by: PHYSICIAN ASSISTANT

## 2023-07-12 PROCEDURE — 1123F ACP DISCUSS/DSCN MKR DOCD: CPT | Performed by: PHYSICIAN ASSISTANT

## 2023-07-12 PROCEDURE — 69210 REMOVE IMPACTED EAR WAX UNI: CPT | Performed by: PHYSICIAN ASSISTANT

## 2023-07-12 RX ORDER — CLOTRIMAZOLE 1 G/ML
SOLUTION TOPICAL
Qty: 15 ML | Refills: 1 | Status: SHIPPED | OUTPATIENT
Start: 2023-07-12

## 2023-07-12 RX ORDER — OFLOXACIN 3 MG/ML
SOLUTION/ DROPS OPHTHALMIC
Qty: 10 ML | Refills: 0 | Status: SHIPPED | OUTPATIENT
Start: 2023-07-12

## 2023-07-12 NOTE — PROGRESS NOTES
Mr. Gina Noble is a pleasant 30-year-old  male that presents for a 3-month cerumen check. He reports that the right ear has been draining quite a bit when he takes his hearing aid out. Physical examination with the microscope revealed the patient to have evidence of otitis externa. This was suctioned down to the TM. He was noted to have some cerumen present that was also removed with suction and alligator graspers. The TM appeared to be normal on the right side. Examination of the left side demonstrated cerumen to be present that was also removed with suction and alligator graspers. After disimpaction, the patient continues with a pinpoint TM perforation centrally with no evidence of infection. Neck exam demonstrated no lymphadenopathy or thyromegaly. Oral exam was unrevealing with dentures noted to be present. Impression: Successful cerumen disimpaction bilaterally, right-sided otitis externa likely fungal from chronic moisture    Plan: I will place the patient on Lotrimin eardrops as well as ofloxacin eardrops to be used as needed for the right ear. I recommended him the instill both about once a week for preventative measures due to his hearing aid and then try to air out the ear canals much as possible. Patient is follow-up in 3 months for cerumen check. He was reminded to call if he has any questions or problems.       Electronically signed by Jm Macias PA-C on 7/12/23 at 1:33 PM CDT

## 2023-07-18 DIAGNOSIS — I63.9 CRYPTOGENIC STROKE (HCC): ICD-10-CM

## 2023-07-18 DIAGNOSIS — I63.9 CEREBROVASCULAR ACCIDENT (CVA), UNSPECIFIED MECHANISM (HCC): ICD-10-CM

## 2023-07-18 DIAGNOSIS — Z95.818 STATUS POST PLACEMENT OF IMPLANTABLE LOOP RECORDER: Primary | ICD-10-CM

## 2023-07-20 ENCOUNTER — OFFICE VISIT (OUTPATIENT)
Dept: CARDIOLOGY CLINIC | Age: 78
End: 2023-07-20
Payer: MEDICARE

## 2023-07-20 VITALS
HEIGHT: 72 IN | DIASTOLIC BLOOD PRESSURE: 62 MMHG | HEART RATE: 60 BPM | WEIGHT: 200 LBS | BODY MASS INDEX: 27.09 KG/M2 | SYSTOLIC BLOOD PRESSURE: 100 MMHG

## 2023-07-20 DIAGNOSIS — I25.10 CHRONIC CORONARY ARTERY DISEASE: Primary | ICD-10-CM

## 2023-07-20 PROCEDURE — 99214 OFFICE O/P EST MOD 30 MIN: CPT | Performed by: INTERNAL MEDICINE

## 2023-07-20 PROCEDURE — G8417 CALC BMI ABV UP PARAM F/U: HCPCS | Performed by: INTERNAL MEDICINE

## 2023-07-20 PROCEDURE — 3078F DIAST BP <80 MM HG: CPT | Performed by: INTERNAL MEDICINE

## 2023-07-20 PROCEDURE — 1123F ACP DISCUSS/DSCN MKR DOCD: CPT | Performed by: INTERNAL MEDICINE

## 2023-07-20 PROCEDURE — G8427 DOCREV CUR MEDS BY ELIG CLIN: HCPCS | Performed by: INTERNAL MEDICINE

## 2023-07-20 PROCEDURE — 3074F SYST BP LT 130 MM HG: CPT | Performed by: INTERNAL MEDICINE

## 2023-07-20 PROCEDURE — 4004F PT TOBACCO SCREEN RCVD TLK: CPT | Performed by: INTERNAL MEDICINE

## 2023-07-20 ASSESSMENT — ENCOUNTER SYMPTOMS
EYE PAIN: 0
BLOOD IN STOOL: 0
ABDOMINAL PAIN: 0
SORE THROAT: 0
CHEST TIGHTNESS: 0
WHEEZING: 0
NAUSEA: 0
EYE REDNESS: 0
DIARRHEA: 0
APNEA: 0
CONSTIPATION: 0
ABDOMINAL DISTENTION: 0
COUGH: 0
VOMITING: 0
SHORTNESS OF BREATH: 0
FACIAL SWELLING: 0
EYE DISCHARGE: 0

## 2023-07-20 NOTE — PROGRESS NOTES
Cardiology Office Visit Note  [facility] [place], [address] [address]  Phone: [phone]  Fax: [phone]                            Date:  7/20/2023  Patient: Angela King  Age:  77 y.o., 1945    Referral: No ref. provider found    REASON FOR VISIT:  Follow-up         PROBLEM LIST:    Patient Active Problem List    Diagnosis Date Noted    Cryptogenic stroke ([address]) 03/06/2023     Priority: Medium    Confusion 02/14/2023     Priority: Medium    Memory loss 02/14/2023     Priority: Medium    Leg cramps 12/13/2022     Priority: Medium    Bronchitis 10/20/2022     Priority: Medium    Chronic actinic otitis externa of right ear 10/13/2022     Priority: Medium    Flat hearing loss of left ear 07/05/2022     Priority: Medium    Perforated tympanic membrane on examination, left 07/05/2022     Priority: Medium    Bilateral impacted cerumen 06/20/2022     Priority: Medium    Acute diffuse otitis externa of right ear 06/20/2022     Priority: Medium    Gastroesophageal reflux disease without esophagitis 07/01/2021    Benign prostatic hyperplasia with urinary frequency 07/01/2021    Memory impairment 07/02/2020    Vitamin D deficiency 07/02/2020    Tobacco abuse 01/06/2020    Abdominal aortic aneurysm (AAA) without rupture ([address]) 01/06/2020     Overview Note:     2.1 from ultrasound July 2019. We will repeat July 2020.   We will refer if over 5        Primary osteoarthritis involving multiple joints 11/19/2019    HTN (hypertension), benign 01/10/2019     Overview Note:     cont BP medication the am of procedure        Chronic pain syndrome 06/06/2018    Neuropathy 06/06/2018    Cognitive complaints 02/05/2018    COPD (chronic obstructive pulmonary disease) ([address])     Chronic coronary artery disease      Overview Note:     LAD stent 3/2005 [facility]        Pure hypercholesterolemia     Mixed conductive and sensorineural hearing loss of right ear with restricted hearing of left ear 11/06/2017     Overview

## 2023-07-26 DIAGNOSIS — Z95.818 STATUS POST PLACEMENT OF IMPLANTABLE LOOP RECORDER: Primary | ICD-10-CM

## 2023-07-26 DIAGNOSIS — I63.9 CEREBROVASCULAR ACCIDENT (CVA), UNSPECIFIED MECHANISM (HCC): ICD-10-CM

## 2023-07-26 DIAGNOSIS — I63.9 CRYPTOGENIC STROKE (HCC): ICD-10-CM

## 2023-07-26 PROCEDURE — 93298 REM INTERROG DEV EVAL SCRMS: CPT | Performed by: NURSE PRACTITIONER

## 2023-07-26 PROCEDURE — G2066 INTER DEVC REMOTE 30D: HCPCS | Performed by: NURSE PRACTITIONER

## 2023-08-04 NOTE — TELEPHONE ENCOUNTER
Last OV 6/13/2023  Next OV 12/14/2023      Requested Prescriptions     Pending Prescriptions Disp Refills    vitamin D (ERGOCALCIFEROL) 1.25 MG (59263 UT) CAPS capsule [Pharmacy Med Name: VITAMIN D2 50,000IU (ERGO) CAP RX] 12 capsule 1     Sig: TAKE 1 CAPSULE BY MOUTH 1 TIME A WEEK

## 2023-08-07 RX ORDER — ERGOCALCIFEROL 1.25 MG/1
CAPSULE ORAL
Qty: 12 CAPSULE | Refills: 1 | Status: SHIPPED | OUTPATIENT
Start: 2023-08-07

## 2023-08-09 DIAGNOSIS — E78.00 PURE HYPERCHOLESTEROLEMIA: ICD-10-CM

## 2023-08-09 RX ORDER — LOVASTATIN 20 MG/1
TABLET ORAL
Qty: 90 TABLET | Refills: 1 | Status: SHIPPED | OUTPATIENT
Start: 2023-08-09

## 2023-08-09 NOTE — TELEPHONE ENCOUNTER
Von Siddiqi called requesting a refill of the below medication which has been pended for you:     Requested Prescriptions     Pending Prescriptions Disp Refills    lovastatin (MEVACOR) 20 MG tablet [Pharmacy Med Name: LOVASTATIN 20MG TABLETS] 90 tablet 1     Sig: TAKE 1 TABLET BY MOUTH EVERY NIGHT       Last Appointment Date: 6/13/2023  Next Appointment Date: 12/14/2023    No Known Allergies

## 2023-08-28 DIAGNOSIS — I63.9 CEREBROVASCULAR ACCIDENT (CVA), UNSPECIFIED MECHANISM (HCC): ICD-10-CM

## 2023-08-28 DIAGNOSIS — I63.9 CRYPTOGENIC STROKE (HCC): ICD-10-CM

## 2023-08-28 DIAGNOSIS — Z95.818 STATUS POST PLACEMENT OF IMPLANTABLE LOOP RECORDER: Primary | ICD-10-CM

## 2023-08-28 PROCEDURE — 93298 REM INTERROG DEV EVAL SCRMS: CPT | Performed by: NURSE PRACTITIONER

## 2023-08-28 PROCEDURE — G2066 INTER DEVC REMOTE 30D: HCPCS | Performed by: NURSE PRACTITIONER

## 2023-09-28 DIAGNOSIS — I63.9 CEREBROVASCULAR ACCIDENT (CVA), UNSPECIFIED MECHANISM (HCC): ICD-10-CM

## 2023-09-28 DIAGNOSIS — I63.9 CRYPTOGENIC STROKE (HCC): ICD-10-CM

## 2023-09-28 DIAGNOSIS — Z95.818 STATUS POST PLACEMENT OF IMPLANTABLE LOOP RECORDER: Primary | ICD-10-CM

## 2023-09-28 PROCEDURE — G2066 INTER DEVC REMOTE 30D: HCPCS | Performed by: NURSE PRACTITIONER

## 2023-09-28 PROCEDURE — 93298 REM INTERROG DEV EVAL SCRMS: CPT | Performed by: NURSE PRACTITIONER

## 2023-09-28 RX ORDER — FLUTICASONE PROPIONATE AND SALMETEROL 250; 50 UG/1; UG/1
POWDER RESPIRATORY (INHALATION)
Qty: 60 EACH | Refills: 2 | Status: SHIPPED | OUTPATIENT
Start: 2023-09-28

## 2023-09-28 RX ORDER — METOPROLOL SUCCINATE 25 MG/1
25 TABLET, EXTENDED RELEASE ORAL DAILY
Qty: 30 TABLET | Refills: 3 | Status: SHIPPED | OUTPATIENT
Start: 2023-09-28 | End: 2024-01-26

## 2023-09-28 RX ORDER — LISINOPRIL 2.5 MG/1
2.5 TABLET ORAL DAILY
Qty: 90 TABLET | Refills: 1 | Status: SHIPPED | OUTPATIENT
Start: 2023-09-28

## 2023-09-28 NOTE — TELEPHONE ENCOUNTER
Karrie Casper called requesting a refill of the below medication which has been pended for you:     Requested Prescriptions     Pending Prescriptions Disp Refills    fluticasone-salmeterol (ADVAIR DISKUS) 250-50 MCG/ACT AEPB diskus inhaler [Pharmacy Med Name: Nubia Ortez 250/50MCG (YELLOW) 60] 60 each 2     Sig: INHALE 1 PUFF BY MOUTH TWICE DAILY       Last Appointment Date: 6/13/2023  Next Appointment Date: 9/28/2023    No Known Allergies

## 2023-09-28 NOTE — TELEPHONE ENCOUNTER
Arleen Castillo called requesting a refill of the below medication which has been pended for you:     Requested Prescriptions     Pending Prescriptions Disp Refills    lisinopril (PRINIVIL;ZESTRIL) 2.5 MG tablet [Pharmacy Med Name: LISINOPRIL 2.5MG TABLETS] 90 tablet 1     Sig: TAKE 1 TABLET BY MOUTH DAILY       Last Appointment Date: 6/13/2023  Next Appointment Date: 12/14/2023    No Known Allergies

## 2023-10-12 ENCOUNTER — OFFICE VISIT (OUTPATIENT)
Dept: INTERNAL MEDICINE | Age: 78
End: 2023-10-12
Payer: MEDICARE

## 2023-10-12 ENCOUNTER — OFFICE VISIT (OUTPATIENT)
Dept: ENT CLINIC | Age: 78
End: 2023-10-12
Payer: MEDICARE

## 2023-10-12 ENCOUNTER — HOSPITAL ENCOUNTER (OUTPATIENT)
Dept: GENERAL RADIOLOGY | Age: 78
Discharge: HOME | End: 2023-10-12
Payer: MEDICARE

## 2023-10-12 VITALS
WEIGHT: 199 LBS | BODY MASS INDEX: 26.95 KG/M2 | DIASTOLIC BLOOD PRESSURE: 62 MMHG | HEIGHT: 72 IN | SYSTOLIC BLOOD PRESSURE: 124 MMHG

## 2023-10-12 VITALS
OXYGEN SATURATION: 98 % | BODY MASS INDEX: 26.95 KG/M2 | HEIGHT: 72 IN | DIASTOLIC BLOOD PRESSURE: 62 MMHG | SYSTOLIC BLOOD PRESSURE: 118 MMHG | HEART RATE: 69 BPM | WEIGHT: 199 LBS | RESPIRATION RATE: 20 BRPM

## 2023-10-12 DIAGNOSIS — H61.23 BILATERAL IMPACTED CERUMEN: Primary | ICD-10-CM

## 2023-10-12 DIAGNOSIS — J20.9 ACUTE BRONCHITIS, UNSPECIFIED ORGANISM: Primary | ICD-10-CM

## 2023-10-12 DIAGNOSIS — J20.9 ACUTE BRONCHITIS, UNSPECIFIED ORGANISM: ICD-10-CM

## 2023-10-12 PROBLEM — H72.92 PERFORATED TYMPANIC MEMBRANE ON EXAMINATION, LEFT: Status: RESOLVED | Noted: 2022-07-05 | Resolved: 2023-10-12

## 2023-10-12 PROCEDURE — 71046 X-RAY EXAM CHEST 2 VIEWS: CPT

## 2023-10-12 PROCEDURE — 3078F DIAST BP <80 MM HG: CPT | Performed by: PHYSICIAN ASSISTANT

## 2023-10-12 PROCEDURE — G8427 DOCREV CUR MEDS BY ELIG CLIN: HCPCS | Performed by: PHYSICIAN ASSISTANT

## 2023-10-12 PROCEDURE — G8427 DOCREV CUR MEDS BY ELIG CLIN: HCPCS | Performed by: NURSE PRACTITIONER

## 2023-10-12 PROCEDURE — 3074F SYST BP LT 130 MM HG: CPT | Performed by: PHYSICIAN ASSISTANT

## 2023-10-12 PROCEDURE — G8417 CALC BMI ABV UP PARAM F/U: HCPCS | Performed by: NURSE PRACTITIONER

## 2023-10-12 PROCEDURE — G8417 CALC BMI ABV UP PARAM F/U: HCPCS | Performed by: PHYSICIAN ASSISTANT

## 2023-10-12 PROCEDURE — 3078F DIAST BP <80 MM HG: CPT | Performed by: NURSE PRACTITIONER

## 2023-10-12 PROCEDURE — 1123F ACP DISCUSS/DSCN MKR DOCD: CPT | Performed by: PHYSICIAN ASSISTANT

## 2023-10-12 PROCEDURE — 1123F ACP DISCUSS/DSCN MKR DOCD: CPT | Performed by: NURSE PRACTITIONER

## 2023-10-12 PROCEDURE — 69210 REMOVE IMPACTED EAR WAX UNI: CPT | Performed by: PHYSICIAN ASSISTANT

## 2023-10-12 PROCEDURE — G8484 FLU IMMUNIZE NO ADMIN: HCPCS | Performed by: NURSE PRACTITIONER

## 2023-10-12 PROCEDURE — 3074F SYST BP LT 130 MM HG: CPT | Performed by: NURSE PRACTITIONER

## 2023-10-12 PROCEDURE — G8484 FLU IMMUNIZE NO ADMIN: HCPCS | Performed by: PHYSICIAN ASSISTANT

## 2023-10-12 PROCEDURE — 4004F PT TOBACCO SCREEN RCVD TLK: CPT | Performed by: NURSE PRACTITIONER

## 2023-10-12 PROCEDURE — 99214 OFFICE O/P EST MOD 30 MIN: CPT | Performed by: NURSE PRACTITIONER

## 2023-10-12 PROCEDURE — 4004F PT TOBACCO SCREEN RCVD TLK: CPT | Performed by: PHYSICIAN ASSISTANT

## 2023-10-12 RX ORDER — OXYCODONE AND ACETAMINOPHEN 10; 325 MG/1; MG/1
1 TABLET ORAL EVERY 6 HOURS PRN
COMMUNITY
Start: 2023-10-02

## 2023-10-12 RX ORDER — CEFDINIR 300 MG/1
300 CAPSULE ORAL 2 TIMES DAILY
Qty: 14 CAPSULE | Refills: 0 | Status: SHIPPED | OUTPATIENT
Start: 2023-10-12 | End: 2023-10-19

## 2023-10-12 RX ORDER — CLOPIDOGREL BISULFATE 75 MG/1
TABLET ORAL
Qty: 90 TABLET | Refills: 1 | Status: SHIPPED | OUTPATIENT
Start: 2023-10-12

## 2023-10-12 RX ORDER — METHYLPREDNISOLONE ACETATE 80 MG/ML
80 INJECTION, SUSPENSION INTRA-ARTICULAR; INTRALESIONAL; INTRAMUSCULAR; SOFT TISSUE ONCE
Status: COMPLETED | OUTPATIENT
Start: 2023-10-12 | End: 2023-10-12

## 2023-10-12 RX ADMIN — METHYLPREDNISOLONE ACETATE 80 MG: 80 INJECTION, SUSPENSION INTRA-ARTICULAR; INTRALESIONAL; INTRAMUSCULAR; SOFT TISSUE at 11:52

## 2023-10-12 ASSESSMENT — ENCOUNTER SYMPTOMS
COLOR CHANGE: 0
EYE DISCHARGE: 0
SORE THROAT: 0
ABDOMINAL DISTENTION: 0
EYE ITCHING: 0
COUGH: 1
ABDOMINAL PAIN: 0
VOMITING: 0
CHOKING: 0
DIARRHEA: 0
SHORTNESS OF BREATH: 0
TROUBLE SWALLOWING: 0
BLOOD IN STOOL: 0
CONSTIPATION: 0
BACK PAIN: 1
NAUSEA: 0
STRIDOR: 0
WHEEZING: 0

## 2023-10-12 NOTE — PROGRESS NOTES
[facility] INTERNAL MEDICINE  [address] [facility],[address]  [address] [address]  Dept: [phone]  Dept Fax: [phone]: [phone]    Ashley Keene (:  1945) is a 77 y.o. male,Established patient  with green , here for evaluation of the following chief complaint(s): Other (Congestion for a while and not improving. Mucinex x 2 weeks with no improvement. Non-productive cough. 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 Lieutenant De Los Santos is c/robinson Other (Congestion for a while and not improving. Mucinex x 2 weeks with no improvement. Non-productive cough. )        HPI:     Chief Complaint   Patient presents with    Other     Congestion for a while and not improving. Mucinex x 2 weeks with no improvement. Non-productive cough.       @pt is alone     HPI   Back pain;  he has arthritis and [address] is going to [address];  he is on oxycodone 10 mg every hours; pain management has recently increase that  Cough and chest congestion;  he has tried 2 weeks of mucinex;   clear sputum;   his wife got concerned because after 2 weeks he still having issues      Past Medical History:   Diagnosis Date    Adenomatous colon polyp     CAD (coronary artery disease)     LAD stent 3/2005 [facility]    Cerebral artery occlusion with cerebral infarction [facility])     COPD (chronic obstructive pulmonary disease) ([address])     Hard of hearing     WEARS RT HEARING AIDE    Hypertension     Male erectile dysfunction     Memory deficit following cerebral infarction 2023    Pure hypercholesterolemia     Sick sinus syndrome ([address])     Vertigo       Past Surgical History:   Procedure Laterality Date    MASTOIDECTOMY      left ear 3/15 tympanomastoidectomy, Dr Jana Rose, [facility]           10/12/2023    11:15 AM 10/12/2023    10:19 AM 2023     2:32 PM 2023     1:08 PM 2023     9:35 AM 2023     3:45 PM   Vitals   [facility] [phone]

## 2023-10-12 NOTE — PROGRESS NOTES
Mr. Sheridan Santana is a very pleasant 65-year-old  male that presents for a 3-month cerumen check. Patient reports that he has noticed gradual diminished hearing that is more prominent to the right side. He has diminished hearing on the left side that is preestablished due to a mastoidal surgery. Physical examination with the microscope confirmed a cerumen impaction to the right ear that was removed with suction and alligator graspers with no complications. After disimpaction the TM appeared to be normal.  The left ear demonstrated a cerumen packs also to be present that was removed with alligator graspers and suction. After disimpaction the canal was noted be dry with no evidence of infection. Neck exam demonstrated no lymphadenopathy or thyromegaly. Impression: Successful cerumen disimpaction with microscopy and instrumentation-bilateral      Plan: Patient is to follow-up in 3 months for cerumen check. He was reminded to call if he has any questions or problems.         Electronically signed by Tessa Mosley PA-C on 10/12/23 at 10:43 AM CDT

## 2023-10-12 NOTE — PATIENT INSTRUCTIONS
1.  Acute bronchitis;  medrol 80  IM today   Cefdinir 300 twice daily for 7 days   Mucinex 1200 gn twice a day with a large glass of water for 5-7 days   Cxr today

## 2023-10-12 NOTE — TELEPHONE ENCOUNTER
Christian Gonzalez called requesting a refill of the below medication which has been pended for you:     Requested Prescriptions     Pending Prescriptions Disp Refills    clopidogrel (PLAVIX) 75 MG tablet [Pharmacy Med Name: CLOPIDOGREL 75MG TABLETS] 90 tablet 1     Sig: TAKE 1 TABLET BY MOUTH EVERY DAY       Last Appointment Date: 6/13/2023  Next Appointment Date: 10/16/2023    No Known Allergies

## 2023-10-18 DIAGNOSIS — Z95.818 STATUS POST PLACEMENT OF IMPLANTABLE LOOP RECORDER: Primary | ICD-10-CM

## 2023-10-18 DIAGNOSIS — I63.9 CEREBROVASCULAR ACCIDENT (CVA), UNSPECIFIED MECHANISM (HCC): ICD-10-CM

## 2023-10-18 DIAGNOSIS — I63.9 CRYPTOGENIC STROKE (HCC): ICD-10-CM

## 2023-10-26 ENCOUNTER — TELEPHONE (OUTPATIENT)
Dept: INTERNAL MEDICINE | Age: 78
End: 2023-10-26

## 2023-10-26 DIAGNOSIS — G89.4 CHRONIC PAIN SYNDROME: Primary | ICD-10-CM

## 2023-10-26 RX ORDER — GABAPENTIN 300 MG/1
CAPSULE ORAL
Qty: 90 CAPSULE | Refills: 5 | Status: SHIPPED | OUTPATIENT
Start: 2023-10-26 | End: 2023-11-26

## 2023-10-26 RX ORDER — HYDROCODONE BITARTRATE AND ACETAMINOPHEN 10; 325 MG/1; MG/1
1 TABLET ORAL EVERY 6 HOURS PRN
Qty: 120 TABLET | Refills: 0 | Status: SHIPPED | OUTPATIENT
Start: 2023-10-26 | End: 2023-11-25

## 2023-10-26 NOTE — TELEPHONE ENCOUNTER
Patient is needing refill oxycodone and gabapentin sent to [facility] on [address].  Patient was apart of pain management that closed and new pain management won't be able to see him until after Jan. 1.

## 2023-10-26 NOTE — TELEPHONE ENCOUNTER
Pt's wife states that doc on [address] [facility] has norco 10 , and gabapentin .  Pt would like to know if they can get 120 of each please advise

## 2023-10-26 NOTE — TELEPHONE ENCOUNTER
LETA SAYS SHE CAN WRITE NORCO 10 FOR HIM IF HE CAN FIND IT AND THINKS THAT WILL CONTOL HIS PAIN UNTIL HE CAN GET APPT WITH PAIN MANAGEMENT

## 2023-10-26 NOTE — TELEPHONE ENCOUNTER
Williams Bolton called to request a refill on his medication. Last office visit : 10/12/2023   Next office visit : 12/14/2023     Last UDS: No results found for: \"LABAMPH\", \"LABBARB\", \"LABBENZ\", \"BUPRENUR\", \"COCAIMETSCRU\", \"GABAPENTIN\", \"MDMA\", \"METAMPU\", \"OPIATESCREENURINE\", \"OXTCOSU\", \"PHENCYCLIDINESCREENURINE\", \"PROPOXYPHENE\", \"THCSCREENUR\", \"TRICYUR\"    Last Wolfgang Champ:   Medication Contract:      Requested Prescriptions     Pending Prescriptions Disp Refills    HYDROcodone-acetaminophen (NORCO)  MG per tablet 120 tablet 0     Sig: Take 1 tablet by mouth every 6 hours as needed for Pain for up to 30 days. Intended supply: 30 days Max Daily Amount: 4 tablets    gabapentin (NEURONTIN) 300 MG capsule 90 capsule 5     Sig: TAKE 1 CAPSULE BY MOUTH EVERY  6 HOURS AS NEEDED         Please approve or refuse this medication.    Sarah Salinas MA

## 2023-10-31 DIAGNOSIS — Z95.818 STATUS POST PLACEMENT OF IMPLANTABLE LOOP RECORDER: Primary | ICD-10-CM

## 2023-10-31 DIAGNOSIS — I63.9 CEREBROVASCULAR ACCIDENT (CVA), UNSPECIFIED MECHANISM (HCC): ICD-10-CM

## 2023-10-31 DIAGNOSIS — I63.9 CRYPTOGENIC STROKE (HCC): ICD-10-CM

## 2023-10-31 PROCEDURE — G2066 INTER DEVC REMOTE 30D: HCPCS | Performed by: NURSE PRACTITIONER

## 2023-10-31 PROCEDURE — 93298 REM INTERROG DEV EVAL SCRMS: CPT | Performed by: NURSE PRACTITIONER

## 2023-11-04 DIAGNOSIS — I25.10 CORONARY ARTERY DISEASE INVOLVING NATIVE CORONARY ARTERY OF NATIVE HEART WITHOUT ANGINA PECTORIS: ICD-10-CM

## 2023-11-06 RX ORDER — NITROGLYCERIN 0.4 MG/1
TABLET SUBLINGUAL
Qty: 25 TABLET | Refills: 1 | Status: SHIPPED | OUTPATIENT
Start: 2023-11-06

## 2023-11-06 RX ORDER — CELECOXIB 100 MG/1
100 CAPSULE ORAL DAILY
Qty: 60 CAPSULE | Refills: 3 | Status: SHIPPED | OUTPATIENT
Start: 2023-11-06

## 2023-11-06 NOTE — TELEPHONE ENCOUNTER
Corby Daniel called requesting a refill of the below medication which has been pended for you:     Requested Prescriptions     Pending Prescriptions Disp Refills    celecoxib (CELEBREX) 100 MG capsule [Pharmacy Med Name: CELECOXIB 100MG CAPSULES] 60 capsule 3     Sig: TAKE 1 CAPSULE BY MOUTH DAILY       Last Appointment Date: 10/12/2023  Next Appointment Date: 12/14/2023    No Known Allergies

## 2023-11-06 NOTE — TELEPHONE ENCOUNTER
Carolin Shook called requesting a refill of the below medication which has been pended for you:     Requested Prescriptions     Pending Prescriptions Disp Refills    celecoxib (CELEBREX) 100 MG capsule [Pharmacy Med Name: CELECOXIB 100MG CAPSULES] 60 capsule 3     Sig: TAKE 1 CAPSULE BY MOUTH DAILY       Last Appointment Date: 10/12/2023  Next Appointment Date: 11/4/2023    No Known Allergies

## 2023-11-08 RX ORDER — METOPROLOL SUCCINATE 25 MG/1
25 TABLET, EXTENDED RELEASE ORAL DAILY
Qty: 30 TABLET | Refills: 5 | Status: SHIPPED | OUTPATIENT
Start: 2023-11-08 | End: 2024-03-07

## 2023-11-17 ENCOUNTER — OFFICE VISIT (OUTPATIENT)
Dept: NEUROLOGY | Age: 78
End: 2023-11-17
Payer: MEDICARE

## 2023-11-17 VITALS
SYSTOLIC BLOOD PRESSURE: 124 MMHG | DIASTOLIC BLOOD PRESSURE: 74 MMHG | BODY MASS INDEX: 26.95 KG/M2 | OXYGEN SATURATION: 98 % | HEART RATE: 76 BPM | WEIGHT: 199 LBS | HEIGHT: 72 IN

## 2023-11-17 DIAGNOSIS — Z86.73 HISTORY OF CVA IN ADULTHOOD: ICD-10-CM

## 2023-11-17 DIAGNOSIS — M54.6 LEFT-SIDED THORACIC BACK PAIN, UNSPECIFIED CHRONICITY: ICD-10-CM

## 2023-11-17 DIAGNOSIS — R41.3 MEMORY LOSS: Primary | ICD-10-CM

## 2023-11-17 DIAGNOSIS — Z79.899 MEDICATION MANAGEMENT: ICD-10-CM

## 2023-11-17 PROCEDURE — G8484 FLU IMMUNIZE NO ADMIN: HCPCS | Performed by: NURSE PRACTITIONER

## 2023-11-17 PROCEDURE — G8427 DOCREV CUR MEDS BY ELIG CLIN: HCPCS | Performed by: NURSE PRACTITIONER

## 2023-11-17 PROCEDURE — 3078F DIAST BP <80 MM HG: CPT | Performed by: NURSE PRACTITIONER

## 2023-11-17 PROCEDURE — 4004F PT TOBACCO SCREEN RCVD TLK: CPT | Performed by: NURSE PRACTITIONER

## 2023-11-17 PROCEDURE — 3074F SYST BP LT 130 MM HG: CPT | Performed by: NURSE PRACTITIONER

## 2023-11-17 PROCEDURE — 1123F ACP DISCUSS/DSCN MKR DOCD: CPT | Performed by: NURSE PRACTITIONER

## 2023-11-17 PROCEDURE — G8417 CALC BMI ABV UP PARAM F/U: HCPCS | Performed by: NURSE PRACTITIONER

## 2023-11-17 PROCEDURE — 99214 OFFICE O/P EST MOD 30 MIN: CPT | Performed by: NURSE PRACTITIONER

## 2023-11-17 RX ORDER — DULOXETIN HYDROCHLORIDE 30 MG/1
30 CAPSULE, DELAYED RELEASE ORAL DAILY
Qty: 30 CAPSULE | Refills: 3 | Status: SHIPPED | OUTPATIENT
Start: 2023-11-17

## 2023-11-17 RX ORDER — MAGNESIUM 30 MG
30 TABLET ORAL 2 TIMES DAILY
COMMUNITY

## 2023-11-17 NOTE — PROGRESS NOTES
REVIEW OF SYSTEMS    Constitutional: []Fever []Sweat []Chills [] Recent Injury [x] Denies all unless marked  HEENT:[]Headache  [] Head Injury/Hearing Loss  [] Sore Throat  [] Ear Ache/Dizziness  [x] Denies all unless marked  Spine:  [] Neck pain  [x] Back pain  [] Sciaticia  [x] Denies all unless marked  Cardiovascular:[]Heart Disease []Chest Pain [] Palpitations  [x] Denies all unless marked  Pulmonary: []Shortness of Breath []Cough   [x] Denies all unless marke  Gastrointestinal: []Nausea  []Vomiting  []Abdominal Pain  []Constipation  []Diarrhea  []Dark Bloody Stools  [x] Denies all unless marked  Psychiatric/Behavioral:[] Depression [] Anxiety [x] Denies all unless marked  Genitourinary:   [] Frequency  [] Urgency  [] Incontinence [] Pain with Urination  [x] Denies all unless marked  Extremities: []Pain  []Swelling  [x] Denies all unless marked  Musculoskeletal: [] Muscle Pain  [] Joint Pain  [] Arthritis [] Muscle Cramps [] Muscle Twitches  [x] Denies all unless marked  Sleep: [] Insomnia [] Snoring [] Restless Legs [] Sleep Apnea  [] Daytime Sleepiness  [x] Denies all unless marked  Skin:[] Rash [] Skin Discoloration [x] Denies all unless marked   Neurological: []Visual Disturbance/Memory Loss [] Loss of Balance [] Slurred Speech/Weakness [] Seizures  [] Vertigo/Dizziness [x] Denies all unless marked
MRI brain notes old bilateral medial thalamic infarcts with mild chronic ischemic disease. Ultrasound carotid arteries with less than 50% stenosis bilaterally. Zio patch completed with largely underlying sinus rhythm but runs of V. tach and SVT with frequent ventricular ectopics. Echocardiogram with abnormal bubble study indicating intra-atrial communications. SHERRY completed, cardiology evaluated with loop recorder placed. Has had chemical stress with no ischemia. RoPE score of 3 indicating very unlikely probability that PFO is cause of CVA. Defer to cardiology for further management of PFO. Exam unchanged, postural tremor to left hand and cognitive deficit noted. Likely has underlying neurodegenerative disease with progressive memory loss. His main concern today is daily severe back pain. He is already on Percocet, gabapentin 300 mg every 6 hours as needed. We will add in low-dose Cymbalta to see if it gives him any benefit. Discussed with wife that after he has been on this medicine for a month if no side effects contact me because I also want him to be started on memory medicine, Namenda. Discussed this in detail. We will work on pain management referral.  We will also request OI records. ICD-10-CM    1. Memory loss  R41.3       2. History of CVA in adulthood  Z80.79       3. Left-sided thoracic back pain, unspecified chronicity  M54.6 DULoxetine (CYMBALTA) 30 MG extended release capsule     Zach Zuleta APRN, Pain MedicineJonathan      4. Medication management  Z79.899           PLAN:  Start Cymbalta 30 mg daily. Side effects discussed. We will start Namenda titration down the line. Request records from orthopedic [place]. Referral to [facility] pain management. Continue following with cardiology as scheduled. Loop recorder placed. Has been seen by ophthalmology.   Maximize stroke risk factors through primary care doctor including blood pressure, cholesterol, and glucose

## 2023-11-21 DIAGNOSIS — Z98.890 HISTORY OF LOOP RECORDER: Primary | ICD-10-CM

## 2023-11-21 DIAGNOSIS — R00.0 TACHYCARDIA: ICD-10-CM

## 2023-11-21 DIAGNOSIS — I63.9 CRYPTOGENIC STROKE (HCC): ICD-10-CM

## 2023-12-04 DIAGNOSIS — Z95.818 IMPLANTABLE LOOP RECORDER PRESENT: Primary | ICD-10-CM

## 2023-12-04 DIAGNOSIS — I63.9 CRYPTOGENIC STROKE (HCC): ICD-10-CM

## 2023-12-04 PROCEDURE — G2066 INTER DEVC REMOTE 30D: HCPCS | Performed by: NURSE PRACTITIONER

## 2023-12-04 PROCEDURE — 93298 REM INTERROG DEV EVAL SCRMS: CPT | Performed by: NURSE PRACTITIONER

## 2023-12-05 ENCOUNTER — HOSPITAL ENCOUNTER (OUTPATIENT)
Dept: GENERAL RADIOLOGY | Facility: HOSPITAL | Age: 78
Discharge: HOME | End: 2023-12-05
Admitting: PHYSICAL MEDICINE & REHABILITATION
Payer: MEDICARE

## 2023-12-05 ENCOUNTER — TRANSCRIBE ORDERS (OUTPATIENT)
Dept: ADMINISTRATIVE | Facility: HOSPITAL | Age: 78
End: 2023-12-05
Payer: MEDICARE

## 2023-12-05 DIAGNOSIS — M46.1 SACROILIITIS, NOT ELSEWHERE CLASSIFIED: ICD-10-CM

## 2023-12-05 DIAGNOSIS — M46.1 SACROILIITIS, NOT ELSEWHERE CLASSIFIED: Primary | ICD-10-CM

## 2023-12-05 PROCEDURE — 73030 X-RAY EXAM OF SHOULDER: CPT

## 2023-12-05 PROCEDURE — 72110 X-RAY EXAM L-2 SPINE 4/>VWS: CPT

## 2023-12-12 DIAGNOSIS — I10 HTN (HYPERTENSION), BENIGN: ICD-10-CM

## 2023-12-12 DIAGNOSIS — E55.9 VITAMIN D DEFICIENCY: ICD-10-CM

## 2023-12-12 LAB
25(OH)D3 SERPL-MCNC: 58.1 NG/ML
ALBUMIN SERPL-MCNC: 4.5 G/DL (ref 3.5–5.2)
ALP SERPL-CCNC: 70 U/L (ref 40–130)
ALT SERPL-CCNC: 13 U/L (ref 5–41)
ANION GAP SERPL CALCULATED.3IONS-SCNC: 12 MMOL/L (ref 7–19)
AST SERPL-CCNC: 14 U/L (ref 5–40)
BASOPHILS # BLD: 0.1 K/UL (ref 0–0.2)
BASOPHILS NFR BLD: 0.8 % (ref 0–1)
BILIRUB SERPL-MCNC: 1.4 MG/DL (ref 0.2–1.2)
BUN SERPL-MCNC: 21 MG/DL (ref 8–23)
CALCIUM SERPL-MCNC: 10.2 MG/DL (ref 8.8–10.2)
CHLORIDE SERPL-SCNC: 107 MMOL/L (ref 98–111)
CO2 SERPL-SCNC: 26 MMOL/L (ref 22–29)
CREAT SERPL-MCNC: 1 MG/DL (ref 0.5–1.2)
EOSINOPHIL # BLD: 0.1 K/UL (ref 0–0.6)
EOSINOPHIL NFR BLD: 1 % (ref 0–5)
ERYTHROCYTE [DISTWIDTH] IN BLOOD BY AUTOMATED COUNT: 12.7 % (ref 11.5–14.5)
GLUCOSE SERPL-MCNC: 95 MG/DL (ref 74–109)
HCT VFR BLD AUTO: 45.6 % (ref 42–52)
HGB BLD-MCNC: 14.9 G/DL (ref 14–18)
IMM GRANULOCYTES # BLD: 0 K/UL
LYMPHOCYTES # BLD: 2 K/UL (ref 1.1–4.5)
LYMPHOCYTES NFR BLD: 25.8 % (ref 20–40)
MCH RBC QN AUTO: 32 PG (ref 27–31)
MCHC RBC AUTO-ENTMCNC: 32.7 G/DL (ref 33–37)
MCV RBC AUTO: 97.9 FL (ref 80–94)
MONOCYTES # BLD: 0.5 K/UL (ref 0–0.9)
MONOCYTES NFR BLD: 6.3 % (ref 0–10)
NEUTROPHILS # BLD: 5.1 K/UL (ref 1.5–7.5)
NEUTS SEG NFR BLD: 65.8 % (ref 50–65)
PLATELET # BLD AUTO: 189 K/UL (ref 130–400)
PMV BLD AUTO: 10.4 FL (ref 9.4–12.4)
POTASSIUM SERPL-SCNC: 4.7 MMOL/L (ref 3.5–5)
PROT SERPL-MCNC: 7.2 G/DL (ref 6.6–8.7)
RBC # BLD AUTO: 4.66 M/UL (ref 4.7–6.1)
SODIUM SERPL-SCNC: 145 MMOL/L (ref 136–145)
TSH SERPL DL<=0.005 MIU/L-ACNC: 2.12 UIU/ML (ref 0.27–4.2)
WBC # BLD AUTO: 7.7 K/UL (ref 4.8–10.8)

## 2023-12-13 ENCOUNTER — OFFICE VISIT (OUTPATIENT)
Dept: INTERNAL MEDICINE | Age: 78
End: 2023-12-13
Payer: MEDICARE

## 2023-12-13 VITALS
HEIGHT: 72 IN | DIASTOLIC BLOOD PRESSURE: 61 MMHG | WEIGHT: 203 LBS | OXYGEN SATURATION: 93 % | SYSTOLIC BLOOD PRESSURE: 115 MMHG | HEART RATE: 63 BPM | BODY MASS INDEX: 27.5 KG/M2

## 2023-12-13 DIAGNOSIS — E78.00 PURE HYPERCHOLESTEROLEMIA: ICD-10-CM

## 2023-12-13 DIAGNOSIS — I10 HTN (HYPERTENSION), BENIGN: ICD-10-CM

## 2023-12-13 DIAGNOSIS — Z87.891 PERSONAL HISTORY OF TOBACCO USE: ICD-10-CM

## 2023-12-13 DIAGNOSIS — G62.9 NEUROPATHY: ICD-10-CM

## 2023-12-13 DIAGNOSIS — E55.9 VITAMIN D DEFICIENCY: ICD-10-CM

## 2023-12-13 DIAGNOSIS — G89.4 CHRONIC PAIN SYNDROME: ICD-10-CM

## 2023-12-13 DIAGNOSIS — I63.20 CEREBROVASCULAR ACCIDENT (CVA) DUE TO STENOSIS OF PRECEREBRAL ARTERY (HCC): Primary | ICD-10-CM

## 2023-12-13 DIAGNOSIS — Z00.00 MEDICARE ANNUAL WELLNESS VISIT, SUBSEQUENT: ICD-10-CM

## 2023-12-13 DIAGNOSIS — F33.41 RECURRENT MAJOR DEPRESSIVE DISORDER, IN PARTIAL REMISSION (HCC): ICD-10-CM

## 2023-12-13 DIAGNOSIS — I25.10 CORONARY ARTERY DISEASE INVOLVING NATIVE CORONARY ARTERY OF NATIVE HEART WITHOUT ANGINA PECTORIS: ICD-10-CM

## 2023-12-13 PROBLEM — H60.311 ACUTE DIFFUSE OTITIS EXTERNA OF RIGHT EAR: Status: RESOLVED | Noted: 2022-06-20 | Resolved: 2023-12-13

## 2023-12-13 PROBLEM — R41.0 CONFUSION: Status: RESOLVED | Noted: 2023-02-14 | Resolved: 2023-12-13

## 2023-12-13 PROBLEM — J20.9 ACUTE BRONCHITIS: Status: RESOLVED | Noted: 2022-10-20 | Resolved: 2023-12-13

## 2023-12-13 PROBLEM — H61.23 BILATERAL IMPACTED CERUMEN: Status: RESOLVED | Noted: 2022-06-20 | Resolved: 2023-12-13

## 2023-12-13 PROBLEM — H60.8X1 CHRONIC ACTINIC OTITIS EXTERNA OF RIGHT EAR: Status: RESOLVED | Noted: 2022-10-13 | Resolved: 2023-12-13

## 2023-12-13 PROCEDURE — 1123F ACP DISCUSS/DSCN MKR DOCD: CPT | Performed by: NURSE PRACTITIONER

## 2023-12-13 PROCEDURE — G8484 FLU IMMUNIZE NO ADMIN: HCPCS | Performed by: NURSE PRACTITIONER

## 2023-12-13 PROCEDURE — 99214 OFFICE O/P EST MOD 30 MIN: CPT | Performed by: NURSE PRACTITIONER

## 2023-12-13 PROCEDURE — G0008 ADMIN INFLUENZA VIRUS VAC: HCPCS | Performed by: NURSE PRACTITIONER

## 2023-12-13 PROCEDURE — 90694 VACC AIIV4 NO PRSRV 0.5ML IM: CPT | Performed by: NURSE PRACTITIONER

## 2023-12-13 PROCEDURE — G0296 VISIT TO DETERM LDCT ELIG: HCPCS | Performed by: NURSE PRACTITIONER

## 2023-12-13 PROCEDURE — G8417 CALC BMI ABV UP PARAM F/U: HCPCS | Performed by: NURSE PRACTITIONER

## 2023-12-13 PROCEDURE — 3078F DIAST BP <80 MM HG: CPT | Performed by: NURSE PRACTITIONER

## 2023-12-13 PROCEDURE — G8427 DOCREV CUR MEDS BY ELIG CLIN: HCPCS | Performed by: NURSE PRACTITIONER

## 2023-12-13 PROCEDURE — G0439 PPPS, SUBSEQ VISIT: HCPCS | Performed by: NURSE PRACTITIONER

## 2023-12-13 PROCEDURE — 3074F SYST BP LT 130 MM HG: CPT | Performed by: NURSE PRACTITIONER

## 2023-12-13 PROCEDURE — 4004F PT TOBACCO SCREEN RCVD TLK: CPT | Performed by: NURSE PRACTITIONER

## 2023-12-13 RX ORDER — ZOSTER VACCINE RECOMBINANT, ADJUVANTED 50 MCG/0.5
0.5 KIT INTRAMUSCULAR SEE ADMIN INSTRUCTIONS
Qty: 0.5 ML | Refills: 0 | Status: SHIPPED | OUTPATIENT
Start: 2023-12-13 | End: 2024-06-10

## 2023-12-13 RX ORDER — FLUTICASONE PROPIONATE AND SALMETEROL 250; 50 UG/1; UG/1
POWDER RESPIRATORY (INHALATION)
Qty: 60 EACH | Refills: 2 | Status: SHIPPED | OUTPATIENT
Start: 2023-12-13

## 2023-12-13 RX ORDER — LOVASTATIN 20 MG/1
20 TABLET ORAL NIGHTLY
Qty: 90 TABLET | Refills: 1 | Status: SHIPPED | OUTPATIENT
Start: 2023-12-13

## 2023-12-13 RX ORDER — ERGOCALCIFEROL 1.25 MG/1
50000 CAPSULE ORAL WEEKLY
Qty: 12 CAPSULE | Refills: 1 | Status: SHIPPED | OUTPATIENT
Start: 2023-12-13

## 2023-12-13 RX ORDER — CLOPIDOGREL BISULFATE 75 MG/1
75 TABLET ORAL DAILY
Qty: 90 TABLET | Refills: 1 | Status: SHIPPED | OUTPATIENT
Start: 2023-12-13

## 2023-12-13 RX ORDER — TIZANIDINE 4 MG/1
4 TABLET ORAL EVERY 8 HOURS PRN
Qty: 90 TABLET | Refills: 3 | Status: SHIPPED | OUTPATIENT
Start: 2023-12-13

## 2023-12-13 RX ORDER — LISINOPRIL 2.5 MG/1
2.5 TABLET ORAL DAILY
Qty: 90 TABLET | Refills: 1 | Status: SHIPPED | OUTPATIENT
Start: 2023-12-13

## 2023-12-13 SDOH — HEALTH STABILITY: PHYSICAL HEALTH: ON AVERAGE, HOW MANY DAYS PER WEEK DO YOU ENGAGE IN MODERATE TO STRENUOUS EXERCISE (LIKE A BRISK WALK)?: 0 DAYS

## 2023-12-13 ASSESSMENT — LIFESTYLE VARIABLES
HOW MANY STANDARD DRINKS CONTAINING ALCOHOL DO YOU HAVE ON A TYPICAL DAY: 0
HOW OFTEN DO YOU HAVE A DRINK CONTAINING ALCOHOL: NEVER
HOW OFTEN DO YOU HAVE A DRINK CONTAINING ALCOHOL: 1
HOW OFTEN DO YOU HAVE SIX OR MORE DRINKS ON ONE OCCASION: 1
HOW MANY STANDARD DRINKS CONTAINING ALCOHOL DO YOU HAVE ON A TYPICAL DAY: PATIENT DOES NOT DRINK

## 2023-12-13 ASSESSMENT — ENCOUNTER SYMPTOMS
COUGH: 0
EYE ITCHING: 0
SHORTNESS OF BREATH: 0
STRIDOR: 0
CONSTIPATION: 0
BLOOD IN STOOL: 0
DIARRHEA: 0
NAUSEA: 0
CHOKING: 0
SORE THROAT: 0
BACK PAIN: 1
TROUBLE SWALLOWING: 0
VOMITING: 0
ABDOMINAL DISTENTION: 0
ABDOMINAL PAIN: 0
WHEEZING: 0
COLOR CHANGE: 0
EYE DISCHARGE: 0

## 2023-12-13 ASSESSMENT — PATIENT HEALTH QUESTIONNAIRE - PHQ9
10. IF YOU CHECKED OFF ANY PROBLEMS, HOW DIFFICULT HAVE THESE PROBLEMS MADE IT FOR YOU TO DO YOUR WORK, TAKE CARE OF THINGS AT HOME, OR GET ALONG WITH OTHER PEOPLE: 0
6. FEELING BAD ABOUT YOURSELF - OR THAT YOU ARE A FAILURE OR HAVE LET YOURSELF OR YOUR FAMILY DOWN: 0
4. FEELING TIRED OR HAVING LITTLE ENERGY: 3
1. LITTLE INTEREST OR PLEASURE IN DOING THINGS: 3
9. THOUGHTS THAT YOU WOULD BE BETTER OFF DEAD, OR OF HURTING YOURSELF: 0
SUM OF ALL RESPONSES TO PHQ QUESTIONS 1-9: 6
5. POOR APPETITE OR OVEREATING: 0
8. MOVING OR SPEAKING SO SLOWLY THAT OTHER PEOPLE COULD HAVE NOTICED. OR THE OPPOSITE, BEING SO FIGETY OR RESTLESS THAT YOU HAVE BEEN MOVING AROUND A LOT MORE THAN USUAL: 0
SUM OF ALL RESPONSES TO PHQ QUESTIONS 1-9: 6
SUM OF ALL RESPONSES TO PHQ QUESTIONS 1-9: 6
3. TROUBLE FALLING OR STAYING ASLEEP: 0
2. FEELING DOWN, DEPRESSED OR HOPELESS: 0
SUM OF ALL RESPONSES TO PHQ QUESTIONS 1-9: 6
SUM OF ALL RESPONSES TO PHQ9 QUESTIONS 1 & 2: 3
7. TROUBLE CONCENTRATING ON THINGS, SUCH AS READING THE NEWSPAPER OR WATCHING TELEVISION: 0

## 2023-12-13 NOTE — PATIENT INSTRUCTIONS
CVA  stable   CAD stable  Hypertension;  stabl with current meds  Hyperlipidemia;  continue with mevacor;  add fish oil 1200 take 2 at bedtime; with mevacor    Vit d def; if you want to get level up to 70  you can take weekly and one week take it twice;    Chronic back  pain going to pain management   Chronic pain; stable with cymbalta  Need for CT lung screening        Learning About Lung Cancer Screening  What is screening for lung cancer? Lung cancer screening is a way to find some lung cancers early, before a person has any symptoms of the cancer. Lung cancer screening may help those who have the highest risk for lung cancer--people age 48 and older who are or were heavy smokers. For most people, who aren't at increased risk, screening for lung cancer probably isn't helpful. Screening won't prevent cancer. And it may not find all lung cancers. Lung cancer screening may lower the risk of dying from lung cancer in a small number of people. How is it done? Lung cancer screening is done with a low-dose CT (computed tomography) scan. A CT scan uses X-rays, or radiation, to make detailed pictures of your body. Experts recommend that screening be done in medical centers that focus on finding and treating lung cancer. Who is screening recommended for? Lung cancer screening is recommended for people age 48 and older who are or were heavy smokers. That means people with a smoking history of at least 20 pack years. A pack year is a way to measure how heavy a smoker you are or were. To figure out your pack years, multiply how many packs a day on average (assuming 20 cigarettes per pack) you have smoked by how many years you have smoked. For example: If you smoked 1 pack a day for 20 years, that's 1 times 20. So you have a smoking history of 20 pack years. If you smoked 2 packs a day for 10 years, that's 2 times 10. So you have a smoking history of 20 pack years.   Experts agree that screening is for people who

## 2023-12-13 NOTE — PROGRESS NOTES
helpful. Screening won't prevent cancer. And it may not find all lung cancers. Lung cancer screening may lower the risk of dying from lung cancer in a small number of people. How is it done? Lung cancer screening is done with a low-dose CT (computed tomography) scan. A CT scan uses X-rays, or radiation, to make detailed pictures of your body. Experts recommend that screening be done in medical centers that focus on finding and treating lung cancer. Who is screening recommended for? Lung cancer screening is recommended for people age 48 and older who are or were heavy smokers. That means people with a smoking history of at least 20 pack years. A pack year is a way to measure how heavy a smoker you are or were. To figure out your pack years, multiply how many packs a day on average (assuming 20 cigarettes per pack) you have smoked by how many years you have smoked. For example: If you smoked 1 pack a day for 20 years, that's 1 times 20. So you have a smoking history of 20 pack years. If you smoked 2 packs a day for 10 years, that's 2 times 10. So you have a smoking history of 20 pack years. Experts agree that screening is for people who have a high risk of lung cancer. But experts don't agree on what high risk means. Some say people age 48 or older with at least a 20-pack-year smoking history are high risk. Others say it's people age 54 or older with a 30-pack-year history. To see if you could benefit from screening, first find out if you are at high risk for lung cancer. Your doctor can help you decide your lung cancer risk. What are the risks of screening? CT screening for lung cancer isn't perfect. It can show an abnormal result when it turns out there wasn't any cancer. This is called a false-positive result. This means you may need more tests to make sure you don't have cancer. These tests can be harmful and cause a lot of worry.   These tests may include more CT scans and invasive testing like a lung

## 2023-12-26 RX ORDER — FLUTICASONE PROPIONATE AND SALMETEROL 250; 50 UG/1; UG/1
POWDER RESPIRATORY (INHALATION)
Qty: 60 EACH | Refills: 2 | Status: SHIPPED | OUTPATIENT
Start: 2023-12-26 | End: 2023-12-29

## 2023-12-26 NOTE — TELEPHONE ENCOUNTER
Karrie Casper called requesting a refill of the below medication which has been pended for you:     Requested Prescriptions     Pending Prescriptions Disp Refills    fluticasone-salmeterol (ADVAIR DISKUS) 250-50 MCG/ACT AEPB diskus inhaler 60 each 2     Sig: INHALE 1 PUFF BY MOUTH TWICE DAILY       Last Appointment Date: Visit date not found  Next Appointment Date: 6/13/2024    No Known Allergies

## 2023-12-29 RX ORDER — FLUTICASONE PROPIONATE AND SALMETEROL 250; 50 UG/1; UG/1
POWDER RESPIRATORY (INHALATION)
Qty: 60 EACH | Refills: 2 | Status: SHIPPED | OUTPATIENT
Start: 2023-12-29

## 2024-01-08 DIAGNOSIS — Z95.818 STATUS POST PLACEMENT OF IMPLANTABLE LOOP RECORDER: Primary | ICD-10-CM

## 2024-01-08 DIAGNOSIS — I63.9 CRYPTOGENIC STROKE (HCC): ICD-10-CM

## 2024-01-08 PROCEDURE — 93298 REM INTERROG DEV EVAL SCRMS: CPT | Performed by: NURSE PRACTITIONER

## 2024-01-10 RX ORDER — TIZANIDINE 4 MG/1
TABLET ORAL
Qty: 90 TABLET | Refills: 3 | Status: SHIPPED | OUTPATIENT
Start: 2024-01-10

## 2024-01-10 NOTE — TELEPHONE ENCOUNTER
Luis A called requesting a refill of the below medication which has been pended for you:     Requested Prescriptions     Pending Prescriptions Disp Refills    tiZANidine (ZANAFLEX) 4 MG tablet [Pharmacy Med Name: TIZANIDINE 4MG TABLETS] 90 tablet 3     Sig: TAKE 1 TABLET BY MOUTH EVERY 8 HOURS AS NEEDED FOR NECK PAIN       Last Appointment Date: 12/13/2023  Next Appointment Date: 6/13/2024    No Known Allergies

## 2024-01-23 ENCOUNTER — OFFICE VISIT (OUTPATIENT)
Dept: CARDIOLOGY CLINIC | Age: 79
End: 2024-01-23
Payer: MEDICARE

## 2024-01-23 VITALS
WEIGHT: 201 LBS | SYSTOLIC BLOOD PRESSURE: 124 MMHG | HEART RATE: 58 BPM | HEIGHT: 72 IN | DIASTOLIC BLOOD PRESSURE: 68 MMHG | OXYGEN SATURATION: 97 % | BODY MASS INDEX: 27.22 KG/M2

## 2024-01-23 DIAGNOSIS — Z72.0 TOBACCO ABUSE: ICD-10-CM

## 2024-01-23 DIAGNOSIS — I10 HTN (HYPERTENSION), BENIGN: ICD-10-CM

## 2024-01-23 DIAGNOSIS — I63.9 CRYPTOGENIC STROKE (HCC): ICD-10-CM

## 2024-01-23 DIAGNOSIS — I25.10 CORONARY ARTERY DISEASE INVOLVING NATIVE CORONARY ARTERY OF NATIVE HEART WITHOUT ANGINA PECTORIS: Primary | ICD-10-CM

## 2024-01-23 DIAGNOSIS — Z98.890 HISTORY OF LOOP RECORDER: ICD-10-CM

## 2024-01-23 DIAGNOSIS — E78.00 PURE HYPERCHOLESTEROLEMIA: ICD-10-CM

## 2024-01-23 PROCEDURE — 3078F DIAST BP <80 MM HG: CPT | Performed by: CLINICAL NURSE SPECIALIST

## 2024-01-23 PROCEDURE — G8484 FLU IMMUNIZE NO ADMIN: HCPCS | Performed by: CLINICAL NURSE SPECIALIST

## 2024-01-23 PROCEDURE — 1123F ACP DISCUSS/DSCN MKR DOCD: CPT | Performed by: CLINICAL NURSE SPECIALIST

## 2024-01-23 PROCEDURE — G8417 CALC BMI ABV UP PARAM F/U: HCPCS | Performed by: CLINICAL NURSE SPECIALIST

## 2024-01-23 PROCEDURE — 99214 OFFICE O/P EST MOD 30 MIN: CPT | Performed by: CLINICAL NURSE SPECIALIST

## 2024-01-23 PROCEDURE — 3074F SYST BP LT 130 MM HG: CPT | Performed by: CLINICAL NURSE SPECIALIST

## 2024-01-23 PROCEDURE — G8427 DOCREV CUR MEDS BY ELIG CLIN: HCPCS | Performed by: CLINICAL NURSE SPECIALIST

## 2024-01-23 PROCEDURE — 93285 PRGRMG DEV EVAL SCRMS IP: CPT | Performed by: CLINICAL NURSE SPECIALIST

## 2024-01-23 PROCEDURE — 4004F PT TOBACCO SCREEN RCVD TLK: CPT | Performed by: CLINICAL NURSE SPECIALIST

## 2024-01-23 RX ORDER — HYDROCODONE BITARTRATE AND ACETAMINOPHEN 7.5; 325 MG/1; MG/1
1 TABLET ORAL 4 TIMES DAILY
COMMUNITY
Start: 2024-01-18

## 2024-01-23 NOTE — PATIENT INSTRUCTIONS
Strongly recommend complete smoking cessation   Loop recorder will send monthly       Maintain good blood pressure control-goal<130/80 at rest  Maintain good cholesterol control LDL goal<70 with arterial disease  If you are diabetic work to keep/obtain hemoglobin A1c< 7    Follow up in 6 mos With Dr. Fearon   Call with any questions or concerns  Follow up with Eloise Díaz APRN for non cardiac problems  Report any new problems  Cardiovascular Fitness-Exercise as tolerated.  Strive for 30 minutes of exercise most days of the week.    Cardiac / Healthy Diet- Avoid processed high fat foods, maintain low sodium/salt   Continue current medications as directed  Continue plan of treatment  It is always recommended that you bring your medications bottles with you to each visit - this is for your safety!

## 2024-01-23 NOTE — PROGRESS NOTES
rupture (HCC) 01/06/2020    Primary osteoarthritis involving multiple joints 11/19/2019    HTN (hypertension), benign 01/10/2019    Chronic pain syndrome 06/06/2018    Neuropathy 06/06/2018    Cognitive complaints 02/05/2018    COPD (chronic obstructive pulmonary disease) ([facility])     Coronary artery disease involving native coronary artery of native heart without angina pectoris     Pure hypercholesterolemia     Mixed conductive and sensorineural hearing loss of right ear with restricted hearing of left ear 11/06/2017     Past Medical History:   Diagnosis Date    Adenomatous colon polyp     CAD (coronary artery disease)     LAD stent 3/2005 [facility]    Cerebral artery occlusion with cerebral infarction ([facility])     COPD (chronic obstructive pulmonary disease) ([facility])     Hard of hearing     WEARS RT HEARING AIDE    Hypertension     Male erectile dysfunction     Memory deficit following cerebral infarction 02/2023    Pure hypercholesterolemia     Sick sinus syndrome ([facility])     Vertigo      Past Surgical History:   Procedure Laterality Date    MASTOIDECTOMY      left ear 3/15 tympanomastoidectomy, Dr Lui, [facility]     Family History   Problem Relation Age of Onset    Breast Cancer Mother     Emphysema Father      Social History     Tobacco Use    Smoking status: Every Day     Current packs/day: 1.00     Average packs/day: 1 pack/day for 74.1 years (74.1 ttl pk-yrs)     Types: Cigarettes     Start date: 1951     Passive exposure: Current    Smokeless tobacco: Never   Substance Use Topics    Alcohol use: No      Current Outpatient Medications   Medication Sig Dispense Refill    HYDROcodone-acetaminophen (NORCO) 7.5-325 MG per tablet Take 1 tablet by mouth in the morning, at noon, in the evening, and at bedtime.      tiZANidine (ZANAFLEX) 4 MG tablet TAKE 1 TABLET BY MOUTH EVERY 8 HOURS AS NEEDED FOR NECK PAIN 90 tablet 3    fluticasone-salmeterol (ADVAIR DISKUS) 250-50 MCG/ACT AEPB diskus inhaler INHALE 1 PUFF BY MOUTH

## 2024-02-05 ENCOUNTER — OFFICE VISIT (OUTPATIENT)
Dept: ENT CLINIC | Age: 79
End: 2024-02-05
Payer: MEDICARE

## 2024-02-05 VITALS
SYSTOLIC BLOOD PRESSURE: 130 MMHG | DIASTOLIC BLOOD PRESSURE: 66 MMHG | BODY MASS INDEX: 27.36 KG/M2 | HEIGHT: 72 IN | WEIGHT: 202 LBS

## 2024-02-05 DIAGNOSIS — H61.23 BILATERAL IMPACTED CERUMEN: Primary | ICD-10-CM

## 2024-02-05 PROCEDURE — G8484 FLU IMMUNIZE NO ADMIN: HCPCS | Performed by: PHYSICIAN ASSISTANT

## 2024-02-05 PROCEDURE — 3078F DIAST BP <80 MM HG: CPT | Performed by: PHYSICIAN ASSISTANT

## 2024-02-05 PROCEDURE — 4004F PT TOBACCO SCREEN RCVD TLK: CPT | Performed by: PHYSICIAN ASSISTANT

## 2024-02-05 PROCEDURE — 1123F ACP DISCUSS/DSCN MKR DOCD: CPT | Performed by: PHYSICIAN ASSISTANT

## 2024-02-05 PROCEDURE — 3075F SYST BP GE 130 - 139MM HG: CPT | Performed by: PHYSICIAN ASSISTANT

## 2024-02-05 PROCEDURE — 69210 REMOVE IMPACTED EAR WAX UNI: CPT | Performed by: PHYSICIAN ASSISTANT

## 2024-02-05 PROCEDURE — G8417 CALC BMI ABV UP PARAM F/U: HCPCS | Performed by: PHYSICIAN ASSISTANT

## 2024-02-05 PROCEDURE — G8427 DOCREV CUR MEDS BY ELIG CLIN: HCPCS | Performed by: PHYSICIAN ASSISTANT

## 2024-02-05 PROCEDURE — 99212 OFFICE O/P EST SF 10 MIN: CPT | Performed by: PHYSICIAN ASSISTANT

## 2024-02-05 NOTE — PROGRESS NOTES
Mr. Keene is a pleasant 78-year-old  male that presents for a 3-month cerumen check.  Patient has a history of having mastoid surgery to the left ear requiring the cerumen check.  He reports that he has noticed his hearing aid not working well to the right ear.  He denies any drainage or any additional problems.      Physical examination with microscope revealed the patient had bilateral cerumen impactions to be present.  This was removed successfully suction and alligator graspers with no complications.  After disimpaction of the right ear patient was noted to have evidence of otitis externa with a normal TM.-Please refer to separate dictated procedure note  Neck exam demonstrated no lymphadenopathy or thyromegaly.      Impression: Successful cerumen disimpaction with microscopy and instrumentation-bilateral, low-grade otitis externa of the right ear    Plan: Advised the patient to resume his ofloxacin eardrops for a week due to the low-grade otitis externa to the right ear.  Overall I believe this is probably related to the chronic usage of the hearing aid to the ear.  He is to follow-up in 3 months for cerumen check.      Electronically signed by ARIANA KNIGHT PA-C on 2/5/24 at 11:18 AM CST

## 2024-02-16 ENCOUNTER — OFFICE VISIT (OUTPATIENT)
Dept: NEUROLOGY | Age: 79
End: 2024-02-16

## 2024-02-16 VITALS
HEART RATE: 50 BPM | SYSTOLIC BLOOD PRESSURE: 131 MMHG | HEIGHT: 72 IN | BODY MASS INDEX: 27.36 KG/M2 | OXYGEN SATURATION: 99 % | DIASTOLIC BLOOD PRESSURE: 70 MMHG | WEIGHT: 202 LBS

## 2024-02-16 DIAGNOSIS — R25.2 LEG CRAMPING: ICD-10-CM

## 2024-02-16 DIAGNOSIS — R41.3 MEMORY LOSS: ICD-10-CM

## 2024-02-16 DIAGNOSIS — I63.81 THALAMIC STROKE (HCC): ICD-10-CM

## 2024-02-16 DIAGNOSIS — Z86.73 HISTORY OF CVA IN ADULTHOOD: Primary | ICD-10-CM

## 2024-02-16 DIAGNOSIS — Z79.899 MEDICATION MANAGEMENT: ICD-10-CM

## 2024-02-16 LAB
ALBUMIN SERPL-MCNC: 4.7 G/DL (ref 3.5–5.2)
ALP SERPL-CCNC: 68 U/L (ref 40–130)
ALT SERPL-CCNC: 33 U/L (ref 5–41)
ANION GAP SERPL CALCULATED.3IONS-SCNC: 10 MMOL/L (ref 7–19)
AST SERPL-CCNC: 26 U/L (ref 5–40)
BILIRUB SERPL-MCNC: 1.1 MG/DL (ref 0.2–1.2)
BUN SERPL-MCNC: 19 MG/DL (ref 8–23)
CALCIUM SERPL-MCNC: 10.2 MG/DL (ref 8.8–10.2)
CHLORIDE SERPL-SCNC: 104 MMOL/L (ref 98–111)
CO2 SERPL-SCNC: 26 MMOL/L (ref 22–29)
CREAT SERPL-MCNC: 1 MG/DL (ref 0.5–1.2)
GLUCOSE SERPL-MCNC: 94 MG/DL (ref 74–109)
MAGNESIUM SERPL-MCNC: 2.3 MG/DL (ref 1.6–2.4)
POTASSIUM SERPL-SCNC: 4.9 MMOL/L (ref 3.5–5)
PROT SERPL-MCNC: 7.3 G/DL (ref 6.6–8.7)
SODIUM SERPL-SCNC: 140 MMOL/L (ref 136–145)

## 2024-02-16 RX ORDER — MEMANTINE HYDROCHLORIDE 10 MG/1
10 TABLET ORAL 2 TIMES DAILY
Qty: 60 TABLET | Refills: 3 | Status: SHIPPED | OUTPATIENT
Start: 2024-02-16

## 2024-02-16 NOTE — PATIENT INSTRUCTIONS
Namenda titration:     Week 1: Take 1/2 tablet in the morning   Week 2: Take 1/2 tablet in the morning and 1/2 tablet at night   Week 3: Take 1 tablet in the morning and 1/2 tablet at night   Week 4 and on: Take 1 tablet in the morning and 1 tablet at night

## 2024-02-16 NOTE — PROGRESS NOTES
REVIEW OF SYSTEMS    Constitutional: []Fever []Sweat []Chills [] Recent Injury [x] Denies all unless marked  HEENT:[]Headache  [] Head Injury/Hearing Loss  [] Sore Throat  [] Ear Ache/Dizziness  [x] Denies all unless marked  Spine:  [] Neck pain  [x] Back pain  [] Sciaticia  [x] Denies all unless marked  Cardiovascular:[]Heart Disease []Chest Pain [] Palpitations  [x] Denies all unless marked  Pulmonary: []Shortness of Breath []Cough   [x] Denies all unless marke  Gastrointestinal: []Nausea  []Vomiting  []Abdominal Pain  []Constipation  []Diarrhea  []Dark Bloody Stools  [x] Denies all unless marked  Psychiatric/Behavioral:[] Depression [] Anxiety [x] Denies all unless marked  Genitourinary:   [] Frequency  [] Urgency  [] Incontinence [] Pain with Urination  [x] Denies all unless marked  Extremities: []Pain  []Swelling  [x] Denies all unless marked  Musculoskeletal: [] Muscle Pain  [x] Joint Pain  [] Arthritis [] Muscle Cramps [] Muscle Twitches  [x] Denies all unless marked  Sleep: [] Insomnia [] Snoring [] Restless Legs [] Sleep Apnea  [] Daytime Sleepiness  [x] Denies all unless marked  Skin:[] Rash [] Skin Discoloration [x] Denies all unless marked   Neurological: [x]Visual Disturbance/Memory Loss [] Loss of Balance [] Slurred Speech/Weakness [] Seizures  [] Vertigo/Dizziness [x] Denies all unless marked       
cyst.  Moderate to large right mastoid effusion.Small left mastoid effusion.          US carotid arteries 3/20/23   Impression      There is mixed plaque visualized in the bilateral internal carotid   arteries.   There is less than 50% stenosis in the right internal carotid artery.   There is less than 50% stenosis of the left internal carotid artery.   There is normal antegrade flow in the bilateral vertebral arteries.      Signature      ----------------------------------------------------------------   Electronically signed by Pablo Hopper MD(Interpreting   physician) on 03/20/2023 04:53 PM   ----------------------------------------------------------------    ECHO 2/28/23   Conclusions      Summary   Mitral valve leaflets are mildly thickened with preserved leaflet   mobility.   Structurally normal aortic valve.   Tricuspid valve is structurally normal.   Mild tricuspid regurgitation with estimated RVSP of 23 mm Hg.   Normal size left atrium.   Intra-atrial septum was noted to have evidence of significant intra-atrial   communications by color flow Doppler and by agitated saline study with and   without valsalva.   Normal left ventricular size with preserved LV function and an estimated   ejection fraction of approximately 55-60%.   No evidence of left ventricular mass or thrombus noted.   Apical hypokinesis      Signature      ----------------------------------------------------------------   Electronically signed by Danny Cardona MD(Interpreting   physician) on 03/01/2023 08:13 AM   ----------------------------------------------------------------    MAJOR 3/16/23  Chris Mckeon MD     3/16/2023  4:01 PM   Sinus rhythm rate range 42 to 126 bpm   Ventricular tachycardia 8 runs longest 10 beats   SVT 12 runs longest 16 beats   Frequent ventricular ectopics at 2.31%      Stress Test 6/28/23  No evidence for ischemia       Reviewed records     ASSESSMENT:    Luis A Keene is a 78 y.o. year old male here for

## 2024-02-19 LAB — COPPER SERPL-MCNC: 90.6 UG/DL (ref 70–140)

## 2024-02-20 ENCOUNTER — TELEPHONE (OUTPATIENT)
Dept: NEUROLOGY | Age: 79
End: 2024-02-20

## 2024-02-20 LAB
A-TOCOPHEROL VIT E SERPL-MCNC: 5.8 MG/L (ref 5.5–18)
BETA+GAMMA TOCOPHEROL SERPL-MCNC: 1.1 MG/L (ref 0–6)
CERULOPLASMIN SERPL-MCNC: 18 MG/DL (ref 15–30)

## 2024-02-20 NOTE — TELEPHONE ENCOUNTER
Patients wife called into office stating that she is taking eli off the cymbalta due to it not helping his dreams and him taking other medication for pain.

## 2024-02-21 ENCOUNTER — TRANSCRIBE ORDERS (OUTPATIENT)
Dept: ADMINISTRATIVE | Facility: HOSPITAL | Age: 79
End: 2024-02-21
Payer: MEDICARE

## 2024-02-26 DIAGNOSIS — I63.9 CRYPTOGENIC STROKE (HCC): ICD-10-CM

## 2024-02-26 DIAGNOSIS — Z95.818 STATUS POST PLACEMENT OF IMPLANTABLE LOOP RECORDER: Primary | ICD-10-CM

## 2024-03-22 ENCOUNTER — TRANSCRIBE ORDERS (OUTPATIENT)
Dept: ADMINISTRATIVE | Facility: HOSPITAL | Age: 79
End: 2024-03-22
Payer: MEDICARE

## 2024-03-22 DIAGNOSIS — M75.42 IMPINGEMENT SYNDROME OF LEFT SHOULDER: Primary | ICD-10-CM

## 2024-03-25 RX ORDER — ERGOCALCIFEROL 1.25 MG/1
CAPSULE ORAL
Qty: 12 CAPSULE | Refills: 1 | Status: SHIPPED | OUTPATIENT
Start: 2024-03-25

## 2024-03-25 NOTE — TELEPHONE ENCOUNTER
Last OV 12/13/2023  Next OV 6/13/2024      Requested Prescriptions     Pending Prescriptions Disp Refills    vitamin D (ERGOCALCIFEROL) 1.25 MG (12581 UT) CAPS capsule [Pharmacy Med Name: VITAMIN D2 50,000IU (ERGO) CAP RX] 12 capsule 1     Sig: TAKE 1 CAPSULE BY MOUTH 1 TIME A WEEK AT 5 PM

## 2024-03-26 DIAGNOSIS — I63.9 CRYPTOGENIC STROKE (HCC): ICD-10-CM

## 2024-03-26 DIAGNOSIS — Z95.818 STATUS POST PLACEMENT OF IMPLANTABLE LOOP RECORDER: Primary | ICD-10-CM

## 2024-03-26 PROCEDURE — 93298 REM INTERROG DEV EVAL SCRMS: CPT | Performed by: CLINICAL NURSE SPECIALIST

## 2024-03-27 DIAGNOSIS — M54.6 LEFT-SIDED THORACIC BACK PAIN, UNSPECIFIED CHRONICITY: ICD-10-CM

## 2024-03-28 RX ORDER — DULOXETIN HYDROCHLORIDE 30 MG/1
30 CAPSULE, DELAYED RELEASE ORAL DAILY
Qty: 30 CAPSULE | Refills: 3 | Status: SHIPPED | OUTPATIENT
Start: 2024-03-28

## 2024-03-28 NOTE — TELEPHONE ENCOUNTER
Requested Prescriptions     Pending Prescriptions Disp Refills    DULoxetine (CYMBALTA) 30 MG extended release capsule [Pharmacy Med Name: DULOXETINE DR 30MG CAPSULES] 30 capsule 3     Sig: TAKE 1 CAPSULE BY MOUTH DAILY       Last Office Visit: 2/16/2024  Next Office Visit: 5/16/2024  Last Medication Refill: 11/17/2023

## 2024-04-12 ENCOUNTER — LAB (OUTPATIENT)
Dept: LAB | Facility: HOSPITAL | Age: 79
End: 2024-04-12
Payer: MEDICARE

## 2024-04-12 ENCOUNTER — TRANSCRIBE ORDERS (OUTPATIENT)
Dept: ADMINISTRATIVE | Facility: HOSPITAL | Age: 79
End: 2024-04-12
Payer: MEDICARE

## 2024-04-12 DIAGNOSIS — M75.42 IMPINGEMENT SYNDROME OF LEFT SHOULDER: Primary | ICD-10-CM

## 2024-04-12 DIAGNOSIS — G89.29 CHRONIC IDIOPATHIC ANAL PAIN: ICD-10-CM

## 2024-04-12 DIAGNOSIS — Z79.891 ENCOUNTER FOR LONG-TERM METHADONE USE: ICD-10-CM

## 2024-04-12 DIAGNOSIS — M46.1 SACROILIITIS, NOT ELSEWHERE CLASSIFIED: ICD-10-CM

## 2024-04-12 DIAGNOSIS — M75.42 IMPINGEMENT SYNDROME OF LEFT SHOULDER: ICD-10-CM

## 2024-04-12 DIAGNOSIS — K62.89 CHRONIC IDIOPATHIC ANAL PAIN: ICD-10-CM

## 2024-04-12 PROCEDURE — 36415 COLL VENOUS BLD VENIPUNCTURE: CPT

## 2024-04-12 PROCEDURE — 84480 ASSAY TRIIODOTHYRONINE (T3): CPT

## 2024-04-12 PROCEDURE — 84436 ASSAY OF TOTAL THYROXINE: CPT

## 2024-04-12 PROCEDURE — 84403 ASSAY OF TOTAL TESTOSTERONE: CPT

## 2024-04-12 PROCEDURE — 84443 ASSAY THYROID STIM HORMONE: CPT

## 2024-04-13 LAB
T3 SERPL-MCNC: 106 NG/DL (ref 80–200)
T4 SERPL-MCNC: 8.92 MCG/DL (ref 4.5–11.7)
TESTOST SERPL-MCNC: 419 NG/DL (ref 193–740)
TSH SERPL DL<=0.05 MIU/L-ACNC: 2.26 UIU/ML (ref 0.27–4.2)

## 2024-05-01 DIAGNOSIS — Z95.818 STATUS POST PLACEMENT OF IMPLANTABLE LOOP RECORDER: Primary | ICD-10-CM

## 2024-05-01 DIAGNOSIS — I63.9 CRYPTOGENIC STROKE (HCC): ICD-10-CM

## 2024-05-01 PROCEDURE — 93298 REM INTERROG DEV EVAL SCRMS: CPT | Performed by: CLINICAL NURSE SPECIALIST

## 2024-05-03 ENCOUNTER — OFFICE VISIT (OUTPATIENT)
Dept: INTERNAL MEDICINE | Age: 79
End: 2024-05-03

## 2024-05-03 ENCOUNTER — HOSPITAL ENCOUNTER (EMERGENCY)
Age: 79
Discharge: HOME | End: 2024-05-03
Payer: MEDICARE

## 2024-05-03 ENCOUNTER — APPOINTMENT (OUTPATIENT)
Dept: CT IMAGING | Age: 79
End: 2024-05-03
Payer: MEDICARE

## 2024-05-03 VITALS
BODY MASS INDEX: 27.4 KG/M2 | OXYGEN SATURATION: 95 % | HEART RATE: 85 BPM | SYSTOLIC BLOOD PRESSURE: 120 MMHG | WEIGHT: 202 LBS | DIASTOLIC BLOOD PRESSURE: 78 MMHG | TEMPERATURE: 97.4 F

## 2024-05-03 VITALS
HEART RATE: 64 BPM | RESPIRATION RATE: 20 BRPM | SYSTOLIC BLOOD PRESSURE: 145 MMHG | TEMPERATURE: 97.4 F | DIASTOLIC BLOOD PRESSURE: 75 MMHG | OXYGEN SATURATION: 96 %

## 2024-05-03 DIAGNOSIS — N20.1 URETERIC STONE: Primary | ICD-10-CM

## 2024-05-03 DIAGNOSIS — R31.9 HEMATURIA, UNSPECIFIED TYPE: ICD-10-CM

## 2024-05-03 DIAGNOSIS — R31.9 HEMATURIA, UNSPECIFIED TYPE: Primary | ICD-10-CM

## 2024-05-03 LAB
ALBUMIN SERPL-MCNC: 4.5 G/DL (ref 3.5–5.2)
ALP SERPL-CCNC: 80 U/L (ref 40–130)
ALT SERPL-CCNC: 16 U/L (ref 5–41)
AMORPH SED URNS QL MICRO: ABNORMAL /HPF
ANION GAP SERPL CALCULATED.3IONS-SCNC: 12 MMOL/L (ref 7–19)
AST SERPL-CCNC: 17 U/L (ref 5–40)
BACTERIA #/AREA URNS HPF: ABNORMAL /HPF
BASOPHILS # BLD: 0.1 K/UL (ref 0–0.2)
BASOPHILS NFR BLD: 0.7 % (ref 0–1)
BILIRUB SERPL-MCNC: 1.7 MG/DL (ref 0.2–1.2)
BILIRUB UR QL STRIP: ABNORMAL
BUN SERPL-MCNC: 20 MG/DL (ref 8–23)
CALCIUM SERPL-MCNC: 9.9 MG/DL (ref 8.8–10.2)
CHLORIDE SERPL-SCNC: 106 MMOL/L (ref 98–111)
CLARITY UR: ABNORMAL
CO2 SERPL-SCNC: 24 MMOL/L (ref 22–29)
COLOR UR: ABNORMAL
CREAT SERPL-MCNC: 1.2 MG/DL (ref 0.5–1.2)
EOSINOPHIL # BLD: 0.1 K/UL (ref 0–0.6)
EOSINOPHIL NFR BLD: 0.9 % (ref 0–5)
ERYTHROCYTE [DISTWIDTH] IN BLOOD BY AUTOMATED COUNT: 13 % (ref 11.5–14.5)
GLUCOSE SERPL-MCNC: 111 MG/DL (ref 74–109)
GLUCOSE UR STRIP.AUTO-MCNC: NEGATIVE MG/DL
HCT VFR BLD AUTO: 50.4 % (ref 42–52)
HGB BLD-MCNC: 16.4 G/DL (ref 14–18)
HGB UR STRIP.AUTO-MCNC: ABNORMAL MG/L
IMM GRANULOCYTES # BLD: 0 K/UL
KETONES UR STRIP.AUTO-MCNC: NEGATIVE MG/DL
LEUKOCYTE ESTERASE UR QL STRIP.AUTO: ABNORMAL
LYMPHOCYTES # BLD: 2.3 K/UL (ref 1.1–4.5)
LYMPHOCYTES NFR BLD: 28.2 % (ref 20–40)
MCH RBC QN AUTO: 31.7 PG (ref 27–31)
MCHC RBC AUTO-ENTMCNC: 32.5 G/DL (ref 33–37)
MCV RBC AUTO: 97.3 FL (ref 80–94)
MONOCYTES # BLD: 0.6 K/UL (ref 0–0.9)
MONOCYTES NFR BLD: 6.8 % (ref 0–10)
NEUTROPHILS # BLD: 5.1 K/UL (ref 1.5–7.5)
NEUTS SEG NFR BLD: 63 % (ref 50–65)
NITRITE UR QL STRIP.AUTO: POSITIVE
PH UR STRIP.AUTO: 5 [PH] (ref 5–8)
PLATELET # BLD AUTO: 218 K/UL (ref 130–400)
PMV BLD AUTO: 9.8 FL (ref 9.4–12.4)
POTASSIUM SERPL-SCNC: 4.6 MMOL/L (ref 3.5–5)
PROT SERPL-MCNC: 7.7 G/DL (ref 6.6–8.7)
PROT UR STRIP.AUTO-MCNC: 100 MG/DL
RBC # BLD AUTO: 5.18 M/UL (ref 4.7–6.1)
RBC #/AREA URNS HPF: ABNORMAL /HPF (ref 0–2)
SODIUM SERPL-SCNC: 142 MMOL/L (ref 136–145)
SP GR UR STRIP.AUTO: 1.02 (ref 1–1.03)
SQUAMOUS #/AREA URNS HPF: ABNORMAL /HPF
UROBILINOGEN UR STRIP.AUTO-MCNC: 0.2 E.U./DL
WBC # BLD AUTO: 8.1 K/UL (ref 4.8–10.8)
WBC #/AREA URNS HPF: ABNORMAL /HPF (ref 0–5)

## 2024-05-03 PROCEDURE — 99285 EMERGENCY DEPT VISIT HI MDM: CPT

## 2024-05-03 PROCEDURE — 2580000003 HC RX 258: Performed by: PHYSICIAN ASSISTANT

## 2024-05-03 PROCEDURE — 36415 COLL VENOUS BLD VENIPUNCTURE: CPT

## 2024-05-03 PROCEDURE — 6360000004 HC RX CONTRAST MEDICATION: Performed by: PHYSICIAN ASSISTANT

## 2024-05-03 PROCEDURE — 81001 URINALYSIS AUTO W/SCOPE: CPT

## 2024-05-03 PROCEDURE — 85025 COMPLETE CBC W/AUTO DIFF WBC: CPT

## 2024-05-03 PROCEDURE — 80053 COMPREHEN METABOLIC PANEL: CPT

## 2024-05-03 PROCEDURE — 6360000002 HC RX W HCPCS: Performed by: PHYSICIAN ASSISTANT

## 2024-05-03 PROCEDURE — 87086 URINE CULTURE/COLONY COUNT: CPT

## 2024-05-03 PROCEDURE — 96374 THER/PROPH/DIAG INJ IV PUSH: CPT

## 2024-05-03 PROCEDURE — 74178 CT ABD&PLV WO CNTR FLWD CNTR: CPT | Performed by: PHYSICIAN ASSISTANT

## 2024-05-03 RX ORDER — HYDROCODONE BITARTRATE AND ACETAMINOPHEN 5; 325 MG/1; MG/1
1 TABLET ORAL EVERY 6 HOURS PRN
Qty: 12 TABLET | Refills: 0 | Status: SHIPPED | OUTPATIENT
Start: 2024-05-03 | End: 2024-05-10

## 2024-05-03 RX ORDER — TAMSULOSIN HYDROCHLORIDE 0.4 MG/1
0.4 CAPSULE ORAL DAILY
Qty: 30 CAPSULE | Refills: 0 | Status: SHIPPED | OUTPATIENT
Start: 2024-05-03

## 2024-05-03 RX ORDER — ONDANSETRON 4 MG/1
4 TABLET, ORALLY DISINTEGRATING ORAL 3 TIMES DAILY PRN
Qty: 21 TABLET | Refills: 0 | Status: SHIPPED | OUTPATIENT
Start: 2024-05-03

## 2024-05-03 RX ORDER — CEPHALEXIN 500 MG/1
500 CAPSULE ORAL 2 TIMES DAILY
Qty: 14 CAPSULE | Refills: 0 | Status: SHIPPED | OUTPATIENT
Start: 2024-05-03 | End: 2024-05-10

## 2024-05-03 RX ADMIN — IOPAMIDOL 70 ML: 755 INJECTION, SOLUTION INTRAVENOUS at 18:53

## 2024-05-03 RX ADMIN — WATER 1000 MG: 1 INJECTION INTRAMUSCULAR; INTRAVENOUS; SUBCUTANEOUS at 20:45

## 2024-05-03 SDOH — ECONOMIC STABILITY: FOOD INSECURITY: WITHIN THE PAST 12 MONTHS, YOU WORRIED THAT YOUR FOOD WOULD RUN OUT BEFORE YOU GOT MONEY TO BUY MORE.: NEVER TRUE

## 2024-05-03 SDOH — ECONOMIC STABILITY: FOOD INSECURITY: WITHIN THE PAST 12 MONTHS, THE FOOD YOU BOUGHT JUST DIDN'T LAST AND YOU DIDN'T HAVE MONEY TO GET MORE.: NEVER TRUE

## 2024-05-03 SDOH — ECONOMIC STABILITY: INCOME INSECURITY: HOW HARD IS IT FOR YOU TO PAY FOR THE VERY BASICS LIKE FOOD, HOUSING, MEDICAL CARE, AND HEATING?: NOT HARD AT ALL

## 2024-05-03 ASSESSMENT — PATIENT HEALTH QUESTIONNAIRE - PHQ9
7. TROUBLE CONCENTRATING ON THINGS, SUCH AS READING THE NEWSPAPER OR WATCHING TELEVISION: NOT AT ALL
4. FEELING TIRED OR HAVING LITTLE ENERGY: NOT AT ALL
6. FEELING BAD ABOUT YOURSELF - OR THAT YOU ARE A FAILURE OR HAVE LET YOURSELF OR YOUR FAMILY DOWN: NOT AT ALL
SUM OF ALL RESPONSES TO PHQ QUESTIONS 1-9: 0
10. IF YOU CHECKED OFF ANY PROBLEMS, HOW DIFFICULT HAVE THESE PROBLEMS MADE IT FOR YOU TO DO YOUR WORK, TAKE CARE OF THINGS AT HOME, OR GET ALONG WITH OTHER PEOPLE: NOT DIFFICULT AT ALL
SUM OF ALL RESPONSES TO PHQ QUESTIONS 1-9: 0
2. FEELING DOWN, DEPRESSED OR HOPELESS: NOT AT ALL
9. THOUGHTS THAT YOU WOULD BE BETTER OFF DEAD, OR OF HURTING YOURSELF: NOT AT ALL
5. POOR APPETITE OR OVEREATING: NOT AT ALL
SUM OF ALL RESPONSES TO PHQ9 QUESTIONS 1 & 2: 0
8. MOVING OR SPEAKING SO SLOWLY THAT OTHER PEOPLE COULD HAVE NOTICED. OR THE OPPOSITE, BEING SO FIGETY OR RESTLESS THAT YOU HAVE BEEN MOVING AROUND A LOT MORE THAN USUAL: NOT AT ALL
3. TROUBLE FALLING OR STAYING ASLEEP: NOT AT ALL
SUM OF ALL RESPONSES TO PHQ QUESTIONS 1-9: 0
SUM OF ALL RESPONSES TO PHQ QUESTIONS 1-9: 0
1. LITTLE INTEREST OR PLEASURE IN DOING THINGS: NOT AT ALL

## 2024-05-03 ASSESSMENT — ENCOUNTER SYMPTOMS
COUGH: 0
DIARRHEA: 0
ABDOMINAL PAIN: 0
SHORTNESS OF BREATH: 0
VOMITING: 0
NAUSEA: 0

## 2024-05-03 NOTE — PROGRESS NOTES
Pt presents today with concern for hematuria. He noticed this about two days ago. He reports no pain. No fall or injury. He is on plavix. Has history of CVA. Here in office his urine sample is thick and bloody. POCT UA would not read. Family member with pt reports that he has not been drinking very much water. Due to the history of blood thinner and the urine appearance today in office advised to go to ER for higher level of care. Report called to [facility].

## 2024-05-04 NOTE — ED PROVIDER NOTES
days, Disp-14 capsule, R-0Normal      tamsulosin (FLOMAX) 0.4 MG capsule Take 1 capsule by mouth daily, Disp-30 capsule, R-0Normal      ondansetron (ZOFRAN-ODT) 4 MG disintegrating tablet Take 1 tablet by mouth 3 times daily as needed for Nausea or Vomiting, Disp-21 tablet, R-0Normal      HYDROcodone-acetaminophen (NORCO) 5-325 MG per tablet Take 1 tablet by mouth every 6 hours as needed for Pain for up to 7 days. Max Daily Amount: 4 tablets, Disp-12 tablet, R-0Normal                (Please note that portions of this note were completed with a voice recognition program.  Efforts were made to edit thedictations but occasionally words are mis-transcribed.)    ELVIS Barroso (electronically signed)     Amor Vinson PA  05/03/24 [id number]

## 2024-05-05 LAB — BACTERIA UR CULT: NORMAL

## 2024-05-07 ENCOUNTER — TELEPHONE (OUTPATIENT)
Dept: NEUROLOGY | Age: 79
End: 2024-05-07

## 2024-05-07 NOTE — TELEPHONE ENCOUNTER
Pt wife left message with new symptoms she has noticed in the pt     Stroke 2/2023    Got up yesterday morning and thought it was evening and then took a nap alter and thought it was morning    Very confused overall, but pt wife is worried these are new stroke symptoms and is requesting a scan be ordered.    Eyes not opening as much as they used to be and she states he has to try harder to focus on objects.     Patient was recently seen in the ED 5/3 for kidney stone and possible UTI. He is on an antibiotic     [phone]

## 2024-05-08 ENCOUNTER — HOSPITAL ENCOUNTER (OUTPATIENT)
Dept: GENERAL RADIOLOGY | Age: 79
Discharge: HOME | End: 2024-05-08
Payer: MEDICARE

## 2024-05-08 ENCOUNTER — HOSPITAL ENCOUNTER (OUTPATIENT)
Dept: CT IMAGING | Age: 79
Discharge: HOME | End: 2024-05-08
Payer: MEDICARE

## 2024-05-08 ENCOUNTER — OFFICE VISIT (OUTPATIENT)
Dept: INTERNAL MEDICINE | Age: 79
End: 2024-05-08
Payer: MEDICARE

## 2024-05-08 ENCOUNTER — TELEPHONE (OUTPATIENT)
Dept: NEUROLOGY | Age: 79
End: 2024-05-08

## 2024-05-08 VITALS
BODY MASS INDEX: 27.94 KG/M2 | DIASTOLIC BLOOD PRESSURE: 72 MMHG | HEART RATE: 87 BPM | SYSTOLIC BLOOD PRESSURE: 122 MMHG | WEIGHT: 206 LBS | OXYGEN SATURATION: 95 %

## 2024-05-08 DIAGNOSIS — R41.0 CONFUSION: ICD-10-CM

## 2024-05-08 DIAGNOSIS — Z95.818 STATUS POST PLACEMENT OF IMPLANTABLE LOOP RECORDER: Primary | ICD-10-CM

## 2024-05-08 DIAGNOSIS — H53.8 BLURRED VISION, BILATERAL: ICD-10-CM

## 2024-05-08 DIAGNOSIS — N20.1 LEFT URETERAL STONE: ICD-10-CM

## 2024-05-08 DIAGNOSIS — I63.9 CRYPTOGENIC STROKE (HCC): ICD-10-CM

## 2024-05-08 DIAGNOSIS — N20.1 LEFT URETERAL STONE: Primary | ICD-10-CM

## 2024-05-08 PROCEDURE — 1123F ACP DISCUSS/DSCN MKR DOCD: CPT | Performed by: NURSE PRACTITIONER

## 2024-05-08 PROCEDURE — G8427 DOCREV CUR MEDS BY ELIG CLIN: HCPCS | Performed by: NURSE PRACTITIONER

## 2024-05-08 PROCEDURE — 70450 CT HEAD/BRAIN W/O DYE: CPT

## 2024-05-08 PROCEDURE — 4004F PT TOBACCO SCREEN RCVD TLK: CPT | Performed by: NURSE PRACTITIONER

## 2024-05-08 PROCEDURE — 74018 RADEX ABDOMEN 1 VIEW: CPT

## 2024-05-08 PROCEDURE — 99214 OFFICE O/P EST MOD 30 MIN: CPT | Performed by: NURSE PRACTITIONER

## 2024-05-08 PROCEDURE — G8417 CALC BMI ABV UP PARAM F/U: HCPCS | Performed by: NURSE PRACTITIONER

## 2024-05-08 PROCEDURE — 3074F SYST BP LT 130 MM HG: CPT | Performed by: NURSE PRACTITIONER

## 2024-05-08 PROCEDURE — 3078F DIAST BP <80 MM HG: CPT | Performed by: NURSE PRACTITIONER

## 2024-05-08 ASSESSMENT — ENCOUNTER SYMPTOMS
EYE ITCHING: 0
SHORTNESS OF BREATH: 0
EYE DISCHARGE: 0
ABDOMINAL DISTENTION: 0
WHEEZING: 0
CONSTIPATION: 0
DIARRHEA: 0
BLOOD IN STOOL: 0
COLOR CHANGE: 0
COUGH: 0
VOMITING: 0
SORE THROAT: 0
STRIDOR: 0
TROUBLE SWALLOWING: 0
ABDOMINAL PAIN: 0
BACK PAIN: 1
CHOKING: 0
NAUSEA: 0

## 2024-05-08 NOTE — TELEPHONE ENCOUNTER
Patients daughter Naheed called and stated her mother still has not heard back from the office. I let her know that Valery responded to the message at 5pm yesterday and she had been in clinic all day yesterday but she does want the patient to be seen sooner. I tried to schedule him at 10:45 today and then I realized he was able to get in with his PCP at 9:45. She stated she would wait and see what the PCP says and then go from there. The patient is scheduled to see Valery next week. She will call back if he needs to be seen sooner than next week.

## 2024-05-08 NOTE — PATIENT INSTRUCTIONS
1.  Increased confusion we will get a CAT scan today  2.  Dementia likely this is a progression.  Will continue with Namenda for now  3.  Back pain he does have a left ureteral stone but the pain is more in the back we will get a KUB to just check today.  Also there is no infection on his culture from 5 3 ER visit  4.  Visual changes getting CAT scan today  5.  Loop recorder of sent a message to Marilin Alfonso   to ensure there is nothing of concern  PATIENT INSTRUCTIONS:      IT IS VERY IMPORTANT THAT YOU GET YOUR LABS AT LEAST 1 DAY BEFORE YOUR APPT.  WE MAY HAVE TO RESCHEDULE YOU IF YOU WE DONT HAVE THE RESULTS;      WE CAN DISCUSS THE LABS ON THE DAY OF YOUR OFFICE VISIT AND MAKE CHANGES TOGETHER WHILE YOU ARE HERE.  THEN YOU CAN LEAVE WITH A COPY OF THOSE CHANGES.     CALLING YOU BACK WITH ROUTINE RESULTS IS VERY TIME CONSUMING;  I HAVE TO REVIEW THE LABS, THEN YOUR PAST LABS AND OV, THEN MAKE A NOTE TO THE NURSES TO CALL YOU, OR I CALL MYSELF WITH EXTREME ABNORMALITIES WHICH CAN BE TIME CONSUMING AS WELL;  WHEN I HAVE TO TAKE TIME OUT OF ANOTHER DAY FOR YOUR RESULTS THAT IS TIME AWAY FROM OTHER PATIENTS AND DISCUSSING RESULTS OVER THE PHONE IS JUST NOT A GOOD IDEA;  WHILE HERE WE CAN WRITE DOWN THE CHANGES BEFORE YOU LEAVE THE OFFICE;      Life simple 7  1) Manage blood pressure - high blood pressure is a major risk factor for heart disease and stroke. Keeping blood pressure in health range reduces strain on your heart, arteries and kidneys.  Blood pressure goal is less than 130/80.   2) Control cholesterol - contributes to plaque, which can clog arteries and lead to heart disease and stroke. When you control your cholesterol you are giving your arteries their best chance to remain clear.  It is recommended that you get cholesterol lab work done once a year.  3) Reduce blood sugar - most of the food we eat is turning into glucose or blood sugar that our body uses for energy.  Over time, high levels of blood sugar

## 2024-05-08 NOTE — PROGRESS NOTES
[facility]  [facility] INTERNAL MEDICINE  [address]  [address]  [place] [address]  Dept: [phone]  Dept Fax: [phone]  Loc: [phone]    Luis A Keene (:  1945) is a 79 y.o. male,Established patient  with green , here for evaluation of the following chief complaint(s): Altered Mental Status (Doesn't know time of day when he wakes up), Side Pain (Left side; kidney stones; hematuria- went to ER), and Blurred Vision (Far off distances; will come into focus)      Luis A Keene is a 79 y.o. male who presents today for his medical conditions/complaints as noted below.  Luis A Keene is c/robinson Altered Mental Status (Doesn't know time of day when he wakes up), Side Pain (Left side; kidney stones; hematuria- went to ER), and Blurred Vision (Far off distances; will come into focus)        HPI:     Chief Complaint   Patient presents with    Altered Mental Status     Doesn't know time of day when he wakes up    Side Pain     Left side; kidney stones; hematuria- went to ER    Blurred Vision     Far off distances; will come into focus     @pt is with his wife;  he no longer drives;    HPI   Increased confusion the last week or so  doesn't know if it is day ornight;    Dementia he is on namenda was intolerant of aricept;    Back pain  he came to the office and then sent to the ER for hematuria;  was found to have left ureteral stone;  today  he reallly doesn't have abd pain;  but has left flank pain; he doesn't have occult blood right now;   I checked the u/a from Er 5/3 and the culture is negative ;  he was started on flomax   Visual changes;  he said he has at times  blurry vision  5.  Loop recorder is in place  done may of last year ;  was called this am he had incident yesterday but they have been playing phone tag     his wife said his pulse was over 120  ; but today I have looked at the cardiology note from the incident I cannot see the reading but it says no changes in plan

## 2024-05-09 RX ORDER — METOPROLOL SUCCINATE 25 MG/1
25 TABLET, EXTENDED RELEASE ORAL DAILY
Qty: 30 TABLET | Refills: 5 | Status: SHIPPED | OUTPATIENT
Start: 2024-05-09 | End: 2024-09-06

## 2024-05-14 ENCOUNTER — OFFICE VISIT (OUTPATIENT)
Dept: ENT CLINIC | Age: 79
End: 2024-05-14
Payer: MEDICARE

## 2024-05-14 VITALS
WEIGHT: 206 LBS | HEIGHT: 72 IN | SYSTOLIC BLOOD PRESSURE: 124 MMHG | BODY MASS INDEX: 27.9 KG/M2 | DIASTOLIC BLOOD PRESSURE: 68 MMHG

## 2024-05-14 DIAGNOSIS — H61.23 BILATERAL IMPACTED CERUMEN: Primary | ICD-10-CM

## 2024-05-14 PROCEDURE — 69210 REMOVE IMPACTED EAR WAX UNI: CPT | Performed by: PHYSICIAN ASSISTANT

## 2024-05-14 PROCEDURE — G8417 CALC BMI ABV UP PARAM F/U: HCPCS | Performed by: PHYSICIAN ASSISTANT

## 2024-05-14 PROCEDURE — 4004F PT TOBACCO SCREEN RCVD TLK: CPT | Performed by: PHYSICIAN ASSISTANT

## 2024-05-14 PROCEDURE — 3074F SYST BP LT 130 MM HG: CPT | Performed by: PHYSICIAN ASSISTANT

## 2024-05-14 PROCEDURE — 1123F ACP DISCUSS/DSCN MKR DOCD: CPT | Performed by: PHYSICIAN ASSISTANT

## 2024-05-14 PROCEDURE — G8427 DOCREV CUR MEDS BY ELIG CLIN: HCPCS | Performed by: PHYSICIAN ASSISTANT

## 2024-05-14 PROCEDURE — 3078F DIAST BP <80 MM HG: CPT | Performed by: PHYSICIAN ASSISTANT

## 2024-05-14 PROCEDURE — 99212 OFFICE O/P EST SF 10 MIN: CPT | Performed by: PHYSICIAN ASSISTANT

## 2024-05-14 RX ORDER — CLOTRIMAZOLE 1 G/ML
SOLUTION TOPICAL
Qty: 15 ML | Refills: 0 | Status: SHIPPED | OUTPATIENT
Start: 2024-05-14

## 2024-05-14 NOTE — PROGRESS NOTES
Mr. Keene is a pleasant 79-year-old  male that presents for a 3-month cerumen check.  Patient reports that he is having some drainage from the right ear as well as some dysfunction from his hearing aids.      Physical examination with microscope confirmed bilateral cerumen impactions to be present.  The left side was removed with alligator graspers and suction with no complications.  The TM appeared to be normal.  Right side demonstrated cerumen impaction was also removed with suction and alligator graspers.  After disimpaction, the patient was noted to have evidence of OE that appeared to be secondary to yeast based on appearance.  Neck exam demonstrated no lymphadenopathy or thyromegaly.      Impression: Successful cerumen disimpaction with microscopy and instrumentation-bilateral, low-grade fungal OE of the right ear    Plan: I will place the patient on Lotrimin solution for the right ear.  He is to follow-up in 3 months for cerumen check and reevaluation of the right ear.  He was reminded to call if he has any questions or concerns.      Electronically signed by ARIANA KNIGHT PA-C on 5/14/24 at 12:22 PM LINAT

## 2024-05-16 ENCOUNTER — OFFICE VISIT (OUTPATIENT)
Dept: NEUROLOGY | Age: 79
End: 2024-05-16
Payer: MEDICARE

## 2024-05-16 VITALS
HEART RATE: 63 BPM | BODY MASS INDEX: 27.9 KG/M2 | WEIGHT: 206 LBS | OXYGEN SATURATION: 97 % | HEIGHT: 72 IN | DIASTOLIC BLOOD PRESSURE: 78 MMHG | SYSTOLIC BLOOD PRESSURE: 137 MMHG

## 2024-05-16 DIAGNOSIS — I63.81 THALAMIC STROKE (HCC): ICD-10-CM

## 2024-05-16 DIAGNOSIS — I67.9 CEREBROVASCULAR SMALL VESSEL DISEASE: ICD-10-CM

## 2024-05-16 DIAGNOSIS — Z86.73 HISTORY OF CVA IN ADULTHOOD: ICD-10-CM

## 2024-05-16 DIAGNOSIS — F03.90 DEMENTIA, UNSPECIFIED DEMENTIA SEVERITY, UNSPECIFIED DEMENTIA TYPE, UNSPECIFIED WHETHER BEHAVIORAL, PSYCHOTIC, OR MOOD DISTURBANCE OR ANXIETY (HCC): Primary | ICD-10-CM

## 2024-05-16 PROCEDURE — 99214 OFFICE O/P EST MOD 30 MIN: CPT | Performed by: NURSE PRACTITIONER

## 2024-05-16 PROCEDURE — G8427 DOCREV CUR MEDS BY ELIG CLIN: HCPCS | Performed by: NURSE PRACTITIONER

## 2024-05-16 PROCEDURE — G8417 CALC BMI ABV UP PARAM F/U: HCPCS | Performed by: NURSE PRACTITIONER

## 2024-05-16 PROCEDURE — 3078F DIAST BP <80 MM HG: CPT | Performed by: NURSE PRACTITIONER

## 2024-05-16 PROCEDURE — 4004F PT TOBACCO SCREEN RCVD TLK: CPT | Performed by: NURSE PRACTITIONER

## 2024-05-16 PROCEDURE — 3075F SYST BP GE 130 - 139MM HG: CPT | Performed by: NURSE PRACTITIONER

## 2024-05-16 PROCEDURE — 1123F ACP DISCUSS/DSCN MKR DOCD: CPT | Performed by: NURSE PRACTITIONER

## 2024-06-05 DIAGNOSIS — I63.9 CRYPTOGENIC STROKE (HCC): ICD-10-CM

## 2024-06-05 DIAGNOSIS — Z95.818 STATUS POST PLACEMENT OF IMPLANTABLE LOOP RECORDER: Primary | ICD-10-CM

## 2024-06-10 DIAGNOSIS — Z12.5 SCREENING FOR PROSTATE CANCER: ICD-10-CM

## 2024-06-10 DIAGNOSIS — R53.83 FATIGUE, UNSPECIFIED TYPE: Primary | ICD-10-CM

## 2024-06-10 DIAGNOSIS — E55.9 VITAMIN D DEFICIENCY: ICD-10-CM

## 2024-06-10 DIAGNOSIS — R53.83 FATIGUE, UNSPECIFIED TYPE: ICD-10-CM

## 2024-06-10 LAB
25(OH)D3 SERPL-MCNC: 60 NG/ML
ALBUMIN SERPL-MCNC: 4.2 G/DL (ref 3.5–5.2)
ALP SERPL-CCNC: 61 U/L (ref 40–130)
ALT SERPL-CCNC: 12 U/L (ref 5–41)
ANION GAP SERPL CALCULATED.3IONS-SCNC: 9 MMOL/L (ref 7–19)
AST SERPL-CCNC: 14 U/L (ref 5–40)
BASOPHILS # BLD: 0.1 K/UL (ref 0–0.2)
BASOPHILS NFR BLD: 0.9 % (ref 0–1)
BILIRUB SERPL-MCNC: 1.2 MG/DL (ref 0.2–1.2)
BILIRUB UR QL STRIP: NEGATIVE
BUN SERPL-MCNC: 23 MG/DL (ref 8–23)
CALCIUM SERPL-MCNC: 9.6 MG/DL (ref 8.8–10.2)
CHLORIDE SERPL-SCNC: 109 MMOL/L (ref 98–111)
CHOLEST SERPL-MCNC: 109 MG/DL (ref 160–199)
CLARITY UR: CLEAR
CO2 SERPL-SCNC: 26 MMOL/L (ref 22–29)
COLOR UR: YELLOW
CREAT SERPL-MCNC: 1.2 MG/DL (ref 0.5–1.2)
EOSINOPHIL # BLD: 0.1 K/UL (ref 0–0.6)
EOSINOPHIL NFR BLD: 1.9 % (ref 0–5)
ERYTHROCYTE [DISTWIDTH] IN BLOOD BY AUTOMATED COUNT: 13 % (ref 11.5–14.5)
GLUCOSE SERPL-MCNC: 99 MG/DL (ref 74–109)
GLUCOSE UR STRIP.AUTO-MCNC: NEGATIVE MG/DL
HCT VFR BLD AUTO: 41.4 % (ref 42–52)
HDLC SERPL-MCNC: 40 MG/DL (ref 55–121)
HGB BLD-MCNC: 13.2 G/DL (ref 14–18)
HGB UR STRIP.AUTO-MCNC: NEGATIVE MG/L
IMM GRANULOCYTES # BLD: 0 K/UL
KETONES UR STRIP.AUTO-MCNC: NEGATIVE MG/DL
LDLC SERPL CALC-MCNC: 49 MG/DL
LEUKOCYTE ESTERASE UR QL STRIP.AUTO: NEGATIVE
LYMPHOCYTES # BLD: 1.9 K/UL (ref 1.1–4.5)
LYMPHOCYTES NFR BLD: 34.2 % (ref 20–40)
MCH RBC QN AUTO: 31.1 PG (ref 27–31)
MCHC RBC AUTO-ENTMCNC: 31.9 G/DL (ref 33–37)
MCV RBC AUTO: 97.4 FL (ref 80–94)
MONOCYTES # BLD: 0.4 K/UL (ref 0–0.9)
MONOCYTES NFR BLD: 7.7 % (ref 0–10)
NEUTROPHILS # BLD: 3.1 K/UL (ref 1.5–7.5)
NEUTS SEG NFR BLD: 55.1 % (ref 50–65)
NITRITE UR QL STRIP.AUTO: NEGATIVE
PH UR STRIP.AUTO: 6.5 [PH] (ref 5–8)
PLATELET # BLD AUTO: 160 K/UL (ref 130–400)
PMV BLD AUTO: 10.8 FL (ref 9.4–12.4)
POTASSIUM SERPL-SCNC: 4.5 MMOL/L (ref 3.5–5)
PROT SERPL-MCNC: 6.7 G/DL (ref 6.6–8.7)
PROT UR STRIP.AUTO-MCNC: ABNORMAL MG/DL
PSA SERPL-MCNC: 1.3 NG/ML (ref 0–4)
RBC # BLD AUTO: 4.25 M/UL (ref 4.7–6.1)
SODIUM SERPL-SCNC: 144 MMOL/L (ref 136–145)
SP GR UR STRIP.AUTO: 1.02 (ref 1–1.03)
TRIGL SERPL-MCNC: 100 MG/DL (ref 0–149)
TSH SERPL DL<=0.005 MIU/L-ACNC: 1.66 UIU/ML (ref 0.27–4.2)
UROBILINOGEN UR STRIP.AUTO-MCNC: 0.2 E.U./DL
WBC # BLD AUTO: 5.7 K/UL (ref 4.8–10.8)

## 2024-06-12 RX ORDER — LISINOPRIL 2.5 MG/1
2.5 TABLET ORAL DAILY
Qty: 90 TABLET | Refills: 1 | Status: SHIPPED | OUTPATIENT
Start: 2024-06-12 | End: 2024-06-13 | Stop reason: SDUPTHER

## 2024-06-12 NOTE — TELEPHONE ENCOUNTER
Luis A called requesting a refill of the below medication which has been pended for you:     Requested Prescriptions     Pending Prescriptions Disp Refills    lisinopril (PRINIVIL;ZESTRIL) 2.5 MG tablet [Pharmacy Med Name: LISINOPRIL 2.5MG TABLETS] 90 tablet 1     Sig: TAKE 1 TABLET BY MOUTH DAILY       Last Appointment Date: 5/8/2024  Next Appointment Date: 6/13/2024    No Known Allergies

## 2024-06-13 ENCOUNTER — OFFICE VISIT (OUTPATIENT)
Dept: CARDIOLOGY CLINIC | Age: 79
End: 2024-06-13
Payer: MEDICARE

## 2024-06-13 ENCOUNTER — OFFICE VISIT (OUTPATIENT)
Dept: INTERNAL MEDICINE | Age: 79
End: 2024-06-13
Payer: MEDICARE

## 2024-06-13 VITALS
OXYGEN SATURATION: 96 % | SYSTOLIC BLOOD PRESSURE: 110 MMHG | WEIGHT: 208 LBS | HEART RATE: 61 BPM | HEIGHT: 72 IN | DIASTOLIC BLOOD PRESSURE: 62 MMHG | BODY MASS INDEX: 28.17 KG/M2

## 2024-06-13 VITALS
OXYGEN SATURATION: 98 % | WEIGHT: 205.8 LBS | BODY MASS INDEX: 27.91 KG/M2 | SYSTOLIC BLOOD PRESSURE: 102 MMHG | HEART RATE: 59 BPM | DIASTOLIC BLOOD PRESSURE: 60 MMHG

## 2024-06-13 DIAGNOSIS — I25.10 CORONARY ARTERY DISEASE INVOLVING NATIVE CORONARY ARTERY OF NATIVE HEART WITHOUT ANGINA PECTORIS: ICD-10-CM

## 2024-06-13 DIAGNOSIS — E55.9 VITAMIN D DEFICIENCY: ICD-10-CM

## 2024-06-13 DIAGNOSIS — D64.9 ANEMIA, UNSPECIFIED TYPE: ICD-10-CM

## 2024-06-13 DIAGNOSIS — I71.40 ABDOMINAL AORTIC ANEURYSM (AAA) WITHOUT RUPTURE, UNSPECIFIED PART (HCC): ICD-10-CM

## 2024-06-13 DIAGNOSIS — I71.40 ABDOMINAL AORTIC ANEURYSM (AAA) WITHOUT RUPTURE, UNSPECIFIED PART (HCC): Primary | ICD-10-CM

## 2024-06-13 DIAGNOSIS — E78.00 PURE HYPERCHOLESTEROLEMIA: ICD-10-CM

## 2024-06-13 DIAGNOSIS — Z98.890 HISTORY OF LOOP RECORDER: ICD-10-CM

## 2024-06-13 DIAGNOSIS — G89.4 CHRONIC PAIN SYNDROME: ICD-10-CM

## 2024-06-13 DIAGNOSIS — F33.41 RECURRENT MAJOR DEPRESSIVE DISORDER, IN PARTIAL REMISSION (HCC): ICD-10-CM

## 2024-06-13 DIAGNOSIS — J41.0 SIMPLE CHRONIC BRONCHITIS (HCC): ICD-10-CM

## 2024-06-13 DIAGNOSIS — F03.B0 MODERATE DEMENTIA WITHOUT BEHAVIORAL DISTURBANCE, PSYCHOTIC DISTURBANCE, MOOD DISTURBANCE, OR ANXIETY, UNSPECIFIED DEMENTIA TYPE (HCC): Primary | ICD-10-CM

## 2024-06-13 DIAGNOSIS — Z72.0 TOBACCO ABUSE: ICD-10-CM

## 2024-06-13 DIAGNOSIS — I10 HTN (HYPERTENSION), BENIGN: ICD-10-CM

## 2024-06-13 DIAGNOSIS — G62.9 NEUROPATHY: ICD-10-CM

## 2024-06-13 DIAGNOSIS — I63.9 CEREBROVASCULAR ACCIDENT (CVA), UNSPECIFIED MECHANISM (HCC): ICD-10-CM

## 2024-06-13 DIAGNOSIS — Z95.5 H/O HEART ARTERY STENT: ICD-10-CM

## 2024-06-13 DIAGNOSIS — Z13.6 SCREENING FOR AAA (ABDOMINAL AORTIC ANEURYSM): ICD-10-CM

## 2024-06-13 LAB
FERRITIN SERPL-MCNC: 166.5 NG/ML (ref 30–400)
IRON SERPL-MCNC: 105 UG/DL (ref 59–158)

## 2024-06-13 PROCEDURE — 1123F ACP DISCUSS/DSCN MKR DOCD: CPT | Performed by: INTERNAL MEDICINE

## 2024-06-13 PROCEDURE — G8417 CALC BMI ABV UP PARAM F/U: HCPCS | Performed by: NURSE PRACTITIONER

## 2024-06-13 PROCEDURE — 99214 OFFICE O/P EST MOD 30 MIN: CPT | Performed by: INTERNAL MEDICINE

## 2024-06-13 PROCEDURE — 3074F SYST BP LT 130 MM HG: CPT | Performed by: INTERNAL MEDICINE

## 2024-06-13 PROCEDURE — 3078F DIAST BP <80 MM HG: CPT | Performed by: INTERNAL MEDICINE

## 2024-06-13 PROCEDURE — G8427 DOCREV CUR MEDS BY ELIG CLIN: HCPCS | Performed by: INTERNAL MEDICINE

## 2024-06-13 PROCEDURE — 4004F PT TOBACCO SCREEN RCVD TLK: CPT | Performed by: NURSE PRACTITIONER

## 2024-06-13 PROCEDURE — G8417 CALC BMI ABV UP PARAM F/U: HCPCS | Performed by: INTERNAL MEDICINE

## 2024-06-13 PROCEDURE — 3023F SPIROM DOC REV: CPT | Performed by: NURSE PRACTITIONER

## 2024-06-13 PROCEDURE — 1123F ACP DISCUSS/DSCN MKR DOCD: CPT | Performed by: NURSE PRACTITIONER

## 2024-06-13 PROCEDURE — G8427 DOCREV CUR MEDS BY ELIG CLIN: HCPCS | Performed by: NURSE PRACTITIONER

## 2024-06-13 PROCEDURE — 3078F DIAST BP <80 MM HG: CPT | Performed by: NURSE PRACTITIONER

## 2024-06-13 PROCEDURE — 99214 OFFICE O/P EST MOD 30 MIN: CPT | Performed by: NURSE PRACTITIONER

## 2024-06-13 PROCEDURE — 4004F PT TOBACCO SCREEN RCVD TLK: CPT | Performed by: INTERNAL MEDICINE

## 2024-06-13 PROCEDURE — 3074F SYST BP LT 130 MM HG: CPT | Performed by: NURSE PRACTITIONER

## 2024-06-13 RX ORDER — ERGOCALCIFEROL 1.25 MG/1
CAPSULE ORAL
Qty: 12 CAPSULE | Refills: 1 | Status: SHIPPED | OUTPATIENT
Start: 2024-06-13

## 2024-06-13 RX ORDER — SULFAMETHOXAZOLE AND TRIMETHOPRIM 800; 160 MG/1; MG/1
1 TABLET ORAL 2 TIMES DAILY
Qty: 14 TABLET | Refills: 0 | Status: SHIPPED | OUTPATIENT
Start: 2024-06-13 | End: 2024-06-20

## 2024-06-13 RX ORDER — CLOPIDOGREL BISULFATE 75 MG/1
75 TABLET ORAL DAILY
Qty: 90 TABLET | Refills: 1 | Status: SHIPPED | OUTPATIENT
Start: 2024-06-13

## 2024-06-13 RX ORDER — LOVASTATIN 20 MG/1
20 TABLET ORAL NIGHTLY
Qty: 90 TABLET | Refills: 1 | Status: SHIPPED | OUTPATIENT
Start: 2024-06-13

## 2024-06-13 RX ORDER — GABAPENTIN 300 MG/1
CAPSULE ORAL
Qty: 90 CAPSULE | Refills: 5 | Status: SHIPPED | OUTPATIENT
Start: 2024-06-13 | End: 2025-01-02

## 2024-06-13 RX ORDER — FLUTICASONE PROPIONATE AND SALMETEROL 250; 50 UG/1; UG/1
POWDER RESPIRATORY (INHALATION)
Qty: 60 EACH | Refills: 2 | Status: SHIPPED | OUTPATIENT
Start: 2024-06-13

## 2024-06-13 RX ORDER — LISINOPRIL 2.5 MG/1
2.5 TABLET ORAL DAILY
Qty: 90 TABLET | Refills: 1 | Status: SHIPPED | OUTPATIENT
Start: 2024-06-13

## 2024-06-13 ASSESSMENT — ENCOUNTER SYMPTOMS
STRIDOR: 0
DIARRHEA: 0
NAUSEA: 0
CONSTIPATION: 0
DIARRHEA: 0
BLOOD IN STOOL: 0
SHORTNESS OF BREATH: 0
WHEEZING: 0
ABDOMINAL DISTENTION: 0
VOMITING: 0
NAUSEA: 0
SHORTNESS OF BREATH: 0
VOMITING: 0
EYE DISCHARGE: 0
EYES NEGATIVE: 1
BACK PAIN: 1
COLOR CHANGE: 0
RESPIRATORY NEGATIVE: 1
ABDOMINAL PAIN: 0
TROUBLE SWALLOWING: 0
GASTROINTESTINAL NEGATIVE: 1
COUGH: 0
SORE THROAT: 0
CHOKING: 0
EYE ITCHING: 0

## 2024-06-13 NOTE — PROGRESS NOTES
[facility]                            Date:  6/13/2024  Patient: Luis A Keene  Age:  79 y.o., 1945      Reason for evaluation:         SUBJECTIVE:    Here today follow-up assessment coronary artery disease hypertension previous loop recorder implantation tobacco abuse hyperlipidemia previous stroke previous aneurysm.  Loop recorder reportedly per phone call partially out of the chest and we advised him to come to the office for further assessment no fever chills or drainage reported overall feels well otherwise.    Review of Systems   Constitutional: Negative.  Negative for chills, fever and unexpected weight change.   HENT: Negative.     Eyes: Negative.    Respiratory: Negative.  Negative for shortness of breath.    Cardiovascular: Negative.  Negative for chest pain.   Gastrointestinal: Negative.  Negative for diarrhea, nausea and vomiting.   Endocrine: Negative.    Genitourinary: Negative.    Musculoskeletal: Negative.    Skin: Negative.    Neurological: Negative.    All other systems reviewed and are negative.        OBJECTIVE:    /60   Pulse 59   Wt 93.4 kg (205 lb 12.8 oz)   SpO2 98%   BMI 27.91 kg/m²     Labs:   CBC: No results for input(s): \"WBC\", \"HGB\", \"HCT\", \"PLT\" in the last 72 hours.  BMP:No results for input(s): \"NA\", \"K\", \"CO2\", \"BUN\", \"CREATININE\", \"LABGLOM\", \"GLUCOSE\" in the last 72 hours.  BNP: No results for input(s): \"BNP\" in the last 72 hours.  PT/INR: No results for input(s): \"PROTIME\", \"INR\" in the last 72 hours.  APTT:No results for input(s): \"APTT\" in the last 72 hours.  CARDIAC ENZYMES:No results for input(s): \"CKTOTAL\", \"CKMB\", \"CKMBINDEX\", \"TROPONINI\" in the last 72 hours.  FASTING LIPID PANEL:  Lab Results   Component Value Date/Time    HDL 40 06/10/2024 09:53 AM    LDLDIRECT 75 05/29/2018 09:50 AM    TRIG 100 06/10/2024 09:53 AM     LIVER PROFILE:No results for input(s): \"AST\", \"ALT\", \"LABALBU\" in the last 72 hours.        Past Medical

## 2024-06-13 NOTE — ASSESSMENT & PLAN NOTE
He is stable right now followed by neurology he is on Namenda was intolerant of Aricept progression is too much for the IV infusions

## 2024-06-13 NOTE — PATIENT INSTRUCTIONS
Take Bactrim antibiotic 1 tablet twice a day for 7 days and return to the office in one week. If for some reason it starts to appear infected do not wait until this appointment, contact the office and see a NP before Dr. Reynoso returns.

## 2024-06-13 NOTE — TELEPHONE ENCOUNTER
Luis A called requesting a refill of the below medication which has been pended for you:     Requested Prescriptions     Pending Prescriptions Disp Refills    clopidogrel (PLAVIX) 75 MG tablet [Pharmacy Med Name: CLOPIDOGREL 75MG TABLETS] 90 tablet 1     Sig: TAKE 1 TABLET BY MOUTH DAILY       Last Appointment Date: 5/8/2024  Next Appointment Date: 6/13/2024    No Known Allergies

## 2024-06-13 NOTE — PROGRESS NOTES
MRI, Loop recorder   @assessment requiring independent historian wife Sigrid @   Subjective:      Review of Systems   Constitutional:  Negative for fatigue, fever and unexpected weight change.   HENT:  Negative for ear discharge, ear pain, mouth sores, sore throat and trouble swallowing.    Eyes:  Negative for discharge, itching and visual disturbance.   Respiratory:  Negative for cough, choking, shortness of breath, wheezing and stridor.    Cardiovascular:  Negative for chest pain, palpitations and leg swelling.   Gastrointestinal:  Negative for abdominal distention, abdominal pain, blood in stool, constipation, diarrhea, nausea and vomiting.   Endocrine: Negative for cold intolerance, polydipsia and polyuria.   Genitourinary:  Negative for difficulty urinating, dysuria, frequency and urgency.   Musculoskeletal:  Positive for arthralgias and back pain. Negative for gait problem.   Skin:  Negative for color change and rash.   Allergic/Immunologic: Negative for food allergies and immunocompromised state.   Neurological:  Negative for dizziness, tremors, syncope, speech difficulty, weakness and headaches.        Peripheral neuropathy   Hematological:  Negative for adenopathy. Does not bruise/bleed easily.   Psychiatric/Behavioral:  Negative for confusion and hallucinations.         Progressing dementia       Objective:     Physical Exam  Constitutional:       General: He is not in acute distress.     Appearance: He is well-developed.   HENT:      Head: Normocephalic and atraumatic.   Eyes:      General: No scleral icterus.        Right eye: No discharge.         Left eye: No discharge.      Pupils: Pupils are equal, round, and reactive to light.   Neck:      Thyroid: No thyromegaly.      Vascular: No JVD.   Cardiovascular:      Rate and Rhythm: Normal rate and regular rhythm.      Heart sounds: Normal heart sounds. No murmur heard.  Pulmonary:      Effort: Pulmonary effort is normal. No respiratory distress.

## 2024-06-13 NOTE — PATIENT INSTRUCTIONS
1.  Dementia requiring more assistance is on Namenda 10 twice a day and is followed by neurology  2.  Back pain followed by Dr. Stephens he is on hydrocodone  3.  Loop recorder in place so far we have found no atrial fibs  4.  Chronic bronchitis stable with meds and inhalers  5.  Depression stable with Cymbalta  6.  Hypertension stable with lisinopril metoprolol at very low doses  7.  Hyperlipidemia stable with Mevacor  8.  Peripheral neuropathy stable with gabapentin  9.  Question of enlarging aorta in 20 last 1/20/2022 shows no aneurysm and no changes we could probably do 1 this year and if there is no changes just stop doing them;  10  anemia on labs today ;  we have added other labs and will call you later with those results;     if normal we will repeat the CBC abd get  b12 lab next week,  if iron is low we will start meds;   PATIENT INSTRUCTIONS:      IT IS VERY IMPORTANT THAT YOU GET YOUR LABS AT LEAST 1 DAY BEFORE YOUR APPT.  WE MAY HAVE TO RESCHEDULE YOU IF YOU WE DONT HAVE THE RESULTS;      WE CAN DISCUSS THE LABS ON THE DAY OF YOUR OFFICE VISIT AND MAKE CHANGES TOGETHER WHILE YOU ARE HERE.  THEN YOU CAN LEAVE WITH A COPY OF THOSE CHANGES.     CALLING YOU BACK WITH ROUTINE RESULTS IS VERY TIME CONSUMING;  I HAVE TO REVIEW THE LABS, THEN YOUR PAST LABS AND OV, THEN MAKE A NOTE TO THE NURSES TO CALL YOU, OR I CALL MYSELF WITH EXTREME ABNORMALITIES WHICH CAN BE TIME CONSUMING AS WELL;  WHEN I HAVE TO TAKE TIME OUT OF ANOTHER DAY FOR YOUR RESULTS THAT IS TIME AWAY FROM OTHER PATIENTS AND DISCUSSING RESULTS OVER THE PHONE IS JUST NOT A GOOD IDEA;  WHILE HERE WE CAN WRITE DOWN THE CHANGES BEFORE YOU LEAVE THE OFFICE;      Life simple 7  1) Manage blood pressure - high blood pressure is a major risk factor for heart disease and stroke. Keeping blood pressure in health range reduces strain on your heart, arteries and kidneys.  Blood pressure goal is less than 130/80.   2) Control cholesterol - contributes to plaque,

## 2024-06-14 DIAGNOSIS — E78.00 PURE HYPERCHOLESTEROLEMIA: Primary | ICD-10-CM

## 2024-06-14 DIAGNOSIS — I10 HTN (HYPERTENSION), BENIGN: ICD-10-CM

## 2024-06-16 LAB
BACTERIA SPEC ANAEROBE CULT: ABNORMAL
BACTERIA SPEC ANAEROBE+AEROBE CULT: ABNORMAL
BACTERIA SPEC ANAEROBE+AEROBE CULT: ABNORMAL
GRAM STN SPEC: ABNORMAL
ORGANISM: ABNORMAL
ORGANISM: ABNORMAL

## 2024-06-18 DIAGNOSIS — R41.3 MEMORY LOSS: ICD-10-CM

## 2024-06-18 RX ORDER — MEMANTINE HYDROCHLORIDE 10 MG/1
10 TABLET ORAL 2 TIMES DAILY
Qty: 60 TABLET | Refills: 3 | Status: SHIPPED | OUTPATIENT
Start: 2024-06-18

## 2024-06-18 NOTE — TELEPHONE ENCOUNTER
Requested Prescriptions     Pending Prescriptions Disp Refills    memantine (NAMENDA) 10 MG tablet [Pharmacy Med Name: MEMANTINE 10MG TABLETS] 60 tablet 3     Sig: TAKE 1 TABLET BY MOUTH TWICE DAILY       Last Office Visit: 5/16/2024  Next Office Visit: 9/17/2024  Last Medication Refill: 2/16/24 with 3 refills

## 2024-06-19 DIAGNOSIS — I10 HTN (HYPERTENSION), BENIGN: ICD-10-CM

## 2024-06-19 DIAGNOSIS — E78.00 PURE HYPERCHOLESTEROLEMIA: ICD-10-CM

## 2024-06-19 DIAGNOSIS — D64.9 ANEMIA, UNSPECIFIED TYPE: ICD-10-CM

## 2024-06-19 LAB
BASOPHILS # BLD: 0 K/UL (ref 0–0.2)
BASOPHILS NFR BLD: 0.8 % (ref 0–1)
EOSINOPHIL # BLD: 0.1 K/UL (ref 0–0.6)
EOSINOPHIL NFR BLD: 0.9 % (ref 0–5)
ERYTHROCYTE [DISTWIDTH] IN BLOOD BY AUTOMATED COUNT: 13.4 % (ref 11.5–14.5)
HCT VFR BLD AUTO: 42.3 % (ref 42–52)
HGB BLD-MCNC: 13.9 G/DL (ref 14–18)
IMM GRANULOCYTES # BLD: 0 K/UL
LYMPHOCYTES # BLD: 1.7 K/UL (ref 1.1–4.5)
LYMPHOCYTES NFR BLD: 31.8 % (ref 20–40)
MCH RBC QN AUTO: 31.7 PG (ref 27–31)
MCHC RBC AUTO-ENTMCNC: 32.9 G/DL (ref 33–37)
MCV RBC AUTO: 96.4 FL (ref 80–94)
MONOCYTES # BLD: 0.5 K/UL (ref 0–0.9)
MONOCYTES NFR BLD: 9.3 % (ref 0–10)
NEUTROPHILS # BLD: 3 K/UL (ref 1.5–7.5)
NEUTS SEG NFR BLD: 57 % (ref 50–65)
PLATELET # BLD AUTO: 204 K/UL (ref 130–400)
PMV BLD AUTO: 10.3 FL (ref 9.4–12.4)
RBC # BLD AUTO: 4.39 M/UL (ref 4.7–6.1)
VIT B12 SERPL-MCNC: 300 PG/ML (ref 211–946)
WBC # BLD AUTO: 5.3 K/UL (ref 4.8–10.8)

## 2024-06-24 ENCOUNTER — OFFICE VISIT (OUTPATIENT)
Dept: CARDIOLOGY CLINIC | Age: 79
End: 2024-06-24
Payer: MEDICARE

## 2024-06-24 VITALS
SYSTOLIC BLOOD PRESSURE: 138 MMHG | BODY MASS INDEX: 27.22 KG/M2 | DIASTOLIC BLOOD PRESSURE: 70 MMHG | HEART RATE: 96 BPM | WEIGHT: 201 LBS | HEIGHT: 72 IN

## 2024-06-24 DIAGNOSIS — Z95.5 H/O HEART ARTERY STENT: ICD-10-CM

## 2024-06-24 DIAGNOSIS — I25.10 CORONARY ARTERY DISEASE INVOLVING NATIVE CORONARY ARTERY OF NATIVE HEART WITHOUT ANGINA PECTORIS: Primary | ICD-10-CM

## 2024-06-24 DIAGNOSIS — Z98.890 HISTORY OF LOOP RECORDER: ICD-10-CM

## 2024-06-24 DIAGNOSIS — E78.00 PURE HYPERCHOLESTEROLEMIA: ICD-10-CM

## 2024-06-24 DIAGNOSIS — I10 HTN (HYPERTENSION), BENIGN: ICD-10-CM

## 2024-06-24 DIAGNOSIS — Z72.0 TOBACCO ABUSE: ICD-10-CM

## 2024-06-24 PROCEDURE — 1123F ACP DISCUSS/DSCN MKR DOCD: CPT | Performed by: INTERNAL MEDICINE

## 2024-06-24 PROCEDURE — 4004F PT TOBACCO SCREEN RCVD TLK: CPT | Performed by: INTERNAL MEDICINE

## 2024-06-24 PROCEDURE — 3075F SYST BP GE 130 - 139MM HG: CPT | Performed by: INTERNAL MEDICINE

## 2024-06-24 PROCEDURE — G8417 CALC BMI ABV UP PARAM F/U: HCPCS | Performed by: INTERNAL MEDICINE

## 2024-06-24 PROCEDURE — G8427 DOCREV CUR MEDS BY ELIG CLIN: HCPCS | Performed by: INTERNAL MEDICINE

## 2024-06-24 PROCEDURE — 99213 OFFICE O/P EST LOW 20 MIN: CPT | Performed by: INTERNAL MEDICINE

## 2024-06-24 PROCEDURE — 3078F DIAST BP <80 MM HG: CPT | Performed by: INTERNAL MEDICINE

## 2024-06-24 ASSESSMENT — ENCOUNTER SYMPTOMS
RESPIRATORY NEGATIVE: 1
EYES NEGATIVE: 1
GASTROINTESTINAL NEGATIVE: 1
SHORTNESS OF BREATH: 0
VOMITING: 0
DIARRHEA: 0
NAUSEA: 0

## 2024-06-24 NOTE — PROGRESS NOTES
[facility] Progress Note                            Date:  6/24/2024  Patient: Luis A Keene  Age:  79 y.o., 1945      Reason for evaluation:         SUBJECTIVE:    Returns today follow-up assessment coronary artery disease hypertension previous loop recorder which just came out recently tobacco abuse ongoing hyperlipidemia and previous stent also previous stroke.  Loop recorder readings prior to the loop recorder coming out show tachycardia intermittently but no atrial fibrillation identified.  He is generally feeling well denies anginal chest pain dyspnea palpitations or other complaints.  The loop recorder site has healed completely.  Looking back through the transesophageal echo from 5/23 he had evidence of a patent foramen ovale.  They will be visiting with her neurology consultation team in the near future and I would be interested to know if they think that the mechanism of the stroke previously in any way could be related to the PFO and whether or not we need to refer him for closure.    Review of Systems   Constitutional: Negative.  Negative for chills, fever and unexpected weight change.   HENT: Negative.     Eyes: Negative.    Respiratory: Negative.  Negative for shortness of breath.    Cardiovascular: Negative.  Negative for chest pain.   Gastrointestinal: Negative.  Negative for diarrhea, nausea and vomiting.   Endocrine: Negative.    Genitourinary: Negative.    Musculoskeletal: Negative.    Skin: Negative.    Neurological: Negative.    All other systems reviewed and are negative.        OBJECTIVE:    /70   Pulse 96   Ht 1.829 m (6')   Wt 91.2 kg (201 lb)   BMI 27.26 kg/m²     Labs:   CBC: No results for input(s): \"WBC\", \"HGB\", \"HCT\", \"PLT\" in the last 72 hours.  BMP:No results for input(s): \"NA\", \"K\", \"CO2\", \"BUN\", \"CREATININE\", \"LABGLOM\", \"GLUCOSE\" in the last 72 hours.  BNP: No results for input(s): \"BNP\" in the last 72 hours.  PT/INR: No results for input(s):

## 2024-07-24 RX ORDER — FLUTICASONE PROPIONATE 50 MCG
2 SPRAY, SUSPENSION (ML) NASAL DAILY
Qty: 16 G | Refills: 5 | Status: SHIPPED | OUTPATIENT
Start: 2024-07-24

## 2024-07-24 NOTE — TELEPHONE ENCOUNTER
Luis A called requesting a refill of the below medication which has been pended for you:     Requested Prescriptions     Pending Prescriptions Disp Refills    fluticasone (FLONASE) 50 MCG/ACT nasal spray 16 g 5     Si sprays by Each Nostril route daily Shake liquid       Last Appointment Date: Visit date not found  Next Appointment Date: 2024    No Known Allergies

## 2024-08-14 ENCOUNTER — OFFICE VISIT (OUTPATIENT)
Dept: ENT CLINIC | Age: 79
End: 2024-08-14
Payer: MEDICARE

## 2024-08-14 VITALS
HEIGHT: 72 IN | DIASTOLIC BLOOD PRESSURE: 74 MMHG | SYSTOLIC BLOOD PRESSURE: 124 MMHG | BODY MASS INDEX: 28.42 KG/M2 | WEIGHT: 209.8 LBS | HEART RATE: 72 BPM

## 2024-08-14 DIAGNOSIS — H61.23 BILATERAL IMPACTED CERUMEN: Primary | ICD-10-CM

## 2024-08-14 PROCEDURE — 3074F SYST BP LT 130 MM HG: CPT | Performed by: PHYSICIAN ASSISTANT

## 2024-08-14 PROCEDURE — 4004F PT TOBACCO SCREEN RCVD TLK: CPT | Performed by: PHYSICIAN ASSISTANT

## 2024-08-14 PROCEDURE — 1123F ACP DISCUSS/DSCN MKR DOCD: CPT | Performed by: PHYSICIAN ASSISTANT

## 2024-08-14 PROCEDURE — 99212 OFFICE O/P EST SF 10 MIN: CPT | Performed by: PHYSICIAN ASSISTANT

## 2024-08-14 PROCEDURE — G8417 CALC BMI ABV UP PARAM F/U: HCPCS | Performed by: PHYSICIAN ASSISTANT

## 2024-08-14 PROCEDURE — 69210 REMOVE IMPACTED EAR WAX UNI: CPT | Performed by: PHYSICIAN ASSISTANT

## 2024-08-14 PROCEDURE — G8427 DOCREV CUR MEDS BY ELIG CLIN: HCPCS | Performed by: PHYSICIAN ASSISTANT

## 2024-08-14 PROCEDURE — 3078F DIAST BP <80 MM HG: CPT | Performed by: PHYSICIAN ASSISTANT

## 2024-08-14 NOTE — PROGRESS NOTES
Mr. Keene is a pleasant 79-year-old  male that presents for a 3-month cerumen check.  Patient has a history of having a prior mastoidectomy to the left side requiring frequent debridements due to cerumen impactions.  Patient also has a history of progressive dementia.      Physical examination with the microscope revealed the patient to have cerumen impactions bilaterally.  This was removed success with alligator graspers and suction with no complications.  After disimpaction, the TMs appeared to be normal.  Neck exam demonstrated no lymphadenopathy or thyromegaly.      Impression: Successful cerumen disimpaction with microscopy and instrumentation-bilateral    Plan: Patient is to follow-up in 3 months for routine cerumen check.  His wife was reminded to call if they have any questions or concerns.      Electronically signed by ARIANA KNIGHT PA-C on 8/14/24 at 10:30 AM LINAT

## 2024-08-20 ENCOUNTER — OFFICE VISIT (OUTPATIENT)
Dept: CARDIOLOGY CLINIC | Age: 79
End: 2024-08-20
Payer: MEDICARE

## 2024-08-20 VITALS
HEART RATE: 53 BPM | WEIGHT: 210 LBS | BODY MASS INDEX: 28.48 KG/M2 | SYSTOLIC BLOOD PRESSURE: 140 MMHG | OXYGEN SATURATION: 99 % | DIASTOLIC BLOOD PRESSURE: 86 MMHG

## 2024-08-20 DIAGNOSIS — I25.10 CORONARY ARTERY DISEASE INVOLVING NATIVE CORONARY ARTERY OF NATIVE HEART WITHOUT ANGINA PECTORIS: Primary | ICD-10-CM

## 2024-08-20 PROCEDURE — 1123F ACP DISCUSS/DSCN MKR DOCD: CPT | Performed by: INTERNAL MEDICINE

## 2024-08-20 PROCEDURE — G8417 CALC BMI ABV UP PARAM F/U: HCPCS | Performed by: INTERNAL MEDICINE

## 2024-08-20 PROCEDURE — 4004F PT TOBACCO SCREEN RCVD TLK: CPT | Performed by: INTERNAL MEDICINE

## 2024-08-20 PROCEDURE — 3077F SYST BP >= 140 MM HG: CPT | Performed by: INTERNAL MEDICINE

## 2024-08-20 PROCEDURE — G8427 DOCREV CUR MEDS BY ELIG CLIN: HCPCS | Performed by: INTERNAL MEDICINE

## 2024-08-20 PROCEDURE — 99214 OFFICE O/P EST MOD 30 MIN: CPT | Performed by: INTERNAL MEDICINE

## 2024-08-20 PROCEDURE — 3079F DIAST BP 80-89 MM HG: CPT | Performed by: INTERNAL MEDICINE

## 2024-08-20 ASSESSMENT — ENCOUNTER SYMPTOMS
ABDOMINAL PAIN: 0
DIARRHEA: 0
CHEST TIGHTNESS: 0
WHEEZING: 0
FACIAL SWELLING: 0
SHORTNESS OF BREATH: 0
ABDOMINAL DISTENTION: 0
VOMITING: 0
COUGH: 0
SORE THROAT: 0
BLOOD IN STOOL: 0
NAUSEA: 0
CONSTIPATION: 0
EYE REDNESS: 0
APNEA: 0
EYE DISCHARGE: 0
EYE PAIN: 0

## 2024-08-20 NOTE — PROGRESS NOTES
Cardiology Office Visit Note  [address] [address]  Phone: [phone]  Fax: [phone]                            Date:  8/20/2024  Patient: Luis A Keene  Age:  79 y.o., 1945    Referral: No ref. provider found    REASON FOR VISIT:  Follow-up (No cardiac concerns)         PROBLEM LIST:    Patient Active Problem List    Diagnosis Date Noted    Cryptogenic stroke (HCC) 03/06/2023     Priority: Medium    Memory loss 02/14/2023     Priority: Medium    Leg cramps 12/13/2022     Priority: Medium    Flat hearing loss of left ear 07/05/2022     Priority: Medium    Moderate dementia without behavioral disturbance, psychotic disturbance, mood disturbance, or anxiety ([facility]) 06/13/2024    Cerebrovascular accident (CVA) due to stenosis of precerebral artery ([facility]) 12/13/2023    Recurrent major depressive disorder, in partial remission ([facility]) 12/13/2023    Gastroesophageal reflux disease without esophagitis 07/01/2021    Benign prostatic hyperplasia with urinary frequency 07/01/2021    Memory impairment 07/02/2020    Vitamin D deficiency 07/02/2020    Tobacco abuse 01/06/2020    Abdominal aortic aneurysm (AAA) without rupture ([facility]) 01/06/2020     Overview Note:     2.1 from ultrasound July 2019.  We will repeat July 2020.  We will refer if over 5      Primary osteoarthritis involving multiple joints 11/19/2019    HTN (hypertension), benign 01/10/2019     Overview Note:     cont BP medication the am of procedure      Chronic pain syndrome 06/06/2018    Neuropathy 06/06/2018    Cognitive complaints 02/05/2018    COPD (chronic obstructive pulmonary disease) ([facility])     Coronary artery disease involving native coronary artery of native heart without angina pectoris      Overview Note:     LAD stent 3/2005 [facility]      Pure hypercholesterolemia     Mixed conductive and sensorineural hearing loss of right ear with restricted hearing of left ear 11/06/2017     Overview Note:     Left

## 2024-09-16 ENCOUNTER — TELEPHONE (OUTPATIENT)
Dept: INTERNAL MEDICINE | Age: 79
End: 2024-09-16

## 2024-09-17 ENCOUNTER — OFFICE VISIT (OUTPATIENT)
Dept: NEUROLOGY | Age: 79
End: 2024-09-17
Payer: MEDICARE

## 2024-09-17 VITALS
BODY MASS INDEX: 28.44 KG/M2 | HEIGHT: 72 IN | WEIGHT: 210 LBS | SYSTOLIC BLOOD PRESSURE: 136 MMHG | OXYGEN SATURATION: 97 % | HEART RATE: 66 BPM | DIASTOLIC BLOOD PRESSURE: 78 MMHG

## 2024-09-17 DIAGNOSIS — I67.9 CEREBROVASCULAR SMALL VESSEL DISEASE: ICD-10-CM

## 2024-09-17 DIAGNOSIS — I63.81 THALAMIC STROKE (HCC): ICD-10-CM

## 2024-09-17 DIAGNOSIS — Z86.73 HISTORY OF CVA IN ADULTHOOD: ICD-10-CM

## 2024-09-17 DIAGNOSIS — F03.90 DEMENTIA, UNSPECIFIED DEMENTIA SEVERITY, UNSPECIFIED DEMENTIA TYPE, UNSPECIFIED WHETHER BEHAVIORAL, PSYCHOTIC, OR MOOD DISTURBANCE OR ANXIETY (HCC): Primary | ICD-10-CM

## 2024-09-17 DIAGNOSIS — R25.1 TREMOR: ICD-10-CM

## 2024-09-17 PROCEDURE — G8427 DOCREV CUR MEDS BY ELIG CLIN: HCPCS | Performed by: NURSE PRACTITIONER

## 2024-09-17 PROCEDURE — 1123F ACP DISCUSS/DSCN MKR DOCD: CPT | Performed by: NURSE PRACTITIONER

## 2024-09-17 PROCEDURE — G8417 CALC BMI ABV UP PARAM F/U: HCPCS | Performed by: NURSE PRACTITIONER

## 2024-09-17 PROCEDURE — 3078F DIAST BP <80 MM HG: CPT | Performed by: NURSE PRACTITIONER

## 2024-09-17 PROCEDURE — 99213 OFFICE O/P EST LOW 20 MIN: CPT | Performed by: NURSE PRACTITIONER

## 2024-09-17 PROCEDURE — 4004F PT TOBACCO SCREEN RCVD TLK: CPT | Performed by: NURSE PRACTITIONER

## 2024-09-17 PROCEDURE — 3075F SYST BP GE 130 - 139MM HG: CPT | Performed by: NURSE PRACTITIONER

## 2024-09-20 ENCOUNTER — HOSPITAL ENCOUNTER (OUTPATIENT)
Dept: ULTRASOUND IMAGING | Age: 79
Discharge: HOME | End: 2024-09-20

## 2024-09-20 DIAGNOSIS — Z13.6 SCREENING FOR AAA (ABDOMINAL AORTIC ANEURYSM): ICD-10-CM

## 2024-09-24 ENCOUNTER — HOSPITAL ENCOUNTER (OUTPATIENT)
Dept: ULTRASOUND IMAGING | Age: 79
Discharge: HOME | End: 2024-09-24
Payer: MEDICARE

## 2024-09-24 PROCEDURE — 76775 US EXAM ABDO BACK WALL LIM: CPT

## 2024-10-27 DIAGNOSIS — R41.3 MEMORY LOSS: ICD-10-CM

## 2024-10-28 RX ORDER — MEMANTINE HYDROCHLORIDE 10 MG/1
10 TABLET ORAL 2 TIMES DAILY
Qty: 60 TABLET | Refills: 3 | Status: SHIPPED | OUTPATIENT
Start: 2024-10-28

## 2024-10-28 RX ORDER — FLUTICASONE PROPIONATE 50 MCG
SPRAY, SUSPENSION (ML) NASAL
Qty: 16 G | Refills: 5 | Status: SHIPPED | OUTPATIENT
Start: 2024-10-28

## 2024-10-28 NOTE — TELEPHONE ENCOUNTER
Requested Prescriptions     Pending Prescriptions Disp Refills    memantine (NAMENDA) 10 MG tablet [Pharmacy Med Name: MEMANTINE 10MG TABLETS] 60 tablet 3     Sig: TAKE 1 TABLET BY MOUTH TWICE DAILY       Last Office Visit: 9/17/2024  Next Office Visit: 4/17/2025  Last Medication Refill: 6/18/2024 with 3 RF

## 2024-11-14 ENCOUNTER — OFFICE VISIT (OUTPATIENT)
Dept: ENT CLINIC | Age: 79
End: 2024-11-14

## 2024-11-14 VITALS
SYSTOLIC BLOOD PRESSURE: 120 MMHG | BODY MASS INDEX: 29.12 KG/M2 | WEIGHT: 215 LBS | DIASTOLIC BLOOD PRESSURE: 80 MMHG | HEIGHT: 72 IN

## 2024-11-14 DIAGNOSIS — H61.21 IMPACTED CERUMEN OF RIGHT EAR: Primary | ICD-10-CM

## 2024-11-14 NOTE — PROGRESS NOTES
Mr. Keene is a pleasant 79-year-old  male that presents for a 3-month cerumen check.  Patient has a history of mastoid surgery to the left side and requires debridement frequently.  Currently he is having no drainage from the ears or any difficulty using his hearing aid to the right ear.      Physical examination with the microscope revealed a cerumen impaction to the right ear that was successfully removed with suction with no complications.  After disimpaction the TM appeared to be normal.  Examination of the left ear demonstrated the mastoid cavity to be clean with no cerumen.  Neck exam demonstrated no lymphadenopathy or thyromegaly.      Impression: Successful cerumen disimpaction with microscopy and instrumentation-right ear      Plan: Due to the patient's dementia worsening, will have him follow-up in 6 months for cerumen check unless they noticed obvious hearing changes.  His wife was reminded to call if they have any questions or concerns.      Electronically signed by ARIANA KNIGHT PA-C on 11/14/24 at 10:18 AM CST

## 2024-12-12 NOTE — TELEPHONE ENCOUNTER
Luis A called requesting a refill of the below medication which has been pended for you:     Requested Prescriptions     Pending Prescriptions Disp Refills    clopidogrel (PLAVIX) 75 MG tablet 90 tablet 1     Sig: Take 1 tablet by mouth daily       Last Appointment Date: Visit date not found  Next Appointment Date: 12/17/2024    No Known Allergies

## 2024-12-13 DIAGNOSIS — E55.9 VITAMIN D DEFICIENCY: ICD-10-CM

## 2024-12-13 DIAGNOSIS — D64.9 ANEMIA, UNSPECIFIED TYPE: ICD-10-CM

## 2024-12-13 DIAGNOSIS — I10 HTN (HYPERTENSION), BENIGN: ICD-10-CM

## 2024-12-13 LAB
25(OH)D3 SERPL-MCNC: 62.7 NG/ML
ALBUMIN SERPL-MCNC: 4.4 G/DL (ref 3.5–5.2)
ALP SERPL-CCNC: 69 U/L (ref 40–129)
ALT SERPL-CCNC: 15 U/L (ref 5–41)
ANION GAP SERPL CALCULATED.3IONS-SCNC: 11 MMOL/L (ref 7–19)
AST SERPL-CCNC: 15 U/L (ref 5–40)
BACTERIA URNS QL MICRO: NEGATIVE /HPF
BASOPHILS # BLD: 0.1 K/UL (ref 0–0.2)
BASOPHILS NFR BLD: 0.9 % (ref 0–1)
BILIRUB SERPL-MCNC: 1.1 MG/DL (ref 0.2–1.2)
BILIRUB UR QL STRIP: NEGATIVE
BUN SERPL-MCNC: 18 MG/DL (ref 8–23)
CALCIUM SERPL-MCNC: 10 MG/DL (ref 8.8–10.2)
CHLORIDE SERPL-SCNC: 107 MMOL/L (ref 98–111)
CLARITY UR: CLEAR
CO2 SERPL-SCNC: 27 MMOL/L (ref 22–29)
COLOR UR: YELLOW
CREAT SERPL-MCNC: 1.1 MG/DL (ref 0.7–1.2)
CRYSTALS URNS MICRO: ABNORMAL /HPF
EOSINOPHIL # BLD: 0.1 K/UL (ref 0–0.6)
EOSINOPHIL NFR BLD: 1.3 % (ref 0–5)
EPI CELLS #/AREA URNS AUTO: 1 /HPF (ref 0–5)
ERYTHROCYTE [DISTWIDTH] IN BLOOD BY AUTOMATED COUNT: 12.7 % (ref 11.5–14.5)
GLUCOSE SERPL-MCNC: 95 MG/DL (ref 70–99)
GLUCOSE UR STRIP.AUTO-MCNC: NEGATIVE MG/DL
HCT VFR BLD AUTO: 46.6 % (ref 42–52)
HGB BLD-MCNC: 14.9 G/DL (ref 14–18)
HGB UR STRIP.AUTO-MCNC: ABNORMAL MG/L
HYALINE CASTS #/AREA URNS AUTO: 1 /HPF (ref 0–8)
IMM GRANULOCYTES # BLD: 0 K/UL
IRON SERPL-MCNC: 87 UG/DL (ref 59–158)
KETONES UR STRIP.AUTO-MCNC: NEGATIVE MG/DL
LEUKOCYTE ESTERASE UR QL STRIP.AUTO: ABNORMAL
LYMPHOCYTES # BLD: 2.4 K/UL (ref 1.1–4.5)
LYMPHOCYTES NFR BLD: 34.9 % (ref 20–40)
MCH RBC QN AUTO: 31.1 PG (ref 27–31)
MCHC RBC AUTO-ENTMCNC: 32 G/DL (ref 33–37)
MCV RBC AUTO: 97.3 FL (ref 80–94)
MONOCYTES # BLD: 0.5 K/UL (ref 0–0.9)
MONOCYTES NFR BLD: 7.5 % (ref 0–10)
NEUTROPHILS # BLD: 3.8 K/UL (ref 1.5–7.5)
NEUTS SEG NFR BLD: 55.1 % (ref 50–65)
NITRITE UR QL STRIP.AUTO: NEGATIVE
PH UR STRIP.AUTO: 5.5 [PH] (ref 5–8)
PLATELET # BLD AUTO: 208 K/UL (ref 130–400)
PMV BLD AUTO: 10.4 FL (ref 9.4–12.4)
POTASSIUM SERPL-SCNC: 4.7 MMOL/L (ref 3.5–5)
PROT SERPL-MCNC: 7 G/DL (ref 6.4–8.3)
PROT UR STRIP.AUTO-MCNC: ABNORMAL MG/DL
RBC # BLD AUTO: 4.79 M/UL (ref 4.7–6.1)
RBC #/AREA URNS AUTO: 3 /HPF (ref 0–4)
SODIUM SERPL-SCNC: 145 MMOL/L (ref 136–145)
SP GR UR STRIP.AUTO: 1.02 (ref 1–1.03)
UROBILINOGEN UR STRIP.AUTO-MCNC: 0.2 E.U./DL
WBC # BLD AUTO: 6.8 K/UL (ref 4.8–10.8)
WBC #/AREA URNS AUTO: 17 /HPF (ref 0–5)

## 2024-12-14 RX ORDER — CLOPIDOGREL BISULFATE 75 MG/1
75 TABLET ORAL DAILY
Qty: 90 TABLET | Refills: 0 | Status: SHIPPED | OUTPATIENT
Start: 2024-12-14

## 2024-12-15 LAB — BACTERIA UR CULT: NORMAL

## 2024-12-17 ENCOUNTER — OFFICE VISIT (OUTPATIENT)
Dept: INTERNAL MEDICINE | Age: 79
End: 2024-12-17
Payer: MEDICARE

## 2024-12-17 VITALS
BODY MASS INDEX: 28.99 KG/M2 | HEIGHT: 72 IN | SYSTOLIC BLOOD PRESSURE: 122 MMHG | DIASTOLIC BLOOD PRESSURE: 70 MMHG | HEART RATE: 74 BPM | OXYGEN SATURATION: 98 % | WEIGHT: 214 LBS

## 2024-12-17 DIAGNOSIS — E55.9 VITAMIN D DEFICIENCY: ICD-10-CM

## 2024-12-17 DIAGNOSIS — J44.9 CHRONIC OBSTRUCTIVE PULMONARY DISEASE, UNSPECIFIED COPD TYPE (HCC): ICD-10-CM

## 2024-12-17 DIAGNOSIS — I10 HTN (HYPERTENSION), BENIGN: ICD-10-CM

## 2024-12-17 DIAGNOSIS — Z23 INFLUENZA VACCINE NEEDED: ICD-10-CM

## 2024-12-17 DIAGNOSIS — F01.B0 MODERATE VASCULAR DEMENTIA WITHOUT BEHAVIORAL DISTURBANCE, PSYCHOTIC DISTURBANCE, MOOD DISTURBANCE, OR ANXIETY (HCC): Primary | ICD-10-CM

## 2024-12-17 DIAGNOSIS — Z87.891 PERSONAL HISTORY OF TOBACCO USE: ICD-10-CM

## 2024-12-17 DIAGNOSIS — E53.8 B12 DEFICIENCY: ICD-10-CM

## 2024-12-17 PROCEDURE — 3023F SPIROM DOC REV: CPT | Performed by: INTERNAL MEDICINE

## 2024-12-17 PROCEDURE — 1123F ACP DISCUSS/DSCN MKR DOCD: CPT | Performed by: INTERNAL MEDICINE

## 2024-12-17 PROCEDURE — 3074F SYST BP LT 130 MM HG: CPT | Performed by: INTERNAL MEDICINE

## 2024-12-17 PROCEDURE — G0296 VISIT TO DETERM LDCT ELIG: HCPCS | Performed by: INTERNAL MEDICINE

## 2024-12-17 PROCEDURE — 3078F DIAST BP <80 MM HG: CPT | Performed by: INTERNAL MEDICINE

## 2024-12-17 PROCEDURE — 96372 THER/PROPH/DIAG INJ SC/IM: CPT | Performed by: INTERNAL MEDICINE

## 2024-12-17 PROCEDURE — 90653 IIV ADJUVANT VACCINE IM: CPT | Performed by: INTERNAL MEDICINE

## 2024-12-17 PROCEDURE — G8417 CALC BMI ABV UP PARAM F/U: HCPCS | Performed by: INTERNAL MEDICINE

## 2024-12-17 PROCEDURE — G0008 ADMIN INFLUENZA VIRUS VAC: HCPCS | Performed by: INTERNAL MEDICINE

## 2024-12-17 PROCEDURE — 99214 OFFICE O/P EST MOD 30 MIN: CPT | Performed by: INTERNAL MEDICINE

## 2024-12-17 PROCEDURE — G8427 DOCREV CUR MEDS BY ELIG CLIN: HCPCS | Performed by: INTERNAL MEDICINE

## 2024-12-17 PROCEDURE — G8482 FLU IMMUNIZE ORDER/ADMIN: HCPCS | Performed by: INTERNAL MEDICINE

## 2024-12-17 PROCEDURE — 1159F MED LIST DOCD IN RCRD: CPT | Performed by: INTERNAL MEDICINE

## 2024-12-17 PROCEDURE — 4004F PT TOBACCO SCREEN RCVD TLK: CPT | Performed by: INTERNAL MEDICINE

## 2024-12-17 RX ORDER — LANOLIN ALCOHOL/MO/W.PET/CERES
1000 CREAM (GRAM) TOPICAL DAILY
Qty: 90 TABLET | Refills: 3 | Status: SHIPPED | OUTPATIENT
Start: 2024-12-17

## 2024-12-17 RX ORDER — FLUTICASONE PROPIONATE AND SALMETEROL 250; 50 UG/1; UG/1
POWDER RESPIRATORY (INHALATION)
Qty: 60 EACH | Refills: 5 | Status: SHIPPED | OUTPATIENT
Start: 2024-12-17 | End: 2024-12-27 | Stop reason: SDUPTHER

## 2024-12-17 RX ORDER — CYANOCOBALAMIN 1000 UG/ML
1000 INJECTION, SOLUTION INTRAMUSCULAR; SUBCUTANEOUS ONCE
Status: COMPLETED | OUTPATIENT
Start: 2024-12-17 | End: 2024-12-17

## 2024-12-17 RX ORDER — ERGOCALCIFEROL 1.25 MG/1
CAPSULE, LIQUID FILLED ORAL
Qty: 12 CAPSULE | Refills: 1
Start: 2024-12-17

## 2024-12-17 RX ADMIN — CYANOCOBALAMIN 1000 MCG: 1000 INJECTION, SOLUTION INTRAMUSCULAR; SUBCUTANEOUS at 10:30

## 2024-12-17 NOTE — PROGRESS NOTES
No Food Insecurity (5/3/2024)    Hunger Vital Sign     Worried About Running Out of Food in the Last Year: Never true     Ran Out of Food in the Last Year: Never true   Transportation Needs: Unknown (5/3/2024)    PRAPARE - Transportation     Lack of Transportation (Medical): Not on file     Lack of Transportation (Non-Medical): No   Physical Activity: Unknown (12/13/2023)    Exercise Vital Sign     Days of Exercise per Week: 0 days     Minutes of Exercise per Session: Not on file   Recent Concern: Physical Activity - Inactive (12/13/2023)    Exercise Vital Sign     Days of Exercise per Week: 0 days     Minutes of Exercise per Session: 60 min   Stress: Not on file   Social Connections: Unknown (10/11/2023)    Received from [facility], [facility]    Family and Community Support     Help with Day-to-Day Activities: Not on file     Lonely or Isolated: Not on file   Intimate Partner Violence: Unknown (10/11/2023)    Received from [facility], [facility]    Abuse Screen     Unsafe at Home or Work/School: Not on file     Feels Threatened by Someone?: Not on file     Does Anyone Keep You from Contacting Others or Doint Things Outside the Home?: Not on file     Physical Sign of Abuse Present: Not on file   Housing Stability: Unknown (5/3/2024)    Housing Stability Vital Sign     Unable to Pay for Housing in the Last Year: Not on file     Number of Places Lived in the Last Year: Not on file     Unstable Housing in the Last Year: No       No Known Allergies    Current Outpatient Medications   Medication Sig Dispense Refill    vitamin D (ERGOCALCIFEROL) 1.25 MG (26076 UT) CAPS capsule Take 1 capsule monthly 12 capsule 1    vitamin B-12 (CYANOCOBALAMIN) 1000 MCG tablet Take 1 tablet by mouth daily 90 tablet 3    nirmatrelvir/ritonavir 300/100 (PAXLOVID, 300/100,) 20 x 150 MG & 10 x 100MG TBPK Take 3 tablets (two 150 mg nirmatrelvir and one 100 mg ritonavir tablets) by

## 2024-12-27 DIAGNOSIS — J44.9 CHRONIC OBSTRUCTIVE PULMONARY DISEASE, UNSPECIFIED COPD TYPE (HCC): ICD-10-CM

## 2024-12-27 DIAGNOSIS — G89.4 CHRONIC PAIN SYNDROME: ICD-10-CM

## 2024-12-27 RX ORDER — GABAPENTIN 300 MG/1
CAPSULE ORAL
Qty: 120 CAPSULE | Refills: 1 | Status: SHIPPED | OUTPATIENT
Start: 2024-12-27 | End: 2025-07-18

## 2024-12-27 RX ORDER — FLUTICASONE PROPIONATE AND SALMETEROL 250; 50 UG/1; UG/1
POWDER RESPIRATORY (INHALATION)
Qty: 60 EACH | Refills: 5 | Status: SHIPPED | OUTPATIENT
Start: 2024-12-27

## 2024-12-27 NOTE — TELEPHONE ENCOUNTER
Patients wife called the office requesting a refill on the gabapentin. Patients wife stated that the pharmacy had requested a refill but had not received it. Please advise.

## 2024-12-31 ENCOUNTER — TELEPHONE (OUTPATIENT)
Dept: INTERNAL MEDICINE | Age: 79
End: 2024-12-31

## 2024-12-31 NOTE — TELEPHONE ENCOUNTER
I sent it in---- let us know if not better -- hold mevacor while on paxlovid and wait 12 hours post last dose of mevacor to start it -- change advair to once daily while on it --- let me know if not better

## 2024-12-31 NOTE — TELEPHONE ENCOUNTER
He tested positive for COVID yesterday.  Diarrhea, 100 temp, chills, cough, conggestion.  Requesting Paxlovid to [facility].

## 2025-01-27 ENCOUNTER — HOSPITAL ENCOUNTER (OUTPATIENT)
Dept: CT IMAGING | Age: 80
Discharge: HOME | End: 2025-01-27
Payer: MEDICARE

## 2025-01-27 DIAGNOSIS — Z87.891 PERSONAL HISTORY OF TOBACCO USE: ICD-10-CM

## 2025-01-27 PROCEDURE — 71271 CT THORAX LUNG CANCER SCR C-: CPT

## 2025-01-29 ENCOUNTER — TELEPHONE (OUTPATIENT)
Dept: INTERNAL MEDICINE | Age: 80
End: 2025-01-29

## 2025-01-29 DIAGNOSIS — J44.9 CHRONIC OBSTRUCTIVE PULMONARY DISEASE, UNSPECIFIED COPD TYPE (HCC): Primary | ICD-10-CM

## 2025-01-29 NOTE — TELEPHONE ENCOUNTER
Per Insurance, the Advair diskus will need to be changed to Breo Ellipta, Breyna, or Advair HFA.    Please send new medication to [facility]

## 2025-01-30 RX ORDER — FLUTICASONE PROPIONATE AND SALMETEROL XINAFOATE 45; 21 UG/1; UG/1
2 AEROSOL, METERED RESPIRATORY (INHALATION) 2 TIMES DAILY
Qty: 3 EACH | Refills: 3 | Status: SHIPPED | OUTPATIENT
Start: 2025-01-30

## 2025-02-24 DIAGNOSIS — E78.00 PURE HYPERCHOLESTEROLEMIA: ICD-10-CM

## 2025-02-24 RX ORDER — LOVASTATIN 20 MG/1
20 TABLET ORAL NIGHTLY
Qty: 90 TABLET | Refills: 1 | Status: SHIPPED | OUTPATIENT
Start: 2025-02-24

## 2025-03-03 ENCOUNTER — OFFICE VISIT (OUTPATIENT)
Dept: CARDIOLOGY CLINIC | Age: 80
End: 2025-03-03
Payer: MEDICARE

## 2025-03-03 VITALS
SYSTOLIC BLOOD PRESSURE: 132 MMHG | BODY MASS INDEX: 28.1 KG/M2 | OXYGEN SATURATION: 99 % | DIASTOLIC BLOOD PRESSURE: 80 MMHG | WEIGHT: 212 LBS | HEART RATE: 73 BPM | HEIGHT: 73 IN

## 2025-03-03 DIAGNOSIS — I10 HTN (HYPERTENSION), BENIGN: Primary | ICD-10-CM

## 2025-03-03 DIAGNOSIS — I25.10 CORONARY ARTERY DISEASE INVOLVING NATIVE CORONARY ARTERY OF NATIVE HEART WITHOUT ANGINA PECTORIS: ICD-10-CM

## 2025-03-03 PROCEDURE — 99214 OFFICE O/P EST MOD 30 MIN: CPT | Performed by: INTERNAL MEDICINE

## 2025-03-03 PROCEDURE — G8427 DOCREV CUR MEDS BY ELIG CLIN: HCPCS | Performed by: INTERNAL MEDICINE

## 2025-03-03 PROCEDURE — 93000 ELECTROCARDIOGRAM COMPLETE: CPT | Performed by: INTERNAL MEDICINE

## 2025-03-03 PROCEDURE — 3079F DIAST BP 80-89 MM HG: CPT | Performed by: INTERNAL MEDICINE

## 2025-03-03 PROCEDURE — G8417 CALC BMI ABV UP PARAM F/U: HCPCS | Performed by: INTERNAL MEDICINE

## 2025-03-03 PROCEDURE — 4004F PT TOBACCO SCREEN RCVD TLK: CPT | Performed by: INTERNAL MEDICINE

## 2025-03-03 PROCEDURE — 1160F RVW MEDS BY RX/DR IN RCRD: CPT | Performed by: INTERNAL MEDICINE

## 2025-03-03 PROCEDURE — 1123F ACP DISCUSS/DSCN MKR DOCD: CPT | Performed by: INTERNAL MEDICINE

## 2025-03-03 PROCEDURE — 1159F MED LIST DOCD IN RCRD: CPT | Performed by: INTERNAL MEDICINE

## 2025-03-03 PROCEDURE — 3075F SYST BP GE 130 - 139MM HG: CPT | Performed by: INTERNAL MEDICINE

## 2025-03-03 RX ORDER — NITROGLYCERIN 0.4 MG/1
0.4 TABLET SUBLINGUAL EVERY 5 MIN PRN
Qty: 25 TABLET | Refills: 1 | Status: SHIPPED | OUTPATIENT
Start: 2025-03-03

## 2025-03-03 RX ORDER — METOPROLOL SUCCINATE 25 MG/1
25 TABLET, EXTENDED RELEASE ORAL DAILY
Qty: 30 TABLET | Refills: 5 | Status: SHIPPED | OUTPATIENT
Start: 2025-03-03 | End: 2025-07-01

## 2025-03-03 ASSESSMENT — ENCOUNTER SYMPTOMS
ABDOMINAL PAIN: 0
CONSTIPATION: 0
COUGH: 0
APNEA: 0
SORE THROAT: 0
ABDOMINAL DISTENTION: 0
EYE DISCHARGE: 0
DIARRHEA: 0
WHEEZING: 0
FACIAL SWELLING: 0
EYE PAIN: 0
CHEST TIGHTNESS: 0
SHORTNESS OF BREATH: 0
NAUSEA: 0
EYE REDNESS: 0
VOMITING: 0
BLOOD IN STOOL: 0

## 2025-03-03 NOTE — PROGRESS NOTES
evidence for congestive heart failure  Nuclear medicine stress test 6/2023: EF 41%, large/fixed apical, apical inferior lateral wall and apical anterior septal wall defects consistent with prior infarct with associated apical akinesis. No evidence for inducible ischemia.  Optimize anti-ischemic medication regimen. Continue dual antiplatelets. Nitroglycerin as needed. Tolerating metoprolol. Continue statin therapy.     Chronic essential hypertension  Continue lisinopril  Home blood pressure monitoring, goal blood pressure less than 130/80  Low sodium diet less than 2 g per 24 hours     Chronic dyslipidemia  Goal LDL less than 55  Continue Mevacor  LFT and lipid monitoring as per PCP     Peripheral vascular disease  Mild bilateral internal carotid carotid artery disease  Abdominal aortic aneurysm screening  IMPRESSION:  Mild ectasia of the proximal abdominal aorta without  evidence of an abdominal aortic aneurysm. The aorta has a maximum  diameter of 3 cm. Signed by Dr Modesto Mancuso  Continue conservative medical management including antiplatelet and statin therapy                        No follow-ups on file.      Electronically signed by Josse Ortiz MD on 3/3/2025 at 10:16 AM    Josse Ortiz MD, LATANYA, [facility]  Noninvasive Cardiology Consultant    This dictation was generated by voice recognition computer software.  Although all attempts are made to edit the dictation for accuracy, there may be errors in the transcription that are not intended.

## 2025-03-05 DIAGNOSIS — G89.4 CHRONIC PAIN SYNDROME: ICD-10-CM

## 2025-03-07 RX ORDER — GABAPENTIN 300 MG/1
CAPSULE ORAL
Qty: 120 CAPSULE | Refills: 1 | Status: SHIPPED | OUTPATIENT
Start: 2025-03-07 | End: 2025-05-07

## 2025-03-19 DIAGNOSIS — R41.3 MEMORY LOSS: ICD-10-CM

## 2025-03-19 RX ORDER — MEMANTINE HYDROCHLORIDE 10 MG/1
10 TABLET ORAL 2 TIMES DAILY
Qty: 180 TABLET | Refills: 3 | Status: SHIPPED | OUTPATIENT
Start: 2025-03-19

## 2025-03-19 NOTE — TELEPHONE ENCOUNTER
Requested Prescriptions     Pending Prescriptions Disp Refills    memantine (NAMENDA) 10 MG tablet [Pharmacy Med Name: MEMANTINE 10MG TABLETS] 180 tablet 3     Sig: TAKE 1 TABLET BY MOUTH TWICE DAILY       Last Office Visit: 9/17/2024  Next Office Visit: 4/17/2025  Last Medication Refill:2/20/2025

## 2025-04-01 RX ORDER — LISINOPRIL 2.5 MG/1
2.5 TABLET ORAL DAILY
Qty: 90 TABLET | Refills: 1 | Status: SHIPPED | OUTPATIENT
Start: 2025-04-01

## 2025-04-09 ENCOUNTER — OFFICE VISIT (OUTPATIENT)
Dept: ENT CLINIC | Age: 80
End: 2025-04-09
Payer: MEDICARE

## 2025-04-09 VITALS — DIASTOLIC BLOOD PRESSURE: 74 MMHG | SYSTOLIC BLOOD PRESSURE: 112 MMHG | BODY MASS INDEX: 28.37 KG/M2 | WEIGHT: 215 LBS

## 2025-04-09 DIAGNOSIS — H65.91 RIGHT NON-SUPPURATIVE OTITIS MEDIA: Primary | ICD-10-CM

## 2025-04-09 PROCEDURE — 3078F DIAST BP <80 MM HG: CPT | Performed by: PHYSICIAN ASSISTANT

## 2025-04-09 PROCEDURE — 3074F SYST BP LT 130 MM HG: CPT | Performed by: PHYSICIAN ASSISTANT

## 2025-04-09 PROCEDURE — G8427 DOCREV CUR MEDS BY ELIG CLIN: HCPCS | Performed by: PHYSICIAN ASSISTANT

## 2025-04-09 PROCEDURE — 1160F RVW MEDS BY RX/DR IN RCRD: CPT | Performed by: PHYSICIAN ASSISTANT

## 2025-04-09 PROCEDURE — 1123F ACP DISCUSS/DSCN MKR DOCD: CPT | Performed by: PHYSICIAN ASSISTANT

## 2025-04-09 PROCEDURE — 1159F MED LIST DOCD IN RCRD: CPT | Performed by: PHYSICIAN ASSISTANT

## 2025-04-09 PROCEDURE — 99213 OFFICE O/P EST LOW 20 MIN: CPT | Performed by: PHYSICIAN ASSISTANT

## 2025-04-09 PROCEDURE — 4004F PT TOBACCO SCREEN RCVD TLK: CPT | Performed by: PHYSICIAN ASSISTANT

## 2025-04-09 PROCEDURE — G8417 CALC BMI ABV UP PARAM F/U: HCPCS | Performed by: PHYSICIAN ASSISTANT

## 2025-04-09 RX ORDER — CLINDAMYCIN HYDROCHLORIDE 300 MG/1
300 CAPSULE ORAL 3 TIMES DAILY
Qty: 30 CAPSULE | Refills: 0 | Status: SHIPPED | OUTPATIENT
Start: 2025-04-09 | End: 2025-04-19

## 2025-04-09 RX ORDER — OFLOXACIN 3 MG/ML
4 SOLUTION/ DROPS OPHTHALMIC 3 TIMES DAILY
Qty: 10 ML | Refills: 0 | Status: SHIPPED | OUTPATIENT
Start: 2025-04-09 | End: 2025-04-19

## 2025-04-09 NOTE — PROGRESS NOTES
Mr. Keene is a pleasant 80-year-old  male that presents to the clinic with complaints of progressive hearing loss.  He reports that this seems to be worse to the right ear.  Denies any drainage from the ears.  Denies any issues with fever or chills.      Physical examination with the microscope revealed the patient to have a small amount of cerumen to the canal that was removed with suction.  He was noted with purulent drainage on the surface of the TM with a erythematous TM.  There was evidence of a microperforation centrally with purulent drainage noted.  Again the drainage was suctioned on the surface of the TM.  Examination of the left ear demonstrated a mildly retracted TM that was normal-appearing with scar tissue present.  Neck exam demonstrated no lymphadenopathy or thyromegaly.      Impression: Acute right otitis media with microperforation centrally    Plan: I will place the patient on ofloxacin eardrops and a 10-day course of clindamycin.  He is to follow-up in 2 weeks for reevaluation.  Patient was advised that if the hearing does not normalize after the infection has resolved, he will need a repeat hearing test.      Electronically signed by ARIANA KNIGHT PA-C on 4/9/25 at 3:15 PM CDT

## 2025-04-17 ENCOUNTER — OFFICE VISIT (OUTPATIENT)
Dept: NEUROLOGY | Age: 80
End: 2025-04-17
Payer: MEDICARE

## 2025-04-17 VITALS
WEIGHT: 215 LBS | HEART RATE: 63 BPM | DIASTOLIC BLOOD PRESSURE: 81 MMHG | BODY MASS INDEX: 28.49 KG/M2 | OXYGEN SATURATION: 97 % | HEIGHT: 73 IN | SYSTOLIC BLOOD PRESSURE: 135 MMHG

## 2025-04-17 DIAGNOSIS — Z86.73 HISTORY OF CVA IN ADULTHOOD: ICD-10-CM

## 2025-04-17 DIAGNOSIS — R25.1 TREMOR: ICD-10-CM

## 2025-04-17 DIAGNOSIS — F03.90 DEMENTIA, UNSPECIFIED DEMENTIA SEVERITY, UNSPECIFIED DEMENTIA TYPE, UNSPECIFIED WHETHER BEHAVIORAL, PSYCHOTIC, OR MOOD DISTURBANCE OR ANXIETY (HCC): Primary | ICD-10-CM

## 2025-04-17 DIAGNOSIS — I67.9 CEREBROVASCULAR SMALL VESSEL DISEASE: ICD-10-CM

## 2025-04-17 PROCEDURE — 3075F SYST BP GE 130 - 139MM HG: CPT | Performed by: NURSE PRACTITIONER

## 2025-04-17 PROCEDURE — 1159F MED LIST DOCD IN RCRD: CPT | Performed by: NURSE PRACTITIONER

## 2025-04-17 PROCEDURE — 1123F ACP DISCUSS/DSCN MKR DOCD: CPT | Performed by: NURSE PRACTITIONER

## 2025-04-17 PROCEDURE — G8427 DOCREV CUR MEDS BY ELIG CLIN: HCPCS | Performed by: NURSE PRACTITIONER

## 2025-04-17 PROCEDURE — G8417 CALC BMI ABV UP PARAM F/U: HCPCS | Performed by: NURSE PRACTITIONER

## 2025-04-17 PROCEDURE — 3079F DIAST BP 80-89 MM HG: CPT | Performed by: NURSE PRACTITIONER

## 2025-04-17 PROCEDURE — 4004F PT TOBACCO SCREEN RCVD TLK: CPT | Performed by: NURSE PRACTITIONER

## 2025-04-17 PROCEDURE — 99214 OFFICE O/P EST MOD 30 MIN: CPT | Performed by: NURSE PRACTITIONER

## 2025-04-17 PROCEDURE — 1160F RVW MEDS BY RX/DR IN RCRD: CPT | Performed by: NURSE PRACTITIONER

## 2025-04-17 NOTE — PROGRESS NOTES
REVIEW OF SYSTEMS    Constitutional: []Fever []Sweats []Chills [] Recent Injury [x] Denies all unless marked  HEENT:[]Headache  [] Head Injury [] Hearing Loss  [] Sore Throat  [] Ear Ache [x] Denies all unless marked  Spine:  [] Neck pain  [] Back pain  [] Sciaticia  [x] Denies all unless marked  Cardiovascular:[]Heart Disease []Palpitations [] Chest Pain   [x] Denies all unless marked  Pulmonary: []Shortness of Breath []Cough   [x] Denies all unless marked  Psychiatric/Behavioral:[] Depression [] Anxiety [x] Denies all unless marked  Gastrointestinal: []Nausea  []Vomiting  []Abdominal Pain  []Constipation  []Diarrhea  [x] Denies all unless marked  Genitourinary:   [] Frequency  [] Urgency  [] Dysuria [] Incontinence  [x] Denies all unless marked  Extremities: []Pain  []Swelling  [x] Denies all unless marked  Musculoskeletal: [] Myalgias  [] Joint Pain  [] Arthritis [] Muscle Cramps [] Muscle Twitches  [x] Denies all unless marked  Sleep: []Insomnia[]Snoring []Restless Legs  []Sleep Apnea  []Daytime Sleepiness  [x] Denies all unless marked  Skin:[] Rash [] Color Change [x] Denies all unless marked   Neurological:[]Visual Disturbance [] Memory Loss []Loss of Balance []Slurred Speech []Weakness []Seizures  [] Dizziness [x] Denies all unless marked      
and long-term, progressive for years with worsening following CVA.  Wife assisting with reminders for ADLs.  Pain well-controlled now, following with pain management.  Is on gabapentin and Norco.  MRI from 2017 with old right thalamic infarct, most recent MRI brain notes old bilateral medial thalamic infarcts with mild chronic ischemic disease.  Ultrasound carotid arteries with less than 50% stenosis bilaterally.  Zio patch completed with largely underlying sinus rhythm but runs of V. tach and SVT with frequent ventricular ectopics.  Echocardiogram with abnormal bubble study indicating intra-atrial communications.  SHERRY completed. Loop recorder (explanted) without evidence for significant burden of A-fib/atrial flutter.  Has had chemical stress with no ischemia. RoPE score of 3 indicating very unlikely probability that PFO is cause of CVA.  Exam unchanged, postural tremor to left hand and cognitive deficit noted.  Felt he has underlying neurodegenerative disease with progressive memory loss. He is on Namenda 10 mg twice daily, tolerating this well.  Felt donepezil would cause bradycardia, heart rate 63 today.  Not a candidate for Leqembi.        ICD-10-CM    1. Dementia, unspecified dementia severity, unspecified dementia type, unspecified whether behavioral, psychotic, or mood disturbance or anxiety ([facility])  F03.90       2. History of CVA in adulthood  Z86.73       3. Cerebrovascular small vessel disease  I67.9       4. Tremor  R25.1               PLAN:  Continue Namenda 10 mg twice daily.  Unable to start Aricept given heart rate.  Not a candidate for Leqembi given degree of impairment.  Continue following with [facility] for pain control.   Continue following with cardiology as scheduled.  Loop recorder explanted.  If atrial fibrillation noted persistently will need to be on anticoagulation therapy.  Maximize stroke risk factors through primary care doctor including blood pressure, cholesterol, and glucose

## 2025-04-18 DIAGNOSIS — J44.9 CHRONIC OBSTRUCTIVE PULMONARY DISEASE, UNSPECIFIED COPD TYPE (HCC): ICD-10-CM

## 2025-04-18 RX ORDER — FLUTICASONE PROPIONATE AND SALMETEROL XINAFOATE 45; 21 UG/1; UG/1
2 AEROSOL, METERED RESPIRATORY (INHALATION) 2 TIMES DAILY
Qty: 3 EACH | Refills: 3 | Status: SHIPPED | OUTPATIENT
Start: 2025-04-18

## 2025-04-18 RX ORDER — FLUTICASONE PROPIONATE AND SALMETEROL XINAFOATE 45; 21 UG/1; UG/1
2 AEROSOL, METERED RESPIRATORY (INHALATION) 2 TIMES DAILY
Qty: 3 EACH | Refills: 3 | Status: SHIPPED | OUTPATIENT
Start: 2025-04-18 | End: 2025-04-18 | Stop reason: SDUPTHER

## 2025-04-23 ENCOUNTER — OFFICE VISIT (OUTPATIENT)
Dept: ENT CLINIC | Age: 80
End: 2025-04-23
Payer: MEDICARE

## 2025-04-23 VITALS
BODY MASS INDEX: 28.49 KG/M2 | WEIGHT: 215 LBS | HEIGHT: 73 IN | DIASTOLIC BLOOD PRESSURE: 94 MMHG | SYSTOLIC BLOOD PRESSURE: 132 MMHG

## 2025-04-23 DIAGNOSIS — H65.91 RIGHT NON-SUPPURATIVE OTITIS MEDIA: Primary | ICD-10-CM

## 2025-04-23 PROCEDURE — G8417 CALC BMI ABV UP PARAM F/U: HCPCS | Performed by: PHYSICIAN ASSISTANT

## 2025-04-23 PROCEDURE — 4004F PT TOBACCO SCREEN RCVD TLK: CPT | Performed by: PHYSICIAN ASSISTANT

## 2025-04-23 PROCEDURE — 99213 OFFICE O/P EST LOW 20 MIN: CPT | Performed by: PHYSICIAN ASSISTANT

## 2025-04-23 PROCEDURE — 1159F MED LIST DOCD IN RCRD: CPT | Performed by: PHYSICIAN ASSISTANT

## 2025-04-23 PROCEDURE — G8427 DOCREV CUR MEDS BY ELIG CLIN: HCPCS | Performed by: PHYSICIAN ASSISTANT

## 2025-04-23 PROCEDURE — 1123F ACP DISCUSS/DSCN MKR DOCD: CPT | Performed by: PHYSICIAN ASSISTANT

## 2025-04-23 PROCEDURE — 3080F DIAST BP >= 90 MM HG: CPT | Performed by: PHYSICIAN ASSISTANT

## 2025-04-23 PROCEDURE — 3075F SYST BP GE 130 - 139MM HG: CPT | Performed by: PHYSICIAN ASSISTANT

## 2025-04-23 PROCEDURE — 1160F RVW MEDS BY RX/DR IN RCRD: CPT | Performed by: PHYSICIAN ASSISTANT

## 2025-04-23 NOTE — PROGRESS NOTES
Mr. Keene is a pleasant 80-year-old  male that presents for a 2-week follow-up after treatment for right sided otitis media.  Patient reports that he feels much improved with no drainage or pain.      Physical examination with the microscope revealed the otitis media to be totally resolved to the right ear.  Examination of the left ear demonstrated no evidence of infection with the mastoid cavity noted to be clean.  Neck exam demonstrated no lymphadenopathy or thyromegaly.      Impression: Clinically resolved right otitis media    Plan: I will have him follow-up in 3 months for cerumen check.  His wife was reminded to call if they have any questions or concerns.      Electronically signed by ARIANA KNIGHT PA-C on 4/23/25 at 4:27 PM CDT

## 2025-05-29 DIAGNOSIS — E78.00 PURE HYPERCHOLESTEROLEMIA: ICD-10-CM

## 2025-05-29 RX ORDER — LOVASTATIN 20 MG/1
20 TABLET ORAL NIGHTLY
Qty: 90 TABLET | Refills: 1 | Status: SHIPPED | OUTPATIENT
Start: 2025-05-29

## 2025-05-30 DIAGNOSIS — G89.4 CHRONIC PAIN SYNDROME: ICD-10-CM

## 2025-05-30 RX ORDER — GABAPENTIN 300 MG/1
CAPSULE ORAL
Qty: 120 CAPSULE | Refills: 1 | Status: SHIPPED | OUTPATIENT
Start: 2025-05-30 | End: 2025-06-27

## 2025-06-11 RX ORDER — CLOPIDOGREL BISULFATE 75 MG/1
75 TABLET ORAL DAILY
Qty: 90 TABLET | Refills: 1 | Status: SHIPPED | OUTPATIENT
Start: 2025-06-11

## 2025-06-16 DIAGNOSIS — E55.9 VITAMIN D DEFICIENCY: ICD-10-CM

## 2025-06-16 DIAGNOSIS — E53.8 B12 DEFICIENCY: ICD-10-CM

## 2025-06-16 DIAGNOSIS — I10 HTN (HYPERTENSION), BENIGN: ICD-10-CM

## 2025-06-16 LAB
25(OH)D3 SERPL-MCNC: 42.2 NG/ML
ALBUMIN SERPL-MCNC: 4.3 G/DL (ref 3.5–5.2)
ALP SERPL-CCNC: 62 U/L (ref 40–129)
ALT SERPL-CCNC: 30 U/L (ref 10–50)
ANION GAP SERPL CALCULATED.3IONS-SCNC: 9 MMOL/L (ref 8–16)
AST SERPL-CCNC: 25 U/L (ref 10–50)
BILIRUB SERPL-MCNC: 1.2 MG/DL (ref 0.2–1.2)
BUN SERPL-MCNC: 21 MG/DL (ref 8–23)
CALCIUM SERPL-MCNC: 9.9 MG/DL (ref 8.8–10.2)
CHLORIDE SERPL-SCNC: 108 MMOL/L (ref 98–107)
CHOLEST SERPL-MCNC: 113 MG/DL (ref 0–199)
CO2 SERPL-SCNC: 26 MMOL/L (ref 22–29)
CREAT SERPL-MCNC: 1.2 MG/DL (ref 0.7–1.2)
ERYTHROCYTE [DISTWIDTH] IN BLOOD BY AUTOMATED COUNT: 13 % (ref 11.5–14.5)
FOLATE SERPL-MCNC: 11.4 NG/ML (ref 4.5–32.2)
GLUCOSE SERPL-MCNC: 91 MG/DL (ref 70–99)
HCT VFR BLD AUTO: 45.6 % (ref 42–52)
HDLC SERPL-MCNC: 42 MG/DL (ref 40–60)
HGB BLD-MCNC: 14.6 G/DL (ref 14–18)
LDLC SERPL CALC-MCNC: 57 MG/DL
MCH RBC QN AUTO: 31.3 PG (ref 27–31)
MCHC RBC AUTO-ENTMCNC: 32 G/DL (ref 33–37)
MCV RBC AUTO: 97.9 FL (ref 80–94)
PLATELET # BLD AUTO: 195 K/UL (ref 130–400)
PMV BLD AUTO: 9.6 FL (ref 9.4–12.4)
POTASSIUM SERPL-SCNC: 4.6 MMOL/L (ref 3.5–5.1)
PROT SERPL-MCNC: 6.7 G/DL (ref 6.4–8.3)
RBC # BLD AUTO: 4.66 M/UL (ref 4.7–6.1)
SODIUM SERPL-SCNC: 143 MMOL/L (ref 136–145)
TRIGL SERPL-MCNC: 71 MG/DL (ref 0–149)
TSH SERPL DL<=0.005 MIU/L-ACNC: 1.99 UIU/ML (ref 0.27–4.2)
VIT B12 SERPL-MCNC: 245 PG/ML (ref 232–1245)
WBC # BLD AUTO: 5.9 K/UL (ref 4.8–10.8)

## 2025-06-20 ENCOUNTER — OFFICE VISIT (OUTPATIENT)
Dept: INTERNAL MEDICINE | Age: 80
End: 2025-06-20
Payer: MEDICARE

## 2025-06-20 VITALS
DIASTOLIC BLOOD PRESSURE: 68 MMHG | BODY MASS INDEX: 27.83 KG/M2 | SYSTOLIC BLOOD PRESSURE: 120 MMHG | HEART RATE: 59 BPM | HEIGHT: 73 IN | OXYGEN SATURATION: 97 % | WEIGHT: 210 LBS

## 2025-06-20 DIAGNOSIS — R31.9 HEMATURIA, UNSPECIFIED TYPE: ICD-10-CM

## 2025-06-20 DIAGNOSIS — G89.29 CHRONIC MIDLINE LOW BACK PAIN WITHOUT SCIATICA: ICD-10-CM

## 2025-06-20 DIAGNOSIS — Z86.73 HISTORY OF STROKE: ICD-10-CM

## 2025-06-20 DIAGNOSIS — E53.8 LOW SERUM VITAMIN B12: ICD-10-CM

## 2025-06-20 DIAGNOSIS — M54.50 CHRONIC MIDLINE LOW BACK PAIN WITHOUT SCIATICA: ICD-10-CM

## 2025-06-20 DIAGNOSIS — J44.9 CHRONIC OBSTRUCTIVE PULMONARY DISEASE, UNSPECIFIED COPD TYPE (HCC): ICD-10-CM

## 2025-06-20 DIAGNOSIS — Z00.00 MEDICARE ANNUAL WELLNESS VISIT, SUBSEQUENT: Primary | ICD-10-CM

## 2025-06-20 DIAGNOSIS — F01.B0 MODERATE VASCULAR DEMENTIA WITHOUT BEHAVIORAL DISTURBANCE, PSYCHOTIC DISTURBANCE, MOOD DISTURBANCE, OR ANXIETY (HCC): ICD-10-CM

## 2025-06-20 PROBLEM — I71.40 ABDOMINAL AORTIC ANEURYSM (AAA) WITHOUT RUPTURE: Status: RESOLVED | Noted: 2020-01-06 | Resolved: 2025-06-20

## 2025-06-20 PROBLEM — K21.9 GASTROESOPHAGEAL REFLUX DISEASE WITHOUT ESOPHAGITIS: Status: RESOLVED | Noted: 2021-07-01 | Resolved: 2025-06-20

## 2025-06-20 PROBLEM — G62.9 NEUROPATHY: Status: RESOLVED | Noted: 2018-06-06 | Resolved: 2025-06-20

## 2025-06-20 LAB
APPEARANCE FLUID: CLEAR
BACTERIA URNS QL MICRO: NEGATIVE /HPF
BILIRUB UR QL STRIP: NEGATIVE
BILIRUBIN, POC: ABNORMAL
BLOOD URINE, POC: ABNORMAL
CLARITY UR: CLEAR
CLARITY, POC: CLEAR
COLOR UR: YELLOW
COLOR, POC: ABNORMAL
CRYSTALS URNS MICRO: ABNORMAL /HPF
EPI CELLS #/AREA URNS AUTO: 0 /HPF (ref 0–5)
GLUCOSE UR STRIP.AUTO-MCNC: NEGATIVE MG/DL
GLUCOSE URINE, POC: ABNORMAL MG/DL
HGB UR STRIP.AUTO-MCNC: ABNORMAL MG/L
HYALINE CASTS #/AREA URNS AUTO: 0 /HPF (ref 0–8)
KETONES UR STRIP.AUTO-MCNC: NEGATIVE MG/DL
KETONES, POC: ABNORMAL MG/DL
LEUKOCYTE EST, POC: ABNORMAL
LEUKOCYTE ESTERASE UR QL STRIP.AUTO: NEGATIVE
NITRITE UR QL STRIP.AUTO: NEGATIVE
NITRITE, POC: ABNORMAL
PH UR STRIP.AUTO: 6 [PH] (ref 5–8)
PH, POC: 6
PROT UR STRIP.AUTO-MCNC: ABNORMAL MG/DL
PROTEIN, POC: ABNORMAL MG/DL
RBC #/AREA URNS AUTO: 3 /HPF (ref 0–4)
SP GR UR STRIP.AUTO: 1.02 (ref 1–1.03)
SPECIFIC GRAVITY, POC: 1.03
UROBILINOGEN UR STRIP.AUTO-MCNC: 1 E.U./DL
UROBILINOGEN, POC: 0.2 MG/DL
WBC #/AREA URNS AUTO: 2 /HPF (ref 0–5)

## 2025-06-20 PROCEDURE — 3023F SPIROM DOC REV: CPT | Performed by: INTERNAL MEDICINE

## 2025-06-20 PROCEDURE — G0439 PPPS, SUBSEQ VISIT: HCPCS | Performed by: INTERNAL MEDICINE

## 2025-06-20 PROCEDURE — 3078F DIAST BP <80 MM HG: CPT | Performed by: INTERNAL MEDICINE

## 2025-06-20 PROCEDURE — G8427 DOCREV CUR MEDS BY ELIG CLIN: HCPCS | Performed by: INTERNAL MEDICINE

## 2025-06-20 PROCEDURE — G8417 CALC BMI ABV UP PARAM F/U: HCPCS | Performed by: INTERNAL MEDICINE

## 2025-06-20 PROCEDURE — 99213 OFFICE O/P EST LOW 20 MIN: CPT | Performed by: INTERNAL MEDICINE

## 2025-06-20 PROCEDURE — 81002 URINALYSIS NONAUTO W/O SCOPE: CPT | Performed by: INTERNAL MEDICINE

## 2025-06-20 PROCEDURE — 4004F PT TOBACCO SCREEN RCVD TLK: CPT | Performed by: INTERNAL MEDICINE

## 2025-06-20 PROCEDURE — 1123F ACP DISCUSS/DSCN MKR DOCD: CPT | Performed by: INTERNAL MEDICINE

## 2025-06-20 PROCEDURE — 3074F SYST BP LT 130 MM HG: CPT | Performed by: INTERNAL MEDICINE

## 2025-06-20 PROCEDURE — 96372 THER/PROPH/DIAG INJ SC/IM: CPT | Performed by: INTERNAL MEDICINE

## 2025-06-20 RX ORDER — LANOLIN ALCOHOL/MO/W.PET/CERES
1000 CREAM (GRAM) TOPICAL DAILY
Qty: 90 TABLET | Refills: 3 | Status: SHIPPED | OUTPATIENT
Start: 2025-06-20

## 2025-06-20 RX ORDER — FLUTICASONE FUROATE, UMECLIDINIUM BROMIDE AND VILANTEROL TRIFENATATE 200; 62.5; 25 UG/1; UG/1; UG/1
1 POWDER RESPIRATORY (INHALATION) DAILY
Qty: 1 EACH | Refills: 5
Start: 2025-06-20

## 2025-06-20 RX ORDER — CYANOCOBALAMIN 1000 UG/ML
1000 INJECTION, SOLUTION INTRAMUSCULAR; SUBCUTANEOUS ONCE
Status: COMPLETED | OUTPATIENT
Start: 2025-06-20 | End: 2025-06-20

## 2025-06-20 RX ADMIN — CYANOCOBALAMIN 1000 MCG: 1000 INJECTION, SOLUTION INTRAMUSCULAR; SUBCUTANEOUS at 10:48

## 2025-06-20 SDOH — ECONOMIC STABILITY: TRANSPORTATION INSECURITY
IN THE PAST 12 MONTHS, HAS LACK OF TRANSPORTATION KEPT YOU FROM MEETINGS, WORK, OR FROM GETTING THINGS NEEDED FOR DAILY LIVING?: NO

## 2025-06-20 SDOH — ECONOMIC STABILITY: FOOD INSECURITY: WITHIN THE PAST 12 MONTHS, THE FOOD YOU BOUGHT JUST DIDN'T LAST AND YOU DIDN'T HAVE MONEY TO GET MORE.: NEVER TRUE

## 2025-06-20 SDOH — ECONOMIC STABILITY: FOOD INSECURITY: WITHIN THE PAST 12 MONTHS, YOU WORRIED THAT YOUR FOOD WOULD RUN OUT BEFORE YOU GOT MONEY TO BUY MORE.: NEVER TRUE

## 2025-06-20 SDOH — ECONOMIC STABILITY: TRANSPORTATION INSECURITY
IN THE PAST 12 MONTHS, HAS THE LACK OF TRANSPORTATION KEPT YOU FROM MEDICAL APPOINTMENTS OR FROM GETTING MEDICATIONS?: NO

## 2025-06-20 SDOH — HEALTH STABILITY: PHYSICAL HEALTH: ON AVERAGE, HOW MANY DAYS PER WEEK DO YOU ENGAGE IN MODERATE TO STRENUOUS EXERCISE (LIKE A BRISK WALK)?: 0 DAYS

## 2025-06-20 SDOH — ECONOMIC STABILITY: INCOME INSECURITY: IN THE LAST 12 MONTHS, WAS THERE A TIME WHEN YOU WERE NOT ABLE TO PAY THE MORTGAGE OR RENT ON TIME?: NO

## 2025-06-20 ASSESSMENT — PATIENT HEALTH QUESTIONNAIRE - PHQ9
1. LITTLE INTEREST OR PLEASURE IN DOING THINGS: NEARLY EVERY DAY
3. TROUBLE FALLING OR STAYING ASLEEP: NOT AT ALL
SUM OF ALL RESPONSES TO PHQ QUESTIONS 1-9: 7
SUM OF ALL RESPONSES TO PHQ QUESTIONS 1-9: 7
9. THOUGHTS THAT YOU WOULD BE BETTER OFF DEAD, OR OF HURTING YOURSELF: NOT AT ALL
2. FEELING DOWN, DEPRESSED OR HOPELESS: NOT AT ALL
8. MOVING OR SPEAKING SO SLOWLY THAT OTHER PEOPLE COULD HAVE NOTICED. OR THE OPPOSITE, BEING SO FIGETY OR RESTLESS THAT YOU HAVE BEEN MOVING AROUND A LOT MORE THAN USUAL: NOT AT ALL
4. FEELING TIRED OR HAVING LITTLE ENERGY: SEVERAL DAYS
SUM OF ALL RESPONSES TO PHQ QUESTIONS 1-9: 7
SUM OF ALL RESPONSES TO PHQ QUESTIONS 1-9: 7
10. IF YOU CHECKED OFF ANY PROBLEMS, HOW DIFFICULT HAVE THESE PROBLEMS MADE IT FOR YOU TO DO YOUR WORK, TAKE CARE OF THINGS AT HOME, OR GET ALONG WITH OTHER PEOPLE: SOMEWHAT DIFFICULT
7. TROUBLE CONCENTRATING ON THINGS, SUCH AS READING THE NEWSPAPER OR WATCHING TELEVISION: NEARLY EVERY DAY
5. POOR APPETITE OR OVEREATING: NOT AT ALL
6. FEELING BAD ABOUT YOURSELF - OR THAT YOU ARE A FAILURE OR HAVE LET YOURSELF OR YOUR FAMILY DOWN: NOT AT ALL

## 2025-06-20 ASSESSMENT — LIFESTYLE VARIABLES
HOW OFTEN DO YOU HAVE SIX OR MORE DRINKS ON ONE OCCASION: 1
HOW OFTEN DO YOU HAVE A DRINK CONTAINING ALCOHOL: NEVER
HOW MANY STANDARD DRINKS CONTAINING ALCOHOL DO YOU HAVE ON A TYPICAL DAY: 0
HOW OFTEN DO YOU HAVE A DRINK CONTAINING ALCOHOL: 1
HOW MANY STANDARD DRINKS CONTAINING ALCOHOL DO YOU HAVE ON A TYPICAL DAY: PATIENT DOES NOT DRINK

## 2025-06-20 NOTE — PROGRESS NOTES
MD   magnesium 30 MG tablet Take 1 tablet by mouth daily  ProviderBlas MD   aspirin 81 MG EC tablet Take 1 tablet by mouth daily  ProviderBlas MD       [facility]Including outside providers/suppliers regularly involved in providing care):   Patient Care Team:  Obdulia Bello MD as PCP - General (Internal Medicine)  Obdulia Bello MD as PCP - Empaneled Provider  Valery Calixto APRN - CNP as Nurse Practitioner (Nurse Practitioner, Family)  Josse Ortiz MD as Consulting Physician (Cardiology)     Recommendations for Preventive Services Due: see orders and patient instructions/AVS.  Recommended screening schedule for the next 5-10 years is provided to the patient in written form: see Patient Instructions/AVS.     Reviewed and updated this visit:  Allergies  Meds  Problems  Sexual Hx

## 2025-06-22 LAB — BACTERIA UR CULT: NORMAL

## 2025-07-02 PROBLEM — Z86.73 HISTORY OF STROKE: Status: ACTIVE | Noted: 2025-07-02

## 2025-07-03 NOTE — PATIENT INSTRUCTIONS
peaches, and berries.     Foods high in fiber can reduce your cholesterol and provide important vitamins and minerals. High-fiber foods include whole-grain cereals and breads, oatmeal, beans, brown rice, citrus fruits, and apples.     Eat lean proteins. Heart-healthy proteins include seafood, lean meats and poultry, eggs, beans, peas, nuts, seeds, and soy products.     Limit drinks and foods with added sugar. These include candy, desserts, and soda pop.   Heart-healthy lifestyle    If your doctor recommends it, get more exercise. For many people, walking is a good choice. Or you may want to swim, bike, or do other activities. Bit by bit, increase the time you're active every day. Try for at least 30 minutes on most days of the week.     Try to quit or cut back on using tobacco and other nicotine products. This includes smoking and vaping. If you need help quitting, talk to your doctor about stop-smoking programs and medicines. These can increase your chances of quitting for good. Quitting is one of the most important things you can do to protect your heart. It is never too late to quit. Try to avoid secondhand smoke too.     Stay at a weight that's healthy for you. Talk to your doctor if you need help losing weight.     Try to get 7 to 9 hours of sleep each night.     Limit alcohol to 2 drinks a day for men and 1 drink a day for women. Too much alcohol can cause health problems.     Manage other health problems such as diabetes, high blood pressure, and high cholesterol. If you think you may have a problem with alcohol or drug use, talk to your doctor.   Medicines    Take your medicines exactly as prescribed. Call your doctor if you think you are having a problem with your medicine.     If your doctor recommends aspirin, take the amount directed each day. Make sure you take aspirin and not another kind of pain reliever, such as acetaminophen (Tylenol).   When should you call for help?   Call 911 if you have symptoms

## 2025-07-08 ENCOUNTER — HOSPITAL ENCOUNTER (OUTPATIENT)
Dept: CT IMAGING | Age: 80
Discharge: HOME | End: 2025-07-08
Payer: MEDICARE

## 2025-07-08 DIAGNOSIS — M54.50 CHRONIC MIDLINE LOW BACK PAIN WITHOUT SCIATICA: ICD-10-CM

## 2025-07-08 DIAGNOSIS — G89.29 CHRONIC MIDLINE LOW BACK PAIN WITHOUT SCIATICA: ICD-10-CM

## 2025-07-08 PROCEDURE — 6360000004 HC RX CONTRAST MEDICATION: Performed by: INTERNAL MEDICINE

## 2025-07-08 PROCEDURE — 74177 CT ABD & PELVIS W/CONTRAST: CPT

## 2025-07-08 RX ORDER — IOPAMIDOL 755 MG/ML
75 INJECTION, SOLUTION INTRAVASCULAR
Status: COMPLETED | OUTPATIENT
Start: 2025-07-08 | End: 2025-07-08

## 2025-07-08 RX ADMIN — IOPAMIDOL 75 ML: 755 INJECTION, SOLUTION INTRAVENOUS at 14:15

## 2025-07-14 RX ORDER — METOPROLOL SUCCINATE 25 MG/1
25 TABLET, EXTENDED RELEASE ORAL DAILY
Qty: 90 TABLET | Refills: 0 | Status: SHIPPED | OUTPATIENT
Start: 2025-07-14 | End: 2025-11-11

## 2025-08-12 ENCOUNTER — PATIENT MESSAGE (OUTPATIENT)
Dept: NEUROLOGY | Age: 80
End: 2025-08-12

## 2025-08-13 ENCOUNTER — OFFICE VISIT (OUTPATIENT)
Dept: ENT CLINIC | Age: 80
End: 2025-08-13
Payer: MEDICARE

## 2025-08-13 VITALS
BODY MASS INDEX: 27.96 KG/M2 | SYSTOLIC BLOOD PRESSURE: 116 MMHG | HEIGHT: 73 IN | WEIGHT: 211 LBS | DIASTOLIC BLOOD PRESSURE: 68 MMHG

## 2025-08-13 DIAGNOSIS — H61.21 IMPACTED CERUMEN OF RIGHT EAR: Primary | ICD-10-CM

## 2025-08-13 PROCEDURE — 69210 REMOVE IMPACTED EAR WAX UNI: CPT | Performed by: PHYSICIAN ASSISTANT

## (undated) DEVICE — THE SINGLE USE ETRAP – POLYP TRAP IS USED FOR SUCTION RETRIEVAL OF ENDOSCOPICALLY REMOVED POLYPS.: Brand: ETRAP

## (undated) DEVICE — ENDOGATOR AUXILIARY WATER JET CONNECTOR: Brand: ENDOGATOR

## (undated) DEVICE — THE CHANNEL CLEANING BRUSH IS A NYLON FLEXI BRUSH ATTACHED TO A FLEXIBLE PLASTIC SHEATH DESIGNED TO SAFELY REMOVE DEBRIS FROM FLEXIBLE ENDOSCOPES.

## (undated) DEVICE — CUFF,BP,DISP,1 TUBE,ADULT,HP: Brand: MEDLINE

## (undated) DEVICE — SENSR O2 OXIMAX FNGR A/ 18IN NONSTR

## (undated) DEVICE — Device: Brand: DEFENDO AIR/WATER/SUCTION AND BIOPSY VALVE

## (undated) DEVICE — MASK,OXYGEN,MED CONC,ADLT,7' TUB, UC: Brand: PENDING

## (undated) DEVICE — YANKAUER,BULB TIP WITH VENT: Brand: ARGYLE

## (undated) DEVICE — TBG SMPL FLTR LINE NASL 02/C02 A/ BX/100

## (undated) DEVICE — SINGLE-USE POLYPECTOMY SNARE: Brand: CAPTIVATOR II